# Patient Record
Sex: MALE | Race: WHITE | Employment: OTHER | URBAN - METROPOLITAN AREA
[De-identification: names, ages, dates, MRNs, and addresses within clinical notes are randomized per-mention and may not be internally consistent; named-entity substitution may affect disease eponyms.]

---

## 2021-06-15 ENCOUNTER — HOSPITAL ENCOUNTER (EMERGENCY)
Age: 51
Discharge: HOME | End: 2021-06-16
Attending: EMERGENCY MEDICINE
Payer: MEDICARE

## 2021-06-15 ENCOUNTER — APPOINTMENT (OUTPATIENT)
Dept: CT IMAGING | Age: 51
End: 2021-06-15
Payer: MEDICARE

## 2021-06-15 ENCOUNTER — APPOINTMENT (OUTPATIENT)
Dept: GENERAL RADIOLOGY | Age: 51
End: 2021-06-15
Payer: MEDICARE

## 2021-06-15 DIAGNOSIS — R07.9 CHEST PAIN, UNSPECIFIED TYPE: Primary | ICD-10-CM

## 2021-06-15 LAB
ANION GAP SERPL CALCULATED.3IONS-SCNC: 10 MMOL/L (ref 3–16)
BASOPHILS ABSOLUTE: 0.1 K/UL (ref 0–0.2)
BASOPHILS RELATIVE PERCENT: 1.1 %
BUN BLDV-MCNC: 9 MG/DL (ref 7–20)
CALCIUM SERPL-MCNC: 9 MG/DL (ref 8.3–10.6)
CHLORIDE BLD-SCNC: 102 MMOL/L (ref 99–110)
CO2: 26 MMOL/L (ref 21–32)
CREAT SERPL-MCNC: 0.9 MG/DL (ref 0.9–1.3)
EKG ATRIAL RATE: 80 BPM
EKG DIAGNOSIS: NORMAL
EKG P AXIS: 36 DEGREES
EKG P-R INTERVAL: 140 MS
EKG Q-T INTERVAL: 386 MS
EKG QRS DURATION: 74 MS
EKG QTC CALCULATION (BAZETT): 445 MS
EKG R AXIS: 0 DEGREES
EKG T AXIS: 38 DEGREES
EKG VENTRICULAR RATE: 80 BPM
EOSINOPHILS ABSOLUTE: 0.1 K/UL (ref 0–0.6)
EOSINOPHILS RELATIVE PERCENT: 1.9 %
GFR AFRICAN AMERICAN: >60
GFR NON-AFRICAN AMERICAN: >60
GLUCOSE BLD-MCNC: 104 MG/DL (ref 70–99)
HCT VFR BLD CALC: 40.8 % (ref 40.5–52.5)
HEMOGLOBIN: 14.2 G/DL (ref 13.5–17.5)
LYMPHOCYTES ABSOLUTE: 1.6 K/UL (ref 1–5.1)
LYMPHOCYTES RELATIVE PERCENT: 24.6 %
MCH RBC QN AUTO: 31.1 PG (ref 26–34)
MCHC RBC AUTO-ENTMCNC: 34.8 G/DL (ref 31–36)
MCV RBC AUTO: 89.4 FL (ref 80–100)
MONOCYTES ABSOLUTE: 0.4 K/UL (ref 0–1.3)
MONOCYTES RELATIVE PERCENT: 6.5 %
NEUTROPHILS ABSOLUTE: 4.4 K/UL (ref 1.7–7.7)
NEUTROPHILS RELATIVE PERCENT: 65.9 %
PDW BLD-RTO: 13.5 % (ref 12.4–15.4)
PLATELET # BLD: 195 K/UL (ref 135–450)
PMV BLD AUTO: 7.7 FL (ref 5–10.5)
POTASSIUM REFLEX MAGNESIUM: 3.6 MMOL/L (ref 3.5–5.1)
RBC # BLD: 4.56 M/UL (ref 4.2–5.9)
SODIUM BLD-SCNC: 138 MMOL/L (ref 136–145)
TROPONIN: <0.01 NG/ML
TROPONIN: <0.01 NG/ML
WBC # BLD: 6.6 K/UL (ref 4–11)

## 2021-06-15 PROCEDURE — 96376 TX/PRO/DX INJ SAME DRUG ADON: CPT

## 2021-06-15 PROCEDURE — 71046 X-RAY EXAM CHEST 2 VIEWS: CPT

## 2021-06-15 PROCEDURE — 6360000004 HC RX CONTRAST MEDICATION: Performed by: EMERGENCY MEDICINE

## 2021-06-15 PROCEDURE — 36415 COLL VENOUS BLD VENIPUNCTURE: CPT

## 2021-06-15 PROCEDURE — 93005 ELECTROCARDIOGRAM TRACING: CPT | Performed by: EMERGENCY MEDICINE

## 2021-06-15 PROCEDURE — 84484 ASSAY OF TROPONIN QUANT: CPT

## 2021-06-15 PROCEDURE — 85025 COMPLETE CBC W/AUTO DIFF WBC: CPT

## 2021-06-15 PROCEDURE — 96375 TX/PRO/DX INJ NEW DRUG ADDON: CPT

## 2021-06-15 PROCEDURE — 71260 CT THORAX DX C+: CPT

## 2021-06-15 PROCEDURE — 6360000002 HC RX W HCPCS: Performed by: EMERGENCY MEDICINE

## 2021-06-15 PROCEDURE — 96374 THER/PROPH/DIAG INJ IV PUSH: CPT

## 2021-06-15 PROCEDURE — 6370000000 HC RX 637 (ALT 250 FOR IP): Performed by: EMERGENCY MEDICINE

## 2021-06-15 PROCEDURE — 99285 EMERGENCY DEPT VISIT HI MDM: CPT

## 2021-06-15 PROCEDURE — 80048 BASIC METABOLIC PNL TOTAL CA: CPT

## 2021-06-15 RX ORDER — ATORVASTATIN CALCIUM 20 MG/1
20 TABLET, FILM COATED ORAL DAILY
Status: DISCONTINUED | OUTPATIENT
Start: 2021-06-16 | End: 2021-06-16 | Stop reason: HOSPADM

## 2021-06-15 RX ORDER — DULOXETIN HYDROCHLORIDE 30 MG/1
60 CAPSULE, DELAYED RELEASE ORAL DAILY
Status: DISCONTINUED | OUTPATIENT
Start: 2021-06-16 | End: 2021-06-16 | Stop reason: HOSPADM

## 2021-06-15 RX ORDER — LISINOPRIL 20 MG/1
30 TABLET ORAL DAILY
COMMUNITY
End: 2021-09-08 | Stop reason: SDUPTHER

## 2021-06-15 RX ORDER — ASPIRIN 81 MG/1
81 TABLET, CHEWABLE ORAL DAILY
Status: DISCONTINUED | OUTPATIENT
Start: 2021-06-15 | End: 2021-06-16 | Stop reason: HOSPADM

## 2021-06-15 RX ORDER — ATORVASTATIN CALCIUM 20 MG/1
20 TABLET, FILM COATED ORAL DAILY
Status: ON HOLD | COMMUNITY
End: 2021-09-01 | Stop reason: HOSPADM

## 2021-06-15 RX ORDER — ACETAMINOPHEN 325 MG/1
650 TABLET ORAL EVERY 6 HOURS PRN
Status: DISCONTINUED | OUTPATIENT
Start: 2021-06-15 | End: 2021-06-16 | Stop reason: HOSPADM

## 2021-06-15 RX ORDER — GABAPENTIN 400 MG/1
400 CAPSULE ORAL 3 TIMES DAILY
Status: DISCONTINUED | OUTPATIENT
Start: 2021-06-15 | End: 2021-06-16 | Stop reason: HOSPADM

## 2021-06-15 RX ORDER — PANTOPRAZOLE SODIUM 40 MG/1
40 TABLET, DELAYED RELEASE ORAL
Status: DISCONTINUED | OUTPATIENT
Start: 2021-06-16 | End: 2021-06-16 | Stop reason: HOSPADM

## 2021-06-15 RX ORDER — OMEPRAZOLE 20 MG/1
40 CAPSULE, DELAYED RELEASE ORAL DAILY
COMMUNITY

## 2021-06-15 RX ORDER — GABAPENTIN 400 MG/1
400 CAPSULE ORAL 3 TIMES DAILY
COMMUNITY

## 2021-06-15 RX ORDER — ONDANSETRON 2 MG/ML
4 INJECTION INTRAMUSCULAR; INTRAVENOUS EVERY 30 MIN PRN
Status: DISCONTINUED | OUTPATIENT
Start: 2021-06-15 | End: 2021-06-16 | Stop reason: HOSPADM

## 2021-06-15 RX ORDER — MORPHINE SULFATE 4 MG/ML
4 INJECTION, SOLUTION INTRAMUSCULAR; INTRAVENOUS ONCE
Status: COMPLETED | OUTPATIENT
Start: 2021-06-15 | End: 2021-06-15

## 2021-06-15 RX ORDER — ASPIRIN 81 MG/1
81 TABLET, CHEWABLE ORAL DAILY
Status: ON HOLD | COMMUNITY
End: 2022-04-06 | Stop reason: SDUPTHER

## 2021-06-15 RX ORDER — DULOXETIN HYDROCHLORIDE 60 MG/1
60 CAPSULE, DELAYED RELEASE ORAL NIGHTLY
COMMUNITY

## 2021-06-15 RX ADMIN — ASPIRIN 81 MG: 81 TABLET, CHEWABLE ORAL at 21:56

## 2021-06-15 RX ADMIN — MORPHINE SULFATE 4 MG: 4 INJECTION INTRAVENOUS at 17:54

## 2021-06-15 RX ADMIN — GABAPENTIN 400 MG: 400 CAPSULE ORAL at 21:56

## 2021-06-15 RX ADMIN — IOPAMIDOL 80 ML: 755 INJECTION, SOLUTION INTRAVENOUS at 17:13

## 2021-06-15 ASSESSMENT — ENCOUNTER SYMPTOMS
NAUSEA: 0
DIARRHEA: 0
ABDOMINAL PAIN: 0
SHORTNESS OF BREATH: 0
COUGH: 0
VOMITING: 0

## 2021-06-15 ASSESSMENT — PAIN DESCRIPTION - LOCATION: LOCATION: CHEST

## 2021-06-15 ASSESSMENT — PAIN SCALES - GENERAL
PAINLEVEL_OUTOF10: 5
PAINLEVEL_OUTOF10: 4
PAINLEVEL_OUTOF10: 5

## 2021-06-15 ASSESSMENT — HEART SCORE: ECG: 1

## 2021-06-15 NOTE — ED PROVIDER NOTES
[address] [facility]          ATTENDING PHYSICIAN NOTE       Date of evaluation: 6/15/2021    Chief Complaint     Chest Pain (324 asa/nitro given per squad)      History of Present Illness     Nia Torres is a 49 y.o. male with a history of prior stroke who presents with complaints of sudden onset left sided sharp chest pain radiating through to his left shoulder and back as well as left arm associated with no nausea or vomiting or diaphoresis. The patient has experienced similar symptoms in the past, from what he recalls in 2015 before his last angiogram he had similar symptoms. He denies any productive cough fever or chills. Review of Systems     Review of Systems   Constitutional: Negative for chills, diaphoresis and fever. Respiratory: Negative for cough and shortness of breath. Cardiovascular: Positive for chest pain. Gastrointestinal: Negative for abdominal pain, diarrhea, nausea and vomiting. All other systems reviewed and are negative. Past Medical, Surgical, Family, and Social History     He has a past medical history of Hyperlipidemia, Hypertension, and Stroke ([address] He has no past surgical history on file. His family history is not on file. He reports that he has never smoked. He has never used smokeless tobacco. He reports that he does not drink alcohol and does not use drugs. Medications     Previous Medications    ASPIRIN 81 MG CHEWABLE TABLET    Take 81 mg by mouth daily    ATORVASTATIN (LIPITOR) 20 MG TABLET    Take 20 mg by mouth daily    DULOXETINE (CYMBALTA) 60 MG EXTENDED RELEASE CAPSULE    Take 60 mg by mouth daily    GABAPENTIN (NEURONTIN) 400 MG CAPSULE    Take 400 mg by mouth 3 times daily.     LISINOPRIL (PRINIVIL;ZESTRIL) 20 MG TABLET    Take 30 mg by mouth daily    OMEPRAZOLE (PRILOSEC) 20 MG DELAYED RELEASE CAPSULE    Take 40 mg by mouth daily    VERAPAMIL (CALAN SR) 120 MG EXTENDED RELEASE TABLET    Take 120 mg by mouth nightly orders placed or performed during the hospital encounter of 06/15/21   CBC Auto Differential   Result Value Ref Range    WBC 6.6 4.0 - 11.0 K/uL    RBC 4.56 4.20 - 5.90 M/uL    Hemoglobin 14.2 13.5 - 17.5 g/dL    Hematocrit 40.8 40.5 - 52.5 %    MCV 89.4 80.0 - 100.0 fL    MCH 31.1 26.0 - 34.0 pg    MCHC 34.8 31.0 - 36.0 g/dL    RDW 13.5 12.4 - 15.4 %    Platelets 956 902 - 431 K/uL    MPV 7.7 5.0 - 10.5 fL    Neutrophils % 65.9 %    Lymphocytes % 24.6 %    Monocytes % 6.5 %    Eosinophils % 1.9 %    Basophils % 1.1 %    Neutrophils Absolute 4.4 1.7 - 7.7 K/uL    Lymphocytes Absolute 1.6 1.0 - 5.1 K/uL    Monocytes Absolute 0.4 0.0 - 1.3 K/uL    Eosinophils Absolute 0.1 0.0 - 0.6 K/uL    Basophils Absolute 0.1 0.0 - 0.2 K/uL   Basic Metabolic Panel w/ Reflex to MG   Result Value Ref Range    Sodium 138 136 - 145 mmol/L    Potassium reflex Magnesium 3.6 3.5 - 5.1 mmol/L    Chloride 102 99 - 110 mmol/L    CO2 26 21 - 32 mmol/L    Anion Gap 10 3 - 16    Glucose 104 (H) 70 - 99 mg/dL    BUN 9 7 - 20 mg/dL    CREATININE 0.9 0.9 - 1.3 mg/dL    GFR Non-African American >60 >60    GFR African American >60 >60    Calcium 9.0 8.3 - 10.6 mg/dL   Troponin   Result Value Ref Range    Troponin <0.01 <0.01 ng/mL   EKG 12 Lead   Result Value Ref Range    Ventricular Rate 80 BPM    Atrial Rate 80 BPM    P-R Interval 140 ms    QRS Duration 74 ms    Q-T Interval 386 ms    QTc Calculation (Bazett) 445 ms    P Axis 36 degrees    R Axis 0 degrees    T Axis 38 degrees    Diagnosis       EKG performed in ER and to be interpreted by ER physician. Confirmed by MD, ER ([id number]),  Tracy Mcgill ([phone]) on 6/15/2021 4:02:39 PM       RECENT VITALS:  BP: (!) 137/93,Temp: 97.8 °F (36.6 °C), Pulse: 85, Resp: 16, SpO2: 94 %       ED Course     Nursing Notes, Past Medical Hx, Past Surgical Hx, Social Hx,Allergies, and Family Hx were reviewed.     patient was given the following medications:  Orders Placed This Encounter   Medications    iopamidol (ISOVUE-370) 76 % injection 80 mL    morphine injection 4 mg       CONSULTS:  None    MEDICAL DECISIONMAKING / ASSESSMENT / Ricardo Dory is a 49 y.o. male presenting with left-sided chest pain that is sharp going through to the shoulder and into the left arm. The patient has a history of stroke with residual left-sided weakness and does have a history of hypertension and hypercholesterolemia. Given these risk factors, saw that he would need further risk stratification. Initial troponin is negative and EKG is not showing any signs of acute ischemia. CT of the chest was performed to look for aortic dissection given the location of the pain and this was unremarkable. I felt the patient would need provocative testing and he will be placed in ED observation to have a nuclear stress test in the morning. Clinical Impression     1.  Chest pain, unspecified type        Disposition     DISPOSITION         Jah Conway MD  06/15/21 0011

## 2021-06-15 NOTE — ED NOTES
Bed: A10-10  Expected date:   Expected time:   Means of arrival:   Comments:  Hold for room Darshan 106, RN  06/15/21 [place]

## 2021-06-15 NOTE — ED NOTES
[facility] Emergency Department  EDObservation Admission Note   Emergency Physicians       Time Placed in ED Observation: DISPOSITION Ed Observation 06/15/2021 05:57:35 PM    Impression and Plan      In summary, Chuck Crandall is admitted by to the [facility] for chest pain. Dr. Zunilda Guzman is the CDU admission attending. This patient has been risk-stratified based on the available history, physical exam, and related study findings. Admission toobservation status for further diagnosis/treatment/monitoring of chest pain is warranted clinically. This extendedperiod of observation is specifically required to determine the need for hospitalization. We will observe the patient for the following endpoints:    - serial troponins  - chemical nuclear stress test    When met, appropriate disposition will be arranged. Diagnostic Evaluation      Diagnostic studies and ED interventions germane to this period of clinical observation willinclude:    - EKG, chest xray, trop x2 negative       Consultant(s)      None       Patient History      Chuck Crandall is a 49 y.o. male who presented to the Emergency Department for evaluation of chest pain. The acute evaluation included EKG, chest x-ray, and 2 troponins that were negative. Also had a CT of the chest to look for aortic abnormality and none was seen under    Upon admission to the Clinical Decision Unit, Chuck Crandall had received morphine for his chest pain was chest pain-free. The pain was atypical and that it was sharp pain radiating to his left shoulder with some pain in the left arm but nonexertional.  Heart score is 3. Past Medical History  Past Medical History:   Diagnosis Date    Hyperlipidemia     Hypertension     Stroke ([address]     2012     No past surgical history on file. No family history on file.   Social History     Tobacco Use    Smoking status: Never Smoker    Smokeless tobacco: Never Used   Substance

## 2021-06-16 VITALS
OXYGEN SATURATION: 100 % | HEART RATE: 81 BPM | DIASTOLIC BLOOD PRESSURE: 100 MMHG | HEIGHT: 68 IN | TEMPERATURE: 97.9 F | RESPIRATION RATE: 15 BRPM | BODY MASS INDEX: 30.31 KG/M2 | SYSTOLIC BLOOD PRESSURE: 149 MMHG | WEIGHT: 200 LBS

## 2021-06-16 LAB
EKG ATRIAL RATE: 75 BPM
EKG DIAGNOSIS: NORMAL
EKG P AXIS: 64 DEGREES
EKG P-R INTERVAL: 152 MS
EKG Q-T INTERVAL: 394 MS
EKG QRS DURATION: 88 MS
EKG QTC CALCULATION (BAZETT): 439 MS
EKG R AXIS: 41 DEGREES
EKG T AXIS: 57 DEGREES
EKG VENTRICULAR RATE: 75 BPM
LV EF: 72 %
LVEF MODALITY: NORMAL
TROPONIN: <0.01 NG/ML

## 2021-06-16 PROCEDURE — A9502 TC99M TETROFOSMIN: HCPCS | Performed by: EMERGENCY MEDICINE

## 2021-06-16 PROCEDURE — 2580000003 HC RX 258: Performed by: EMERGENCY MEDICINE

## 2021-06-16 PROCEDURE — 3430000000 HC RX DIAGNOSTIC RADIOPHARMACEUTICAL: Performed by: EMERGENCY MEDICINE

## 2021-06-16 PROCEDURE — 78452 HT MUSCLE IMAGE SPECT MULT: CPT

## 2021-06-16 PROCEDURE — 6360000002 HC RX W HCPCS: Performed by: EMERGENCY MEDICINE

## 2021-06-16 PROCEDURE — 93017 CV STRESS TEST TRACING ONLY: CPT

## 2021-06-16 RX ORDER — SODIUM CHLORIDE 0.9 % (FLUSH) 0.9 %
10 SYRINGE (ML) INJECTION PRN
Status: DISCONTINUED | OUTPATIENT
Start: 2021-06-16 | End: 2021-06-16 | Stop reason: HOSPADM

## 2021-06-16 RX ADMIN — TETROFOSMIN 30 MILLICURIE: 1.38 INJECTION, POWDER, LYOPHILIZED, FOR SOLUTION INTRAVENOUS at 12:42

## 2021-06-16 RX ADMIN — REGADENOSON 0.4 MG: 0.08 INJECTION, SOLUTION INTRAVENOUS at 12:42

## 2021-06-16 RX ADMIN — Medication 10 ML: at 11:28

## 2021-06-16 RX ADMIN — Medication 10 ML: at 12:42

## 2021-06-16 RX ADMIN — TETROFOSMIN 10 MILLICURIE: 1.38 INJECTION, POWDER, LYOPHILIZED, FOR SOLUTION INTRAVENOUS at 11:28

## 2021-06-16 NOTE — ED PROVIDER NOTES
Nose: Nose normal.      Mouth/Throat:      Mouth: Mucous membranes are moist.   Eyes:      Extraocular Movements: Extraocular movements intact. Conjunctiva/sclera: Conjunctivae normal.      Pupils: Pupils are equal, round, and reactive to light. Cardiovascular:      Rate and Rhythm: Normal rate and regular rhythm. Pulses: Normal pulses. Pulmonary:      Effort: Pulmonary effort is normal.      Breath sounds: Normal breath sounds. Abdominal:      General: Abdomen is flat. Skin:     General: Skin is warm and dry. Capillary Refill: Capillary refill takes less than 2 seconds. Neurological:      General: No focal deficit present. Mental Status: He is alert. Mental status is at baseline.    Psychiatric:         Mood and Affect: Mood normal.         Behavior: Behavior normal.             Marcial Trejo MD  06/16/21 [id number]

## 2021-08-30 ENCOUNTER — HOSPITAL ENCOUNTER (OUTPATIENT)
Age: 51
Setting detail: OBSERVATION
Discharge: HOME | End: 2021-09-01
Attending: EMERGENCY MEDICINE | Admitting: INTERNAL MEDICINE
Payer: MEDICARE

## 2021-08-30 ENCOUNTER — APPOINTMENT (OUTPATIENT)
Dept: GENERAL RADIOLOGY | Age: 51
End: 2021-08-30
Payer: MEDICARE

## 2021-08-30 DIAGNOSIS — R07.9 CHEST PAIN, UNSPECIFIED TYPE: Primary | ICD-10-CM

## 2021-08-30 LAB
ALBUMIN SERPL-MCNC: 4.2 G/DL (ref 3.4–5)
ALP BLD-CCNC: 90 U/L (ref 40–129)
ALT SERPL-CCNC: 15 U/L (ref 10–40)
ANION GAP SERPL CALCULATED.3IONS-SCNC: 14 MMOL/L (ref 3–16)
AST SERPL-CCNC: 12 U/L (ref 15–37)
BASOPHILS ABSOLUTE: 0 K/UL (ref 0–0.2)
BASOPHILS RELATIVE PERCENT: 0.6 %
BILIRUB SERPL-MCNC: 0.4 MG/DL (ref 0–1)
BILIRUBIN DIRECT: <0.2 MG/DL (ref 0–0.3)
BILIRUBIN, INDIRECT: ABNORMAL MG/DL (ref 0–1)
BUN BLDV-MCNC: 11 MG/DL (ref 7–20)
CALCIUM SERPL-MCNC: 9.2 MG/DL (ref 8.3–10.6)
CHLORIDE BLD-SCNC: 101 MMOL/L (ref 99–110)
CO2: 22 MMOL/L (ref 21–32)
CREAT SERPL-MCNC: 1 MG/DL (ref 0.9–1.3)
EKG ATRIAL RATE: 83 BPM
EKG DIAGNOSIS: NORMAL
EKG P AXIS: 37 DEGREES
EKG P-R INTERVAL: 132 MS
EKG Q-T INTERVAL: 518 MS
EKG QRS DURATION: 80 MS
EKG QTC CALCULATION (BAZETT): 608 MS
EKG R AXIS: 4 DEGREES
EKG T AXIS: 42 DEGREES
EKG VENTRICULAR RATE: 83 BPM
EOSINOPHILS ABSOLUTE: 0.1 K/UL (ref 0–0.6)
EOSINOPHILS RELATIVE PERCENT: 1.5 %
GFR AFRICAN AMERICAN: >60
GFR NON-AFRICAN AMERICAN: >60
GLUCOSE BLD-MCNC: 133 MG/DL (ref 70–99)
HCT VFR BLD CALC: 42.1 % (ref 40.5–52.5)
HEMOGLOBIN: 14.4 G/DL (ref 13.5–17.5)
LYMPHOCYTES ABSOLUTE: 1.3 K/UL (ref 1–5.1)
LYMPHOCYTES RELATIVE PERCENT: 23.1 %
MCH RBC QN AUTO: 30.8 PG (ref 26–34)
MCHC RBC AUTO-ENTMCNC: 34.3 G/DL (ref 31–36)
MCV RBC AUTO: 89.9 FL (ref 80–100)
MONOCYTES ABSOLUTE: 0.3 K/UL (ref 0–1.3)
MONOCYTES RELATIVE PERCENT: 6 %
NEUTROPHILS ABSOLUTE: 3.9 K/UL (ref 1.7–7.7)
NEUTROPHILS RELATIVE PERCENT: 68.8 %
PDW BLD-RTO: 13.9 % (ref 12.4–15.4)
PLATELET # BLD: 176 K/UL (ref 135–450)
PMV BLD AUTO: 8.1 FL (ref 5–10.5)
POTASSIUM REFLEX MAGNESIUM: 3.6 MMOL/L (ref 3.5–5.1)
PRO-BNP: 7 PG/ML (ref 0–124)
RBC # BLD: 4.68 M/UL (ref 4.2–5.9)
SODIUM BLD-SCNC: 137 MMOL/L (ref 136–145)
TOTAL PROTEIN: 7.1 G/DL (ref 6.4–8.2)
TROPONIN: <0.01 NG/ML
TROPONIN: <0.01 NG/ML
WBC # BLD: 5.7 K/UL (ref 4–11)

## 2021-08-30 PROCEDURE — G0378 HOSPITAL OBSERVATION PER HR: HCPCS

## 2021-08-30 PROCEDURE — 2060000000 HC ICU INTERMEDIATE R&B

## 2021-08-30 PROCEDURE — 80076 HEPATIC FUNCTION PANEL: CPT

## 2021-08-30 PROCEDURE — 93005 ELECTROCARDIOGRAM TRACING: CPT | Performed by: INTERNAL MEDICINE

## 2021-08-30 PROCEDURE — 96372 THER/PROPH/DIAG INJ SC/IM: CPT

## 2021-08-30 PROCEDURE — 6360000002 HC RX W HCPCS: Performed by: INTERNAL MEDICINE

## 2021-08-30 PROCEDURE — 71046 X-RAY EXAM CHEST 2 VIEWS: CPT

## 2021-08-30 PROCEDURE — 99205 OFFICE O/P NEW HI 60 MIN: CPT | Performed by: INTERNAL MEDICINE

## 2021-08-30 PROCEDURE — 83880 ASSAY OF NATRIURETIC PEPTIDE: CPT

## 2021-08-30 PROCEDURE — 85025 COMPLETE CBC W/AUTO DIFF WBC: CPT

## 2021-08-30 PROCEDURE — 84484 ASSAY OF TROPONIN QUANT: CPT

## 2021-08-30 PROCEDURE — 2580000003 HC RX 258: Performed by: INTERNAL MEDICINE

## 2021-08-30 PROCEDURE — 36415 COLL VENOUS BLD VENIPUNCTURE: CPT

## 2021-08-30 PROCEDURE — 99284 EMERGENCY DEPT VISIT MOD MDM: CPT

## 2021-08-30 PROCEDURE — 80048 BASIC METABOLIC PNL TOTAL CA: CPT

## 2021-08-30 PROCEDURE — 6370000000 HC RX 637 (ALT 250 FOR IP): Performed by: INTERNAL MEDICINE

## 2021-08-30 RX ORDER — SODIUM CHLORIDE 0.9 % (FLUSH) 0.9 %
10 SYRINGE (ML) INJECTION PRN
Status: DISCONTINUED | OUTPATIENT
Start: 2021-08-30 | End: 2021-08-31

## 2021-08-30 RX ORDER — HYDRALAZINE HYDROCHLORIDE 20 MG/ML
10 INJECTION INTRAMUSCULAR; INTRAVENOUS EVERY 6 HOURS PRN
Status: DISCONTINUED | OUTPATIENT
Start: 2021-08-30 | End: 2021-09-01 | Stop reason: HOSPADM

## 2021-08-30 RX ORDER — SODIUM CHLORIDE 0.9 % (FLUSH) 0.9 %
10 SYRINGE (ML) INJECTION EVERY 12 HOURS SCHEDULED
Status: DISCONTINUED | OUTPATIENT
Start: 2021-08-30 | End: 2021-08-31

## 2021-08-30 RX ORDER — ONDANSETRON 2 MG/ML
4 INJECTION INTRAMUSCULAR; INTRAVENOUS EVERY 6 HOURS PRN
Status: DISCONTINUED | OUTPATIENT
Start: 2021-08-30 | End: 2021-09-01 | Stop reason: HOSPADM

## 2021-08-30 RX ORDER — ASPIRIN 81 MG/1
324 TABLET, CHEWABLE ORAL ONCE
Status: DISCONTINUED | OUTPATIENT
Start: 2021-08-30 | End: 2021-09-01 | Stop reason: HOSPADM

## 2021-08-30 RX ORDER — ACETAMINOPHEN 650 MG/1
650 SUPPOSITORY RECTAL EVERY 6 HOURS PRN
Status: DISCONTINUED | OUTPATIENT
Start: 2021-08-30 | End: 2021-09-01 | Stop reason: HOSPADM

## 2021-08-30 RX ORDER — SENNA AND DOCUSATE SODIUM 50; 8.6 MG/1; MG/1
1 TABLET, FILM COATED ORAL 2 TIMES DAILY
Status: DISCONTINUED | OUTPATIENT
Start: 2021-08-30 | End: 2021-09-01 | Stop reason: HOSPADM

## 2021-08-30 RX ORDER — ACETAMINOPHEN 325 MG/1
650 TABLET ORAL EVERY 6 HOURS PRN
Status: DISCONTINUED | OUTPATIENT
Start: 2021-08-30 | End: 2021-09-01 | Stop reason: HOSPADM

## 2021-08-30 RX ORDER — NITROGLYCERIN 0.4 MG/1
0.4 TABLET SUBLINGUAL ONCE
Status: DISCONTINUED | OUTPATIENT
Start: 2021-08-30 | End: 2021-09-01 | Stop reason: HOSPADM

## 2021-08-30 RX ORDER — FAMOTIDINE 20 MG/1
20 TABLET, FILM COATED ORAL 2 TIMES DAILY
Status: DISCONTINUED | OUTPATIENT
Start: 2021-08-30 | End: 2021-09-01 | Stop reason: HOSPADM

## 2021-08-30 RX ORDER — SODIUM CHLORIDE 9 MG/ML
25 INJECTION, SOLUTION INTRAVENOUS PRN
Status: DISCONTINUED | OUTPATIENT
Start: 2021-08-30 | End: 2021-09-01 | Stop reason: HOSPADM

## 2021-08-30 RX ORDER — ASPIRIN 81 MG/1
81 TABLET ORAL DAILY
Status: DISCONTINUED | OUTPATIENT
Start: 2021-08-31 | End: 2021-09-01 | Stop reason: HOSPADM

## 2021-08-30 RX ORDER — NITROGLYCERIN 0.4 MG/1
0.4 TABLET SUBLINGUAL EVERY 5 MIN PRN
Status: DISCONTINUED | OUTPATIENT
Start: 2021-08-30 | End: 2021-09-01 | Stop reason: HOSPADM

## 2021-08-30 RX ORDER — GABAPENTIN 100 MG/1
400 CAPSULE ORAL 3 TIMES DAILY
Status: DISCONTINUED | OUTPATIENT
Start: 2021-08-30 | End: 2021-09-01 | Stop reason: HOSPADM

## 2021-08-30 RX ORDER — ATORVASTATIN CALCIUM 40 MG/1
80 TABLET, FILM COATED ORAL NIGHTLY
Status: DISCONTINUED | OUTPATIENT
Start: 2021-08-30 | End: 2021-09-01 | Stop reason: HOSPADM

## 2021-08-30 RX ORDER — DULOXETIN HYDROCHLORIDE 30 MG/1
60 CAPSULE, DELAYED RELEASE ORAL DAILY
Status: DISCONTINUED | OUTPATIENT
Start: 2021-08-31 | End: 2021-09-01 | Stop reason: HOSPADM

## 2021-08-30 RX ORDER — ONDANSETRON 4 MG/1
4 TABLET, ORALLY DISINTEGRATING ORAL EVERY 8 HOURS PRN
Status: DISCONTINUED | OUTPATIENT
Start: 2021-08-30 | End: 2021-09-01 | Stop reason: HOSPADM

## 2021-08-30 RX ADMIN — ATORVASTATIN CALCIUM 80 MG: 40 TABLET, FILM COATED ORAL at 21:16

## 2021-08-30 RX ADMIN — GABAPENTIN 400 MG: 100 CAPSULE ORAL at 21:16

## 2021-08-30 RX ADMIN — NITROGLYCERIN 0.4 MG: 0.4 TABLET, ORALLY DISINTEGRATING SUBLINGUAL at 21:16

## 2021-08-30 RX ADMIN — ENOXAPARIN SODIUM 40 MG: 40 INJECTION SUBCUTANEOUS at 23:47

## 2021-08-30 RX ADMIN — Medication 10 ML: at 21:23

## 2021-08-30 RX ADMIN — LISINOPRIL 30 MG: 20 TABLET ORAL at 21:16

## 2021-08-30 RX ADMIN — METOPROLOL TARTRATE 25 MG: 25 TABLET, FILM COATED ORAL at 21:16

## 2021-08-30 ASSESSMENT — PAIN DESCRIPTION - PAIN TYPE
TYPE: ACUTE PAIN

## 2021-08-30 ASSESSMENT — PAIN DESCRIPTION - DESCRIPTORS
DESCRIPTORS: TIGHTNESS
DESCRIPTORS: TIGHTNESS
DESCRIPTORS: SHARP;TIGHTNESS

## 2021-08-30 ASSESSMENT — PAIN DESCRIPTION - FREQUENCY
FREQUENCY: CONTINUOUS

## 2021-08-30 ASSESSMENT — PAIN SCALES - GENERAL
PAINLEVEL_OUTOF10: 7
PAINLEVEL_OUTOF10: 1
PAINLEVEL_OUTOF10: 5

## 2021-08-30 ASSESSMENT — ENCOUNTER SYMPTOMS
COUGH: 0
CHEST TIGHTNESS: 1
VOMITING: 0
WHEEZING: 0
SHORTNESS OF BREATH: 1
NAUSEA: 0
ABDOMINAL PAIN: 0
DIARRHEA: 0

## 2021-08-30 ASSESSMENT — PAIN - FUNCTIONAL ASSESSMENT: PAIN_FUNCTIONAL_ASSESSMENT: ACTIVITIES ARE NOT PREVENTED

## 2021-08-30 ASSESSMENT — PAIN DESCRIPTION - LOCATION
LOCATION: CHEST

## 2021-08-30 ASSESSMENT — PAIN DESCRIPTION - PROGRESSION: CLINICAL_PROGRESSION: GRADUALLY IMPROVING

## 2021-08-30 ASSESSMENT — PAIN DESCRIPTION - ORIENTATION: ORIENTATION: LEFT

## 2021-08-30 ASSESSMENT — PAIN DESCRIPTION - ONSET: ONSET: UNABLE TO TELL

## 2021-08-30 ASSESSMENT — PAIN DESCRIPTION - DIRECTION: RADIATING_TOWARDS: ARM

## 2021-08-30 NOTE — PROGRESS NOTES
md paged r/t pt complaints of chest pain that is a 5/10. States that his chest feels tight and has an occasional stabbing pain. Pt was given one nitro in the er and per report pt was pain free. 1912 awaiting return call from md.  1916 no return call at this time. Report given to the oncoming nurse.

## 2021-08-30 NOTE — CONSULTS
49 y.o. here for cp. Pt has sharp pain in the mid chest, stabbing. Also has periods of tightness. He has some remaining even now though it's better. Pain started with walking earlier today. He has had episodes previously, and in fact he came to the ER in June. He had a neg stress test at the time. He has had some sob, and some yoo. No n/v/syncope. No orthopnea or PND. No LE edema. No rashes. No blood in urine or BMs. Has had a stroke but clot was \"gone\" when they looked later. Past Medical History:   Diagnosis Date    Hyperlipidemia     Hypertension     Stroke [facility])     2012     History reviewed. No pertinent surgical history. Social History     Tobacco Use    Smoking status: Never Smoker    Smokeless tobacco: Never Used   Substance Use Topics    Alcohol use: Never    Drug use: Never     Allergies   Allergen Reactions    Codeine      History reviewed. No pertinent family history. Review of Systems   General: No fevers, chills, fatigue, or night sweats. No abnormal changes in weight. HEENT: No blurry or decreased vision. No changes in hearing, nasal discharge or sore throat. Cardiovascular: See HPI. No cramping in legs or buttocks when walking. Respiratory: No cough, hemoptysis, or wheezing. No history of asthma. Gastrointestinal: No abdominal pain, hematochezia, melana, or history of GI ulcers. Genito-Urinary: No dysuria or hematuria. No urgency or polyuria. Musculoskeletal: No complaints of joint pain, joint swelling or muscular weakness/soreness. Neurological: No dizziness or headaches. No numbness/tingling, speech problems or weakness. No history of a stroke or TIA. Psychological: No anxiety or depression  Hematological and Lymphatic: No abnormal bleeding or bruising, blood clots, jaundice. Endocrine: No malaise/lethargy, palpitations, polydipsia/polyuria, temperature intolerance or unexpected weight changes. Skin: No rashes or non-healing ulcers.     PE:  Blood pressure 136/84, pulse 84, temperature 98.4 °F (36.9 °C), temperature source Oral, resp. rate 18, SpO2 96 %. General (appearance): Well devel. No distress  Eyes: Anicteric. EOMI  Neck: supple. No JVD  Ears/Nose/Mouth/Thorat: No cyanosis  CV: RRR. No m/r/g   Respiratory:  Clar B, normal respiratory effort  GI: abd s/nt/nd  Skin: Warm, dry. No rashes  Neuro/Psych: Alert and oriented x 3. Appropriate behavior  Ext:  No c/c. No pitting edema  Pulses:  2+ radial B    Lab Results   Component Value Date    WBC 5.7 08/30/2021    HGB 14.4 08/30/2021    HCT 42.1 08/30/2021    MCV 89.9 08/30/2021     08/30/2021     Lab Results   Component Value Date     08/30/2021    K 3.6 08/30/2021     08/30/2021    CO2 22 08/30/2021    BUN 11 08/30/2021    CREATININE 1.0 08/30/2021    GLUCOSE 133 (H) 08/30/2021    CALCIUM 9.2 08/30/2021    LABGLOM >60 08/30/2021    GFRAA >60 08/30/2021     No results found for: INR, PROTIME    Lab Results   Component Value Date    TROPONINI <0.01 08/30/2021     No results found for: ALT, AST, GGT, ALKPHOS, BILITOT    No results found for: CHOL  No results found for: TRIG  No results found for: HDL  No results found for: LDLCHOLESTEROL, LDLCALC  No results found for: LABVLDL, VLDL  No results found for: CHOLHDLRATIO    ECG: NSR with inferior Q waves    6/2021 Nuc Stress: No ischemia. EF normal    6/2021 PE CT: No PE. A/P:  49 y.o. admitted with chest pain. Issues:  -Chest pain/Unstable angina  -H/O Stroke  -HTN  -Hyperlipidemia    Recs:  -ASA  -Atorvastatin 80 mg daily (not 20)  -Would start Metoprolol 25 bid and hold verapamil  -Lisinopril  -Hydralazine 10 IV q hour prn for sbp over 160.  -R/B/A to cath discussed. No need to repeat stress since he had one recently. I discussed CTA. Pt opted for cath, and given his RFs of HTN, HL, previous stroke and his contd symptoms, I think this is appropriate.   -Serial Tn; start heparin gtt if Tn elevated    NPO p MN ex meds; cath tomorrow.

## 2021-08-30 NOTE — PROGRESS NOTES
Report received from the er. [phone] er nurse brought pt by bed and placed into room 9737. Pt assisted to the bathroom x1 with cane. Pt voided and assisted to bed and placed into gown. md was notified about c/o chest tightness and occasional stabbing pain. Meal ordered for pt. Pt oriented to room.

## 2021-08-30 NOTE — ED PROVIDER NOTES
[address] [facility] ENCOUNTER          PHYSICIAN ASSISTANT NOTE       Date of evaluation: 8/30/2021    Chief Complaint     Chest Pain      History of Present Illness     Basilio Dominguez is a 49 y.o. male with history of hypertension, hyperlipidemia, stroke who is presenting to the emergency department today with chief complaint of chest pain. Patient reports that he went to the grocery store and felt completely normal.  Upon arriving home while carrying groceries into the house, he began developing chest tightness in the left chest as well as sharp stabbing pain. The pain began radiating down the left arm and he felt diaphoretic as well. Felt mildly short of breath as well. Patient called EMS for evaluation. Similar symptoms occurred approximately 2 months ago. Was seen in this emergency department had a normal stress test at that time. Review of Systems     Review of Systems   Constitutional: Negative for chills, diaphoresis, fatigue and fever. Respiratory: Positive for chest tightness and shortness of breath. Negative for cough and wheezing. Cardiovascular: Positive for chest pain. Negative for palpitations and leg swelling. Gastrointestinal: Negative for abdominal pain, diarrhea, nausea and vomiting. Musculoskeletal: Negative. Skin: Negative for rash and wound. Neurological: Negative for dizziness, weakness, light-headedness and headaches. Past Medical, Surgical, Family, and Social History     He has a past medical history of Hyperlipidemia, Hypertension, and Stroke ([address] He has no past surgical history on file. His family history is not on file. He reports that he has never smoked. He has never used smokeless tobacco. He reports that he does not drink alcohol and does not use drugs.     Medications     Previous Medications    ASPIRIN 81 MG CHEWABLE TABLET    Take 81 mg by mouth daily    ATORVASTATIN (LIPITOR) 20 MG TABLET    Take 20 mg by mouth daily DULOXETINE (CYMBALTA) 60 MG EXTENDED RELEASE CAPSULE    Take 60 mg by mouth daily    GABAPENTIN (NEURONTIN) 400 MG CAPSULE    Take 400 mg by mouth 3 times daily. LISINOPRIL (PRINIVIL;ZESTRIL) 20 MG TABLET    Take 30 mg by mouth daily    OMEPRAZOLE (PRILOSEC) 20 MG DELAYED RELEASE CAPSULE    Take 40 mg by mouth daily    VERAPAMIL (CALAN SR) 120 MG EXTENDED RELEASE TABLET    Take 120 mg by mouth nightly       Allergies     He is allergic to codeine. Physical Exam     INITIAL VITALS: BP: 136/84, Temp: 98.4 °F (36.9 °C), Pulse: 84, Resp: 18, SpO2: 96 %  Physical Exam  Vitals and nursing note reviewed. Constitutional:       General: He is not in acute distress. Appearance: He is well-developed. He is not diaphoretic. HENT:      Head: Normocephalic and atraumatic. Eyes:      Conjunctiva/sclera: Conjunctivae normal.      Pupils: Pupils are equal, round, and reactive to light. Cardiovascular:      Rate and Rhythm: Normal rate and regular rhythm. Heart sounds: Normal heart sounds. No murmur heard. No friction rub. Pulmonary:      Effort: Pulmonary effort is normal. No respiratory distress. Breath sounds: Normal breath sounds. No wheezing or rales. Abdominal:      Palpations: Abdomen is soft. Tenderness: There is no abdominal tenderness. There is no guarding. Musculoskeletal:         General: No swelling. Normal range of motion. Cervical back: Normal range of motion. Right lower leg: No edema. Left lower leg: No edema. Skin:     General: Skin is warm and dry. Neurological:      Mental Status: He is alert and oriented to person, place, and time. Psychiatric:         Behavior: Behavior normal.         Thought Content:  Thought content normal.         Judgment: Judgment normal.         Diagnostic Results     EKG   Interpreted in conjunction with emergency department physician Saleem Parekh MD  Rhythm: normal sinus   Rate: 83  Axis: normal  : none  Conduction: SL tablet 0.4 mg       CONSULTS:  IP CONSULT TO CARDIOLOGY  IP CONSULT TO HOSPITALIST    MEDICAL DECISION MAKING / ASSESSMENT / Mikel Jiménez is a 49 y.o. male presenting with chest pain during exertion. Patient arrived with chest pain after exertion walking groceries into his home. On arrival, hemodynamically stable. Complaining of pain to the left anterior chest radiating into left arm, mildly diaphoretic. EKG was obtained which is reassuring without acute evidence of ischemia. Significantly flattened T waves throughout, consistent with prior. Examination, cardiac sounds unremarkable, no concerning lung sounds. Abdomen is benign. No peripheral edema or unilateral leg swelling present. IV access established basic labs are initiated. CBC and renal panel are reassuring. BNP of 7, initial troponin is negative. Repeat is pending. Due to patient's concerning story, cardiology was consulted. Based on the story and repeat presentation with concerning symptoms, plan will be to take patient to cardiology Cath Lab for further diagnostic evaluation. Patient will be admitted to hospitalist for further care. This patient was also evaluated by the attending physician. All care plans were discussed and agreed upon. Clinical Impression     1. Chest pain, unspecified type        Disposition     PATIENT REFERRED TO:  No follow-up provider specified.     DISCHARGE MEDICATIONS:  New Prescriptions    No medications on file       DISPOSITION Decision To Admit 08/30/2021 04:33:07 PM        Brandon Buck  08/30/21 [id number]

## 2021-08-30 NOTE — H&P
Hospital Medicine History & Physical      PCP: JULIA Shah CNP    Date of Admission: 8/30/2021    Date of Service: 8/30/2021    Pt seen/examined on 8/30/2021    Admitted to Inpatient with expected LOS greater than two midnights due to medical therapy. Chief Complaint:     Chief Complaint   Patient presents with    Chest Pain       History Of Present Illness:      49 y.o. male who presented to [facility]. with chest pain that began while carrying in groceries this afternoon, lasted for about 20 minutes and resolved with rest. The sx were associated with diaphoresis. He has a h/o HTN, HLD, CVA. He had a negative stress test 2 months ago. Past Medical History:      Reviewed and non-contributory except as noted below      Diagnosis Date    Hyperlipidemia     Hypertension     Stroke ([facility] [address]     2012       Past Surgical History:      Reviewed and non-contributory except as noted below  History reviewed. No pertinent surgical history. Medications Prior to Admission:      Reviewed and non-contributory except as noted below  Prior to Admission medications    Medication Sig Start Date End Date Taking? Authorizing Provider   atorvastatin (LIPITOR) 20 MG tablet Take 20 mg by mouth daily    Historical Provider, MD   DULoxetine (CYMBALTA) 60 MG extended release capsule Take 60 mg by mouth daily    Historical Provider, MD   gabapentin (NEURONTIN) 400 MG capsule Take 400 mg by mouth 3 times daily.     Historical Provider, MD   lisinopril (PRINIVIL;ZESTRIL) 20 MG tablet Take 30 mg by mouth daily    Historical Provider, MD   omeprazole (PRILOSEC) 20 MG delayed release capsule Take 40 mg by mouth daily    Historical Provider, MD   verapamil (CALAN SR) 120 MG extended release tablet Take 120 mg by mouth nightly    Historical Provider, MD   aspirin 81 MG chewable tablet Take 81 mg by mouth daily    Historical Provider, MD       Allergies:     Reviewed and non-contributory except as noted below Paco    Social History:      Reviewed and non-contributory except as noted below    TOBACCO:   reports that he has never smoked. He has never used smokeless tobacco.  ETOH:   reports no history of alcohol use. Family History:      Reviewed and non-contributory except as noted below    History reviewed. No pertinent family history. REVIEW OF SYSTEMS:   Pertinent positives and negatives as noted in the HPI. All other systems reviewed and negative. PHYSICAL EXAM PERFORMED:    /84   Pulse 84   Temp 98.4 °F (36.9 °C) (Oral)   Resp 18   SpO2 96%     General appearance:  No acute distress, appears stated age  Eyes: Pupils equal, round, reactive to light, conjunctiva/corneas clear  Ears/Nose/Mouth/Throat: No external lesions or scars, hearing intact to voice  Neck: Trachea midline, no masses noted, no thyromegaly  Respiratory:  Non-labored breathing, clear to auscultation bilaterally  Cardiovascular: Regular rate and rhythm, no murmurs, gallops, or rubs  Abdomen: soft, non-tender, non-distended  Musculoskeletal: Warm, well perfused, no cyanosis or edema  Skin: normal color, no wounds noted  Psychiatric: A&Ox4, good insight and judgment    Labs:     Recent Labs     08/30/21  1528   WBC 5.7   HGB 14.4   HCT 42.1        Recent Labs     08/30/21  1528      K 3.6      CO2 22   BUN 11   CREATININE 1.0   CALCIUM 9.2     No results for input(s): AST, ALT, BILIDIR, BILITOT, ALKPHOS in the last 72 hours. No results for input(s): INR in the last 72 hours. Recent Labs     08/30/21  1528   TROPONINI <0.01       Urinalysis:    No results found for: Elnita Danker, BACTERIA, RBCUA, BLOODU, SPECGRAV, GLUCOSEU    Radiology:     XR CHEST (2 VW)   Final Result   1. No acute disease.           ASSESSMENT:    Active Hospital Problems    Diagnosis Date Noted    Chest pain [R07.9] 08/30/2021       PLAN:    # chest pain  -concern for ACS  -tele  -asa, statin  -trend troponin, if elevated will start heparin gtt  -cardiology consulted  -metoprolol 25 BID, hold verapamil  -lisinopril  -hydralazine PRN  -NPO at midnight  -likely for cath in AM  -Cr WNL, troponin ordered, chest XR personally reviewed and negative    # HTN  # HLD  # h/o CVA  -management as above    DVT Prophylaxis: Lovenox  Diet: No diet orders on file  Code Status: Prior    PT/OT Eval Status: Deferred    Dispo: Reyes Dow pending clinical improvement    Jah Marlow MD    Thank you JULIA Landeros - YONY for the opportunity to be involved in this patient's care. If you have any questions or concerns please feel free to contact me at [phone].

## 2021-08-31 ENCOUNTER — APPOINTMENT (OUTPATIENT)
Dept: CARDIAC CATH/INVASIVE PROCEDURES | Age: 51
End: 2021-08-31
Payer: MEDICARE

## 2021-08-31 LAB
ANION GAP SERPL CALCULATED.3IONS-SCNC: 11 MMOL/L (ref 3–16)
BUN BLDV-MCNC: 15 MG/DL (ref 7–20)
CALCIUM SERPL-MCNC: 9 MG/DL (ref 8.3–10.6)
CHLORIDE BLD-SCNC: 104 MMOL/L (ref 99–110)
CHOLESTEROL, TOTAL: 150 MG/DL (ref 0–199)
CO2: 25 MMOL/L (ref 21–32)
CREAT SERPL-MCNC: 1.2 MG/DL (ref 0.9–1.3)
GFR AFRICAN AMERICAN: >60
GFR NON-AFRICAN AMERICAN: >60
GLUCOSE BLD-MCNC: 106 MG/DL (ref 70–99)
HCT VFR BLD CALC: 41.8 % (ref 40.5–52.5)
HDLC SERPL-MCNC: 37 MG/DL (ref 40–60)
HEMOGLOBIN: 14.4 G/DL (ref 13.5–17.5)
LDL CHOLESTEROL CALCULATED: 83 MG/DL
LEFT VENTRICULAR EJECTION FRACTION MODE: NORMAL
LV EF: 60 %
MAGNESIUM: 2.1 MG/DL (ref 1.8–2.4)
MCH RBC QN AUTO: 31 PG (ref 26–34)
MCHC RBC AUTO-ENTMCNC: 34.5 G/DL (ref 31–36)
MCV RBC AUTO: 89.9 FL (ref 80–100)
PDW BLD-RTO: 13.8 % (ref 12.4–15.4)
PLATELET # BLD: 172 K/UL (ref 135–450)
PMV BLD AUTO: 8.1 FL (ref 5–10.5)
POTASSIUM REFLEX MAGNESIUM: 4.3 MMOL/L (ref 3.5–5.1)
RBC # BLD: 4.64 M/UL (ref 4.2–5.9)
SODIUM BLD-SCNC: 140 MMOL/L (ref 136–145)
TRIGL SERPL-MCNC: 148 MG/DL (ref 0–150)
TROPONIN: <0.01 NG/ML
TSH REFLEX: 1.02 UIU/ML (ref 0.27–4.2)
VLDLC SERPL CALC-MCNC: 30 MG/DL
WBC # BLD: 6.5 K/UL (ref 4–11)

## 2021-08-31 PROCEDURE — 84484 ASSAY OF TROPONIN QUANT: CPT

## 2021-08-31 PROCEDURE — 97530 THERAPEUTIC ACTIVITIES: CPT

## 2021-08-31 PROCEDURE — 99153 MOD SED SAME PHYS/QHP EA: CPT | Performed by: RADIOLOGY

## 2021-08-31 PROCEDURE — 2580000003 HC RX 258: Performed by: INTERNAL MEDICINE

## 2021-08-31 PROCEDURE — G0378 HOSPITAL OBSERVATION PER HR: HCPCS

## 2021-08-31 PROCEDURE — 6370000000 HC RX 637 (ALT 250 FOR IP): Performed by: INTERNAL MEDICINE

## 2021-08-31 PROCEDURE — 2709999900 HC NON-CHARGEABLE SUPPLY

## 2021-08-31 PROCEDURE — 84443 ASSAY THYROID STIM HORMONE: CPT

## 2021-08-31 PROCEDURE — 97116 GAIT TRAINING THERAPY: CPT

## 2021-08-31 PROCEDURE — 83735 ASSAY OF MAGNESIUM: CPT

## 2021-08-31 PROCEDURE — 80061 LIPID PANEL: CPT

## 2021-08-31 PROCEDURE — 36415 COLL VENOUS BLD VENIPUNCTURE: CPT

## 2021-08-31 PROCEDURE — 93458 L HRT ARTERY/VENTRICLE ANGIO: CPT | Performed by: RADIOLOGY

## 2021-08-31 PROCEDURE — C1894 INTRO/SHEATH, NON-LASER: HCPCS

## 2021-08-31 PROCEDURE — 99152 MOD SED SAME PHYS/QHP 5/>YRS: CPT | Performed by: RADIOLOGY

## 2021-08-31 PROCEDURE — 97165 OT EVAL LOW COMPLEX 30 MIN: CPT

## 2021-08-31 PROCEDURE — 99233 SBSQ HOSP IP/OBS HIGH 50: CPT | Performed by: NURSE PRACTITIONER

## 2021-08-31 PROCEDURE — 97161 PT EVAL LOW COMPLEX 20 MIN: CPT

## 2021-08-31 PROCEDURE — 97535 SELF CARE MNGMENT TRAINING: CPT

## 2021-08-31 PROCEDURE — 85027 COMPLETE CBC AUTOMATED: CPT

## 2021-08-31 PROCEDURE — 6360000004 HC RX CONTRAST MEDICATION: Performed by: INTERNAL MEDICINE

## 2021-08-31 PROCEDURE — 2500000003 HC RX 250 WO HCPCS

## 2021-08-31 PROCEDURE — C1769 GUIDE WIRE: HCPCS

## 2021-08-31 PROCEDURE — 94760 N-INVAS EAR/PLS OXIMETRY 1: CPT

## 2021-08-31 PROCEDURE — 80048 BASIC METABOLIC PNL TOTAL CA: CPT

## 2021-08-31 PROCEDURE — 6360000002 HC RX W HCPCS

## 2021-08-31 RX ORDER — ACETAMINOPHEN 325 MG/1
650 TABLET ORAL EVERY 4 HOURS PRN
Status: DISCONTINUED | OUTPATIENT
Start: 2021-08-31 | End: 2021-09-01 | Stop reason: HOSPADM

## 2021-08-31 RX ORDER — SODIUM CHLORIDE 9 MG/ML
INJECTION, SOLUTION INTRAVENOUS CONTINUOUS
Status: CANCELLED | OUTPATIENT
Start: 2021-08-31

## 2021-08-31 RX ORDER — SODIUM CHLORIDE 0.9 % (FLUSH) 0.9 %
5-40 SYRINGE (ML) INJECTION EVERY 12 HOURS SCHEDULED
Status: CANCELLED | OUTPATIENT
Start: 2021-08-31

## 2021-08-31 RX ORDER — SODIUM CHLORIDE 9 MG/ML
INJECTION, SOLUTION INTRAVENOUS CONTINUOUS
Status: ACTIVE | OUTPATIENT
Start: 2021-08-31 | End: 2021-08-31

## 2021-08-31 RX ADMIN — METOPROLOL TARTRATE 25 MG: 25 TABLET, FILM COATED ORAL at 09:53

## 2021-08-31 RX ADMIN — Medication 10 ML: at 09:53

## 2021-08-31 RX ADMIN — IOHEXOL 120 ML: 350 INJECTION, SOLUTION INTRAVENOUS at 13:27

## 2021-08-31 RX ADMIN — DULOXETINE HYDROCHLORIDE 60 MG: 30 CAPSULE, DELAYED RELEASE ORAL at 20:40

## 2021-08-31 RX ADMIN — SODIUM CHLORIDE: 9 INJECTION, SOLUTION INTRAVENOUS at 15:41

## 2021-08-31 RX ADMIN — ATORVASTATIN CALCIUM 80 MG: 40 TABLET, FILM COATED ORAL at 20:40

## 2021-08-31 RX ADMIN — LISINOPRIL 30 MG: 20 TABLET ORAL at 09:52

## 2021-08-31 RX ADMIN — ASPIRIN 81 MG: 81 TABLET, COATED ORAL at 09:53

## 2021-08-31 RX ADMIN — FAMOTIDINE 20 MG: 20 TABLET, FILM COATED ORAL at 09:53

## 2021-08-31 RX ADMIN — GABAPENTIN 400 MG: 100 CAPSULE ORAL at 15:37

## 2021-08-31 RX ADMIN — METOPROLOL TARTRATE 25 MG: 25 TABLET, FILM COATED ORAL at 20:40

## 2021-08-31 RX ADMIN — FAMOTIDINE 20 MG: 20 TABLET, FILM COATED ORAL at 20:40

## 2021-08-31 RX ADMIN — GABAPENTIN 400 MG: 100 CAPSULE ORAL at 09:53

## 2021-08-31 RX ADMIN — GABAPENTIN 400 MG: 100 CAPSULE ORAL at 20:39

## 2021-08-31 ASSESSMENT — PAIN DESCRIPTION - PAIN TYPE: TYPE: ACUTE PAIN

## 2021-08-31 ASSESSMENT — PAIN SCALES - GENERAL
PAINLEVEL_OUTOF10: 0
PAINLEVEL_OUTOF10: 1

## 2021-08-31 ASSESSMENT — PAIN DESCRIPTION - DESCRIPTORS: DESCRIPTORS: TIGHTNESS

## 2021-08-31 ASSESSMENT — PAIN DESCRIPTION - DIRECTION: RADIATING_TOWARDS: ARM

## 2021-08-31 ASSESSMENT — PAIN - FUNCTIONAL ASSESSMENT: PAIN_FUNCTIONAL_ASSESSMENT: ACTIVITIES ARE NOT PREVENTED

## 2021-08-31 ASSESSMENT — PAIN DESCRIPTION - ORIENTATION: ORIENTATION: LEFT

## 2021-08-31 ASSESSMENT — PAIN DESCRIPTION - PROGRESSION
CLINICAL_PROGRESSION: GRADUALLY IMPROVING
CLINICAL_PROGRESSION: NOT CHANGED

## 2021-08-31 ASSESSMENT — PAIN DESCRIPTION - LOCATION: LOCATION: CHEST

## 2021-08-31 ASSESSMENT — PAIN DESCRIPTION - FREQUENCY: FREQUENCY: CONTINUOUS

## 2021-08-31 ASSESSMENT — PAIN DESCRIPTION - ONSET: ONSET: ON-GOING

## 2021-08-31 NOTE — PROGRESS NOTES
was Chest pain, unspecified type. has a past medical history of Hyperlipidemia, Hypertension, and Stroke ([address] has no past surgical history on file. Restrictions  Position Activity Restriction  Other position/activity restrictions: up as tolerated    Subjective   General  Chart Reviewed: Yes  Patient assessed for rehabilitation services?: Yes  Additional Pertinent Hx: 49 y.o. male who presented to [facility] with chest pain that began while carrying in groceries this afternoon, lasted for about 20 minutes and resolved with rest. The sx were associated with diaphoresis. He has a h/o HTN, HLD, CVA.   Referring Practitioner: Noe Odonnell MD  Diagnosis: chest pain  Subjective  Subjective: Pt in bed upon arrival, reporting residual pain in chest 1/10, agreeable to session  Patient Currently in Pain: Denies  Pain Assessment  Pain Assessment: 0-10  Pain Level: 0  Vital Signs  Temp: 97.8 °F (36.6 °C)  Temp Source: Oral  Pulse: 77  Heart Rate Source: Monitor  Resp: 16  BP: (!) 134/90  BP Location: Left lower arm  MAP (mmHg): 101  Patient Position: Semi fowlers  Level of Consciousness: Alert (0)  MEWS Score: 1  Patient Currently in Pain: Denies  Oxygen Therapy  SpO2: 93 %  Pulse Oximeter Device Mode: Intermittent  Pulse Oximeter Device Location: Finger  O2 Device: None (Room air)  Social/Functional History  Social/Functional History  Lives With: Spouse  Type of Home: House  Home Layout: Two level, Able to Live on Main level with bedroom/bathroom (stating hardly ever goes upstairs)  Home Access: Stairs to enter with rails  Entrance Stairs - Number of Steps: 4  Entrance Stairs - Rails: Right  Bathroom Shower/Tub: Walk-in shower  Bathroom Toilet: Handicap height (sink/vanity to push up)  Bathroom Equipment: Shower chair  Bathroom Accessibility: Walker accessible  Home Equipment: Cane, Rolling walker  ADL Assistance: Independent  Homemaking Assistance: Independent (does cleaning and laundry, \"I take my time doing it, sometimes i have to sit down and rest\")  Ambulation Assistance: Independent (cane)  Transfer Assistance: Independent  Active : Yes  Occupation: On [address] [place] [address]: doing stuff around the house  Additional Comments: stroke in 2015 with residual deficits L sided, reporting no falls in past 6 months       Objective   Vision: Impaired  Vision Exceptions: Wears glasses at all times  Hearing: Within functional limits    Orientation  Overall Orientation Status: Within Normal Limits     Balance  Sitting Balance: Independent  Standing Balance: Supervision  Standing Balance  Time: ~10-15 min  Activity: mobility in room, hallway, stance at sink, amb 100' cane  Functional Mobility  Functional - Mobility Device: Cane  Activity: To/from bathroom; Other  Assist Level: Supervision  Functional Mobility Comments: slow but steady with cane, no LOB  Toilet Transfers  Toilet - Technique: Ambulating  Equipment Used: Standard toilet  Toilet Transfer: Supervision  ADL  Grooming: Supervision (stance at sink, oral care, wash face/hands)  LE Dressing: Supervision (james/doff socks)  Toileting: Supervision        Bed mobility  Supine to Sit: Modified independent  Scooting: Independent  Transfers  Sit to stand: Supervision;Modified independent  Stand to sit: Modified independent;Supervision     Cognition  Overall Cognitive Status: WNL        Sensation  Overall Sensation Status: Impaired (N/T in L LE from knee down)        LUE AROM (degrees)  LUE AROM : WFL  Left Hand AROM (degrees)  Left Hand AROM: WFL  RUE AROM (degrees)  RUE AROM : WFL  Right Hand AROM (degrees)  Right Hand AROM: WFL  LUE Strength  L Hand General: 4/5  LUE Strength Comment: prev stroke 2015  RUE Strength  Gross RUE Strength: WFL  R Hand General: 5/5                     AM-PAC Score        AM-PAC Inpatient Daily Activity Raw Score: 22 (08/31/21 1413)  AM-PAC Inpatient ADL T-Scale Score : 47.1 (08/31/21 1413)  ADL Inpatient CMS 0-100% Score: 25.8 (08/31/21 1413)  ADL Inpatient CMS G-Code Modifier : CJ (08/31/21 1413)      Therapy Time   Individual Concurrent Group Co-treatment   Time In 0910         Time Out 0949         Minutes 39           Timed Code Treatment Minutes:  23 Minutes    Total Treatment Minutes:  [address], MOT, OTR/L

## 2021-08-31 NOTE — PROGRESS NOTES
[address]   Cardiology  Note   Dr Joyce Boo MD, Tristonlakshmi Uribe FNP APRN CVNP  Date: 8/31/2021  Admit Date: 8/30/2021     Pt of Dr. Thong Lamar   CC: cp /exertional   PMH: HTN, HLD, CVA. He had a negative stress test 2 months ago. Interval Hx /  Subjective: Today, he is resting in bed   Diastolic b/p elevated  /  in NSR   continues with mild chest tightness   No major events overnight. Cont with asa, statin, metoprolol  Plan C this afternoon      Patient seen and examined. Clinical notes reviewed.  Telemetry reviewed / testing reviewed  30 minutes   Pertinent labs, diagnostic, device, and imaging results reviewed as a part of this visit  EKG TTE stress test: any LHC/RHC reviewed     Past Medical History:  Past Medical History:   Diagnosis Date    Hyperlipidemia     Hypertension     [address]roke [address] [address]     2012          Scheduled Meds:   nitroGLYCERIN  0.4 mg Sublingual Once    metoprolol tartrate  25 mg Oral BID    aspirin  81 mg Oral Daily    aspirin  324 mg Oral Once    atorvastatin  80 mg Oral Nightly    DULoxetine  60 mg Oral Daily    gabapentin  400 mg Oral TID    lisinopril  30 mg Oral Daily    sodium chloride flush  10 mL IntraVENous 2 times per day    enoxaparin  40 mg SubCUTAneous Daily    sennosides-docusate sodium  1 tablet Oral BID    famotidine  20 mg Oral BID     Continuous Infusions:   sodium chloride       Vitals:    08/31/21 0819   BP: (!) 127/103   Pulse:    Resp:    Temp:    SpO2:       In: 360 [P.O.:360]  Out: -    Wt Readings from Last 3 Encounters:   08/31/21 198 lb 3.2 oz (89.9 kg)   06/15/21 200 lb (90.7 kg)       Intake/Output Summary (Last 24 hours) at 8/31/2021 1051  Last data filed at 8/31/2021 0900  Gross per 24 hour   Intake 360 ml   Output --   Net 360 ml       Telemetry: Personally Reviewed    · Constitutional: Cooperative and in no apparent distress, and appears well nourished  · Skin: Warm and pink; no pallor, cyanosis, clubbing, or bruising   · HEENT: Symmetric and normocephalic. PERRL, EOM intact. Conjunctiva pink with clear sclera. Mucus membranes moist.   · Cardiovascular: Regular rate and rhythm. S1/S2 present without murmurs, no rubs or gallops. Peripheral pulses 2+, capillary refill < 3 seconds. No elevation of JVP. No peripheral edema  · Respiratory: Respirations symmetric and unlabored. Lungs clear to auscultation bilaterally, no wheezing, crackles, or rhonchi  · Gastrointestinal: Abdomen soft and round. Bowel sounds normoactive without tenderness or masses. · Musculoskeletal: Bilateral upper and lower extremity strength 5/5 with full ROM  · Neurologic/Psych: Awake and orientated to person, place and time. Calm affect, appropriate mood        Problem List:   Patient Active Problem List    Diagnosis Date Noted    Chest pain 08/30/2021        Assessment     cp /exertional   negative stress test 2 months ago. HTN  diastolic elevated     HLD  On statin    Hx CVA    Plan  continues with mild chest tightness   Cont with asa, statin, metoprolol  Do magnesium  lipids Vit D TSH level   Plan C this afternoon  / sCr 1.2  / CBC wnl     Thank you for allowing to us to participate in the care of Ela Johnston. Steve Payne APRN-CNP-CVNP    [facility]     Patient seen and evaluated and he is case is discussed. And performing angiography at this time for the patient. He understands the procedure he understands the risk.   Amadou Jimenez MD, [facility]

## 2021-08-31 NOTE — PROGRESS NOTES
afternoon, lasted for about 20 minutes and resolved with rest. The sx were associated with diaphoresis. He has a h/o HTN, HLD, CVA. He had a negative stress test 2 months ago.   Response To Previous Treatment: Not applicable  Family / Caregiver Present: No  Diagnosis: chest pain  General Comment  Comments: Pt supinein bed upon approachand agreable to PT  Subjective  Subjective: Pt reports 1/10 pain at rest which increased to 3/10 pain with ambulation and returns to 1/10 pain afterward          Orientation  Orientation  Overall Orientation Status: Within Normal Limits  Social/Functional History  Social/Functional History  Lives With: Spouse  Type of Home: House  Home Layout: Two level, Able to Live on Main level with bedroom/bathroom (stating hardly ever goes upstairs)  Home Access: Stairs to enter with rails  Entrance Stairs - Number of Steps: 4  Entrance Stairs - Rails: Right  Bathroom Shower/Tub: Walk-in shower  Bathroom Toilet: Handicap height (sink/vanity to push up)  Bathroom Equipment: Shower chair  Bathroom Accessibility: Walker accessible  Home Equipment: Cane, Rolling walker  ADL Assistance: [facility]: Independent (does cleaning and laundry, \"I take my time doing it, sometimes i have to sit down and rest\")  Ambulation Assistance: Independent (cane)  Transfer Assistance: Independent  Active : Yes  Occupation: On disability  Leisure & Hobbies: doing stuff around the house  Additional Comments: stroke in 2015 with residual deficits L sided, reporting no falls in past 6 months  Cognition        Objective          AROM RLE (degrees)  RLE AROM: WFL  RLE General AROM: observed during functional mobility throughout session  AROM LLE (degrees)  LLE AROM : WFL  LLE General AROM: observed with functional mobility throughout session  Strength RLE  Strength RLE: WNL  Comment: Pt 4+/5 o higher during MMTs  Strength LLE  Strength LLE: WNL  Comment: pt 3+/5 or higher globally during MMTs with decreased strength L LE compared to R LE however pt reports this is his baseline     Sensation  Overall Sensation Status: Impaired (N/T in L LE from knee down)  Bed mobility  Supine to Sit: Modified independent (HOB slightly elevated)  Scooting: Independent (seated at EOB)  Transfers  Sit to Stand: Supervision  Stand to sit: Supervision  Comment: sit<>stand from EOB, commode with cane  Ambulation  Ambulation?: Yes  More Ambulation?: No  Ambulation 1  Surface: level tile  Device: Single point cane  Assistance: Supervision  Quality of Gait: decreased stance time L LE  Gait Deviations: Slow Shirin  Distance: 100' x 2  Comments: pt limited by SOB/fatigue, pt experiences minor increase in chest pain (1/10>>3/10) with ambulation that returns to 1/10 with rest; SpO2 and HR stable upon completion of ambulation     Balance  Sitting - Static: Good  Sitting - Dynamic: Good  Standing - Static: Good;-  Standing - Dynamic: Good;-  Comments: independent for seated balance EOB including donning/doffing socks and during MMTs, supervision for all standing balance including ~5-7 minutes standing at sink with completing ADLs with UL/ no UE support and ambulation with cane.         Plan   Plan  Times per week: dcd  Safety Devices  Type of devices: Gait belt, Call light within reach, Chair alarm in place, Left in chair, Nurse notified    G-Code       OutComes Score                                                  AM-PAC Score  AM-PAC Inpatient Mobility Raw Score : 20 (08/31/21 1142)  AM-PAC Inpatient T-Scale Score : 47.67 (08/31/21 1142)  Mobility Inpatient CMS 0-100% Score: 35.83 (08/31/21 1142)  Mobility Inpatient CMS G-Code Modifier : Marvel Best (08/31/21 1142)          Goals  Patient Goals   Patient goals : dcd       Therapy Time   Individual Concurrent Group Co-treatment   Time In 0910         Time Out 0948         Minutes 38         Timed Code Treatment Minutes: [address] [facility], [address] [place] [address]

## 2021-08-31 NOTE — PROGRESS NOTES
Shift Assessment from this RN noted deficits related to prior Stroke. Left sided facial droop, smile unequal, numbness in mandible. Left arm/hand weakness, decreased sensation, very minimal hand , able to lift without resitance. Left leg weakness, decreased sensation, very minimal plantar and dorsiflexion, able to lift without resistance. Per the patient, this assessment is accurate and is his baseline.

## 2021-08-31 NOTE — PROGRESS NOTES
Pharmacy just recently approved Lovenox. Perfect Serve sent to covering MD as follows:     8/30/21 11:44PM  RN: Petrona Vuong RM: [id number] Patient was admitted today for chest pain, was given 4- 81mg ASA by EMS. Has Lovenox ordered, order was just recently verified by pharmacy. Should have cardiac cath in the AM... would you like me to still give or hold it? 8/30/21 11:45 PM   MD Efren Blizzard: Ok to give the Lovenox      Medication given per [facility]

## 2021-08-31 NOTE — PROGRESS NOTES
Pt scheduled for a cardiac cath today. Received a call from the department he will be scheduled for 12:30. Clarified which medications he is ok to receive today - ok to take aspirin and other AM meds, AM dose of lovenox to be held. Lovenox hold noted on emar and AM medications administered as noted.

## 2021-08-31 NOTE — PROCEDURES
[facility]                                                LEFT HEART CATH    Maeve Renteria   49 y.o., male  1970 8/31/2021    Procedure performed by Dr. Fallon Royal MD, [facility] [place]  Surgical assistants :none    Procedure  Selective Coronary Angiography  Cardiac Catheterization for Coronary Anatomy  Left Heart Catheterization  Left Ventriculogram  Radial artery access assisted by ultrasound  Arterial Access Right Radial Artery after a negative Zac test  TR Band    Indication:Cardiac cath to rule out ischemic CAD, Possible angioplasty or positive stress test  Unspecified Angina  Anesthesia: Moderate sedation with Versed and Fentanyl IV  Anesthesia Start time 0326  Anesthesia end time 1328  Estimated blood loss :minimal  Abnormal Stress Test    CPT code 84399  CPT code 35686  CPT code 68377  CPT code 80140 at 2 units      Procedure Description:  After written informed consent was obtained, the patient was   brought to the cardiac catheterization suite, where patient was prepped and   draped in the usual sterile fashion. Local anesthesia was achieved in the   right wrist with 2% lidocaine. A 5-Slovenian hemostasis sheath was placed into   the radial artery. The pre cocktail of heparin, verapamil and nitroglycerin was injected into the sheath    The JR4 catheter was introduced  to engage the right coronary   artery. Radiographic  images were obtained. The catheter was removed and exchanged over an exchange length 0.35 soft guide wire. .         A 5-Slovenian JL 3.5 catheter was introduced and was too short. We then used a JL4 which is also too short. We then used an AL 1 diagnostic; used to engage the left main   coronary artery.  Radiographic  images were obtained. The catheter was pulled back . The JL 3.5 catheter was then positioned in the left ventricle. Left ventriculogram was performed by hand-injection. After these images were obtained. , the   catheter was flushed, pulled back across the aortic valve revealing no gradient. The catheter was removed. The hemostasis sheath was removed and hemostasis was achieved using a TR Band     There were no complications. Patient tolerated the procedure well. The   patient was transferred to the holding area in stable condition. Contrast consumed 120 cc  Flouro time 8.4 minutes    Results:  Left ventricular pressure 12 mmHg  Aortic pressure 140 mmHg    Coronary anatomy:   The left main coronary artery is normal.     Left anterior descending artery has mid vessel atherosclerotic plaque but no significant stenosis. Probably 40%. Circumflex artery is normal.    The right coronary artery is a dominant vessel and has proximal and mid vessel atherosclerotic plaquing without significant stenoses. Left ventriculogram shows ejection fraction of 60 %. Normal wall motion. Impression:  Minor coronary artery plaque as noted above. No significant stenoses. Nothing that needed intervention. False positive stress test     Complications: none      Plan:  Primary prevention  Medical management\  Optimize LDL at 60 or less. The current Lipitor is 80 mg since admission but was 20 mg preadmission. The current most recent LDL is unknown through our system. Blood pressure management and control. Aspirin daily. Given his history of previous CVA it is probably worthwhile to add Plavix to the aspirin.       This note was likely completed using voice recognition technology and may contain unintended errors  Lamonte Asif MD ., [facility] [place]    Cc; JULIA Gomez - CNP

## 2021-08-31 NOTE — ANESTHESIA PRE-OP
Brief Pre-Op Note/Sedation Assessment      Basilio Dominguez  1970  [id number]      [id number]  1:30 PM    Planned Procedure: Cardiac Catheterization Procedure    Post Procedure Plan: Return to same level of care    Consent: I have discussed with the patient and/or the patient representative the indication, alternatives, and the possible risks and/or complications of the planned procedure and the anesthesia methods. The patient and/or patient representative appear to understand and agree to proceed. Chief Complaint: Chest Pain/Pressure  Anginal Equivalent      Indications for Cath Procedure: Worsening Angina and Stable Known CAD  Anginal Classification within 2 weeks:  CCS I - Angina only during strenuous or prolonged physical activity  NYHA Heart Failure Class within 2 weeks: No symptoms  Is Cath Lab Visit Valve-related?: No  Surgical Risk: Low  Functional Type: >= 4 METS without symptoms    Anti- Anginal Meds within 2 weeks:   Yes: Beta Blockers    Stress or Imaging Studies Performed (within 6 months):  Stress Test with SPECT Result: Indeterminate Risk/Extent of Ischemia:  Low      Vital Signs:  BP (!) 120/92   Pulse 74   Temp 98.1 °F (36.7 °C) (Oral)   Resp 14   Ht 5' 8\" (1.727 m)   Wt 198 lb 3.2 oz (89.9 kg)   SpO2 95%   BMI 30.14 kg/m²     Allergies: Allergies   Allergen Reactions    Codeine        Past Medical History:  Past Medical History:   Diagnosis Date    Hyperlipidemia     Hypertension     Stroke [facility])     2012         Surgical History:  History reviewed. No pertinent surgical history.       Medications:  Current Facility-Administered Medications   Medication Dose Route Frequency Provider Last Rate Last Admin    nitroGLYCERIN (NITROSTAT) SL tablet 0.4 mg  0.4 mg SubLINGual Once Levi Thompson MD        metoprolol tartrate (LOPRESSOR) tablet 25 mg  25 mg Oral BID Carlotta Vasquez MD   25 mg at 08/31/21 0953    aspirin EC tablet 81 mg  81 mg Oral Daily Carlotta Vasquez MD   81 mg at 08/31/21 1173    aspirin chewable tablet 324 mg  324 mg Oral Once August Frausto MD        atorvastatin (LIPITOR) tablet 80 mg  80 mg Oral Nightly August Frausto MD   80 mg at 08/30/21 2116    hydrALAZINE (APRESOLINE) injection 10 mg  10 mg IntraVENous Q6H PRN August Frausto MD        DULoxetine (CYMBALTA) extended release capsule 60 mg  60 mg Oral Daily August Frausto MD        gabapentin (NEURONTIN) capsule 400 mg  400 mg Oral TID August Frausto MD   400 mg at 08/31/21 0953    lisinopril (PRINIVIL;ZESTRIL) tablet 30 mg  30 mg Oral Daily August Frausto MD   30 mg at 08/31/21 0952    0.9 % sodium chloride infusion  25 mL IntraVENous PRN August Frausto MD        enoxaparin (LOVENOX) injection 40 mg  40 mg SubCUTAneous Daily August Frausto MD   40 mg at 08/30/21 2347    ondansetron (ZOFRAN-ODT) disintegrating tablet 4 mg  4 mg Oral Q8H PRN August Frausto MD        Or    ondansetron [facility]) injection 4 mg  4 mg IntraVENous Q6H PRN August Frausto MD        sennosides-docusate sodium (SENOKOT-S) 8.6-50 MG tablet 1 tablet  1 tablet Oral BID August Frausto MD        magnesium hydroxide (MILK OF MAGNESIA) 400 MG/5ML suspension 30 mL  30 mL Oral Daily PRN August Frausto MD        famotidine (PEPCID) tablet 20 mg  20 mg Oral BID August Frausto MD   20 mg at 08/31/21 6644    acetaminophen (TYLENOL) tablet 650 mg  650 mg Oral Q6H PRN August Frausto MD        Or   [facility] acetaminophen (TYLENOL) suppository 650 mg  650 mg Rectal Q6H PRN August Frausto MD        nitroGLYCERIN (NITROSTAT) SL tablet 0.4 mg  0.4 mg SubLINGual Q5 Min PRN Joseph Rinaldi MD   0.4 mg at 08/30/21 2116           Pre-Sedation:    Pre-Sedation Documentation and Exam:  I have personally completed a history, physical exam & review of systems for this patient (see notes).     Prior History of Anesthesia Complications:   none    Modified Mallampati:  I (soft palate, uvula, fauces, tonsillar pillars visible)    ASA Classification:  Class 1 - A normal

## 2021-08-31 NOTE — PROGRESS NOTES
Hospitalist Progress Note      PCP: Megan Sykes, APRN - CNP    Date of Admission: 2021    Chief Complaint on Admission: exertional chest pain    Pt Seen/Examined and Chart Reviewed. Admitting dx unstable angina    SUBJECTIVE/OBJECTIVE:   Patient reports ongoing chest tightness, not as bad as yesterday, down to 1. Some dyspnea with it. No N/V/D    Awaiting cardiac cath this morning. Allergies  Codeine    Medications      Scheduled Meds:   nitroGLYCERIN  0.4 mg Sublingual Once    metoprolol tartrate  25 mg Oral BID    aspirin  81 mg Oral Daily    aspirin  324 mg Oral Once    atorvastatin  80 mg Oral Nightly    DULoxetine  60 mg Oral Daily    gabapentin  400 mg Oral TID    lisinopril  30 mg Oral Daily    sodium chloride flush  10 mL IntraVENous 2 times per day    enoxaparin  40 mg SubCUTAneous Daily    sennosides-docusate sodium  1 tablet Oral BID    famotidine  20 mg Oral BID       Infusions:   sodium chloride         PRN Meds:  hydrALAZINE, sodium chloride flush, sodium chloride, ondansetron **OR** ondansetron, magnesium hydroxide, acetaminophen **OR** acetaminophen, nitroGLYCERIN    Vitals    TEMPERATURE:  Current - Temp: 98.2 °F (36.8 °C); Max - Temp  Av.2 °F (36.8 °C)  Min: 97.8 °F (36.6 °C)  Max: 98.4 °F (36.9 °C)  RESPIRATIONS RANGE: Resp  Av.3  Min: 10  Max: 24  PULSE RANGE: Pulse  Av.7  Min: 68  Max: 89  BLOOD PRESSURE RANGE:  Systolic (83BBI), XTN:955 , Min:111 , IZJ:988   ; Diastolic (77CUT), GV, Min:75, Max:108    PULSE OXIMETRY RANGE: SpO2  Av.2 %  Min: 90 %  Max: 99 %  24HR INTAKE/OUTPUT:      Intake/Output Summary (Last 24 hours) at 2021 0920  Last data filed at 2021 0900  Gross per 24 hour   Intake 360 ml   Output --   Net 360 ml       Exam:      General appearance: No apparent distress, appears stated age and cooperative. Lungs: Clear to ascultation, bilaterally without Rales/Wheezes/Rhonchi with good respiratory effort.   Heart: Regular rate and rhythm with Normal S1/S2 without  murmurs, rubs or gallops, point of maximum impulse non-displaced  Abdomen: Soft, non-tender or non-distended without rigidity or guarding and positive bowel sounds all four quadrants. Extremities: No clubbing, cyanosis, or edema bilaterally. Skin: Skin color, texture, turgor normal.    Neurologic: Alert and oriented X 3,   grossly non-focal.  Mental status: Alert, oriented, thought content appropriate. Data    Recent Labs     08/30/21  1528 08/31/21  0431   WBC 5.7 6.5   HGB 14.4 14.4   HCT 42.1 41.8    172      Recent Labs     08/30/21  1528 08/31/21  0431    140   K 3.6 4.3    104   CO2 22 25   BUN 11 15   CREATININE 1.0 1.2     Recent Labs     08/30/21  1655   AST 12*   ALT 15   BILIDIR <0.2   BILITOT 0.4   ALKPHOS 90     No results for input(s): INR in the last 72 hours.   Recent Labs     08/30/21  1655 08/30/21  2325 08/31/21  0431   TROPONINI <0.01 <0.01 <0.01       Consults:     IP CONSULT TO CARDIOLOGY  IP CONSULT TO HOSPITALIST  IP CONSULT TO CASE MANAGEMENT    Active Hospital Problems    Diagnosis Date Noted    Chest pain [R07.9] 08/30/2021         ASSESSMENT AND PLAN      Chest pain concerning for unstable angina:  Continues to have mild tightness  trops negative  Going for cardiac cath this am  Cont with asa, statin, metoprolol    HTN:  BP coming down with metoprolol and lisinopril    HLD:  High dose statin    H/o CVA      DVT Prophylaxis: lovenox  Diet: Diet NPO Exceptions are: Sips of Water with Meds  Code Status: Full Code    PT/OT Eval Status:at baseline    Dispo - Farooq Weathers MD

## 2021-08-31 NOTE — PLAN OF CARE
Problem: Pain:  Goal: Pain level will decrease  Description: Pain level will decrease  8/31/2021 0913 by Bernardo Mojica RN  Outcome: Ongoing  Note: Pain scale reviewed - pt is able to demonstrate proper use / understanding of the 0-10 pain scale. Pt states he continues to have chest pain rated about a 1 out of 10 and states it is improved since he first came in. Pt decline any needs at this time. Pt instructed to call RN with any needs or if pain increases to manage appropriately. Pt verbalized understanding. Problem: Safety:  Goal: Free from accidental physical injury  Description: Free from accidental physical injury  Outcome: Ongoing   Standard safety precautions in place. Pt instructed to call before getting up. Pt agreeable and calls out appropriately. Call light in reach. Will continue to monitor. Problem: Daily Care:  Goal: Daily care needs are met  Description: Daily care needs are met. Outcome: Ongoing  Morning / shift assessment completed. Pt declines any needs at this time. Pt instructed to call RN with any needs / questions. Pt verbalized understanding. Problem: Skin Integrity:  Goal: Skin integrity will stabilize  Description: Skin integrity will stabilize  Outcome: Ongoing   Assessment completed as documented. No new skin issues noted. Pt able to turn self / independently. Problem: Discharge Planning:  Goal: Patients continuum of care needs are met  Description: Patients continuum of care needs are met  Outcome: Ongoing   Plan of care reviewed with the patient. Received call, pt's cardiac cath is planned for 12:30 today. Pt updated on plan and agreeable. Problem: Falls - Risk of:  Goal: Will remain free from falls  Description: Will remain free from falls  8/31/2021 0913 by Bernardo Mojica RN  Outcome: Ongoing  8/31/2021 0441 by Brionna Tolentino RN  Outcome: Ongoing  Note: Pt remains free of falls.  Safety precautions in place -Bed in lowest position, wheels locked, side rails up 2/4. Call light within reach; pt uses call light appropriately.    Problem: Infection:  Goal: Will remain free from infection  Description: Will remain free from infection  Outcome: Completed

## 2021-08-31 NOTE — PROGRESS NOTES
1343 - Pt returned to room. Report received from [facility]. Vitals stable. Denies pain and states the tightness he had prior is gone. Lois Glez as bedside and adjusting pressure / cuff per protocol.

## 2021-08-31 NOTE — PLAN OF CARE
Problem: Falls - Risk of:  Goal: Will remain free from falls  Description: Will remain free from falls  Outcome: Ongoing  Note: Pt remains free of falls; up ad valdo with steady gait. Patient's BP was orthostatic negative this shift. Bed in lowest position, wheels locked, side rails up 2/4. Possessions and call light within reach; pt uses call light appropriately. Problem: Pain:  Goal: Pain level will decrease  Description: Pain level will decrease  Outcome: Ongoing  Note: Pt able to appropriately rate pain on scale of 0-10. C/o pain in left chest , PRN meds given per [facility] with moderate relief per pt. Educated on pain control measures, verbalized understanding.

## 2021-09-01 VITALS
HEART RATE: 88 BPM | BODY MASS INDEX: 30.04 KG/M2 | HEIGHT: 68 IN | TEMPERATURE: 97.9 F | WEIGHT: 198.2 LBS | SYSTOLIC BLOOD PRESSURE: 123 MMHG | RESPIRATION RATE: 16 BRPM | OXYGEN SATURATION: 96 % | DIASTOLIC BLOOD PRESSURE: 92 MMHG

## 2021-09-01 PROBLEM — I25.118 CORONARY ARTERY DISEASE OF NATIVE ARTERY WITH STABLE ANGINA PECTORIS (HCC): Status: ACTIVE | Noted: 2021-09-01

## 2021-09-01 LAB
ANION GAP SERPL CALCULATED.3IONS-SCNC: 13 MMOL/L (ref 3–16)
BUN BLDV-MCNC: 13 MG/DL (ref 7–20)
CALCIUM SERPL-MCNC: 8.8 MG/DL (ref 8.3–10.6)
CHLORIDE BLD-SCNC: 102 MMOL/L (ref 99–110)
CO2: 25 MMOL/L (ref 21–32)
CREAT SERPL-MCNC: 0.9 MG/DL (ref 0.9–1.3)
GFR AFRICAN AMERICAN: >60
GFR NON-AFRICAN AMERICAN: >60
GLUCOSE BLD-MCNC: 102 MG/DL (ref 70–99)
HCT VFR BLD CALC: 39.2 % (ref 40.5–52.5)
HEMOGLOBIN: 13.7 G/DL (ref 13.5–17.5)
MCH RBC QN AUTO: 31 PG (ref 26–34)
MCHC RBC AUTO-ENTMCNC: 34.8 G/DL (ref 31–36)
MCV RBC AUTO: 89.1 FL (ref 80–100)
PDW BLD-RTO: 13.6 % (ref 12.4–15.4)
PLATELET # BLD: 167 K/UL (ref 135–450)
PMV BLD AUTO: 8.3 FL (ref 5–10.5)
POTASSIUM REFLEX MAGNESIUM: 4.3 MMOL/L (ref 3.5–5.1)
RBC # BLD: 4.4 M/UL (ref 4.2–5.9)
SODIUM BLD-SCNC: 140 MMOL/L (ref 136–145)
TROPONIN: <0.01 NG/ML
VITAMIN D 25-HYDROXY: 41.6 NG/ML
WBC # BLD: 6.6 K/UL (ref 4–11)

## 2021-09-01 PROCEDURE — 82306 VITAMIN D 25 HYDROXY: CPT

## 2021-09-01 PROCEDURE — G0378 HOSPITAL OBSERVATION PER HR: HCPCS

## 2021-09-01 PROCEDURE — 99215 OFFICE O/P EST HI 40 MIN: CPT | Performed by: INTERNAL MEDICINE

## 2021-09-01 PROCEDURE — 84484 ASSAY OF TROPONIN QUANT: CPT

## 2021-09-01 PROCEDURE — 6370000000 HC RX 637 (ALT 250 FOR IP): Performed by: INTERNAL MEDICINE

## 2021-09-01 PROCEDURE — 80048 BASIC METABOLIC PNL TOTAL CA: CPT

## 2021-09-01 PROCEDURE — 85027 COMPLETE CBC AUTOMATED: CPT

## 2021-09-01 RX ORDER — NITROGLYCERIN 0.4 MG/1
TABLET SUBLINGUAL
Qty: 25 TABLET | Refills: 3 | Status: SHIPPED | OUTPATIENT
Start: 2021-09-01 | End: 2021-12-03 | Stop reason: SDUPTHER

## 2021-09-01 RX ORDER — METOPROLOL SUCCINATE 50 MG/1
50 TABLET, EXTENDED RELEASE ORAL DAILY
Qty: 30 TABLET | Refills: 3 | Status: SHIPPED | OUTPATIENT
Start: 2021-09-01 | End: 2021-11-03 | Stop reason: SDUPTHER

## 2021-09-01 RX ORDER — METOPROLOL SUCCINATE 50 MG/1
50 TABLET, EXTENDED RELEASE ORAL DAILY
Status: DISCONTINUED | OUTPATIENT
Start: 2021-09-01 | End: 2021-09-01 | Stop reason: HOSPADM

## 2021-09-01 RX ORDER — ATORVASTATIN CALCIUM 80 MG/1
80 TABLET, FILM COATED ORAL NIGHTLY
Qty: 30 TABLET | Refills: 3 | Status: SHIPPED | OUTPATIENT
Start: 2021-09-01 | End: 2021-11-03 | Stop reason: SDUPTHER

## 2021-09-01 RX ADMIN — FAMOTIDINE 20 MG: 20 TABLET, FILM COATED ORAL at 08:40

## 2021-09-01 RX ADMIN — LISINOPRIL 30 MG: 20 TABLET ORAL at 08:40

## 2021-09-01 RX ADMIN — GABAPENTIN 400 MG: 100 CAPSULE ORAL at 08:45

## 2021-09-01 RX ADMIN — METOPROLOL SUCCINATE 50 MG: 50 TABLET, EXTENDED RELEASE ORAL at 08:44

## 2021-09-01 RX ADMIN — ASPIRIN 81 MG: 81 TABLET, COATED ORAL at 08:45

## 2021-09-01 ASSESSMENT — PAIN SCALES - GENERAL
PAINLEVEL_OUTOF10: 0
PAINLEVEL_OUTOF10: 0

## 2021-09-01 NOTE — PROGRESS NOTES
S: No angina. No sob. Feels much better. O:  Blood pressure 122/81, pulse 77, temperature 97.9 °F (36.6 °C), temperature source Oral, resp. rate 16, height 5' 8\" (1.727 m), weight 198 lb 3.2 oz (89.9 kg), SpO2 94 %. General (appearance): Well devel. No distress  Eyes: Anicteric. EOMI. Neck: Supple. No JVD  Ears/Nose/Mouth/Thorat: No cyanosis  CV: RRR   Respiratory:  Clear, normal respiratory effort  GI: abd s/nt/nd  Skin: Warm, dry. No rashes  Neuro/Psych: Alert and oriented x 3. Appropriate behavior  Ext:  No c/c. No edema  Pulses:  2+ R radial    CBC: Recent Labs     08/30/21  1528 08/31/21  0431 09/01/21  0424   WBC 5.7 6.5 6.6   HGB 14.4 14.4 13.7   HCT 42.1 41.8 39.2*   MCV 89.9 89.9 89.1    172 167     BMP:   Recent Labs     08/30/21  1528 08/31/21  0431 09/01/21  0424    140 140   K 3.6 4.3 4.3    104 102   CO2 22 25 25   BUN 11 15 13   CREATININE 1.0 1.2 0.9     Mag:   Lab Results   Component Value Date    MG 2.10 08/31/2021     LIVER PROFILE:   Recent Labs     08/30/21  1655   AST 12*   ALT 15   BILIDIR <0.2   BILITOT 0.4   ALKPHOS 90     PT/INR: No results for input(s): PROTIME, INR in the last 72 hours. APTT: No results for input(s): APTT in the last 72 hours. BNP:  No results for input(s): BNP in the last 72 hours. CARDIAC ENZYMES:   Recent Labs     08/31/21  1528 08/31/21  2142 09/01/21  0424   TROPONINI <0.01 <0.01 <0.01     8/2021 LHC:  LMCA normal  LAD: 40% stenosis  LCx: Normal  RCA: no sig stenosis    ECG: NSR with inferior Q waves     6/2021 Nuc Stress: No ischemia.  EF normal     6/2021 PE CT: No PE.       Current Facility-Administered Medications:     acetaminophen (TYLENOL) tablet 650 mg, 650 mg, Oral, Q4H PRN, Brooks Shannon MD    nitroGLYCERIN (NITROSTAT) SL tablet 0.4 mg, 0.4 mg, SubLINGual, Once, Michael Armstrong MD    metoprolol tartrate (LOPRESSOR) tablet 25 mg, 25 mg, Oral, BID, Torey Hdez MD, 25 mg at 08/31/21 2040    aspirin EC tablet 81 mg, 81 mg,

## 2021-09-01 NOTE — PLAN OF CARE
Problem: Pain:  Goal: Pain level will decrease  Description: Pain level will decrease  Note: Pt had no complaints of pain this shift. Problem: Safety:  Goal: Free from accidental physical injury  Description: Free from accidental physical injury  Note: Pt calling out appropriately to ambulate in room. Utilizes cane to help with left sided weakness. Problem: Falls - Risk of:  Goal: Will remain free from falls  Description: Will remain free from falls  Note: Pt is a High fall risk r/t left sided weakness and use of ambulatory aid. See Ragena Papa Fall Score and ABCDS Injury Risk assessments. Explained fall risk precautions to pt and family and rationale behind their use to keep the patient safe. Pt bed is in low position, side rails up, call light and belongings are in reach. Fall wristband applied and present on pts wrist.  Bed alarm on. Pt encouraged to call for assistance. Will continue with hourly rounds for PO intake, pain needs, toileting and repositioning as needed.

## 2021-09-01 NOTE — CARE COORDINATION
Case Management Assessment            Discharge Note                    Date / Time of Note: 9/1/2021 9:21 AM                  Discharge Note Completed by: EVERARDO Grimm    Patient Name: Niko Kyle   YOB: 1970  Diagnosis: Chest pain [R07.9]  Chest pain, unspecified type [R07.9]   Date / Time: 8/30/2021  2:38 PM    [facility] No    Hospitalization in the last 30 days: No    Advance Directives:  Code Status: Full Code  [place] DNR form completed and on chart: No    Financial:  Payor: Kelly Rogers / Plan: Porsche Catherine / Product Type: *No Product type* /      Pharmacy:    [facility] [address] [place] [address]  Phone: [phone] Fax: [phone]      Assistance purchasing medications?: Potential Assistance Purchasing Medications: No  Assistance provided by Case Management: None at this time    Does patient want to participate in local refill/ meds to beds program?: No    Meds To Beds General Rules:  1. Can ONLY be done Monday- Friday between 8:30am-5pm  2. Prescription(s) must be in pharmacy by 3pm to be filled same day  3. Copy of patient's insurance/ prescription drug card and patient face sheet must be sent along with the prescription(s)  4. Cost of Rx cannot be added to hospital bill. If financial assistance is needed, please contact unit  or ;  or  CANNOT provide pharmacy voucher for patients co-pays  5.  Patients can then  the prescription on their way out of the hospital at discharge, or pharmacy can deliver to the bedside if staff is available. (payment due at time of pick-up or delivery - cash, check, or card accepted)     Able to afford home medications/ co-pay costs: Yes    ADLS:  Current PT AM-PAC Score: 20 /24  Current OT AM-PAC Score: 22 /24      DISCHARGE Disposition: Home- No Services Needed    LOC at discharge: Not Applicable  LESLI Completed: No    Notification completed in HENS/PAS?:  Not Applicable    IMM Completed:   No    Transportation:  Transportation PLAN for discharge: family   Mode of Transport: Private Car  Reason for medical transport: Not Applicable  Name of [address]: Not Applicable  Time of Transport: afternoon    Transport form completed: No    Home Care:  [address] ordered at discharge: No  [address]: Not Applicable  Orders faxed: No    Durable Medical Equipment:  DME Provider: none  Equipment obtained during hospitalization: none    Home Oxygen and Respiratory Equipment:  Oxygen needed at discharge?: No  [address]: Not Applicable  Portable tank available for discharge?: No    Dialysis:  Dialysis patient: No    Dialysis Center:  Not Applicable    Hospice Services:  Location: Not Applicable  Agency: Not Applicable    Consents signed: No    Referrals made at [facility] for outpatient continued care:  Not Applicable    Additional CM Notes:  Patient is from home with his wife. He is independent with a cane at baseline. He reports that he has a shower chair and a walker at home as well. He will discharge home with no needs today. The Plan for Transition of Care is related to the following treatment goals of Chest pain [R07.9]  Chest pain, unspecified type [R07.9]    The Patient and/or patient representative Lydia Hodges and his family were provided with a choice of provider and agrees with the discharge plan Yes    Freedom of choice list was provided with basic dialogue that supports the patient's individualized plan of care/goals and shares the quality data associated with the providers.  Yes    Care Transitions patient: No    Paris Barcenas  Case Management Department  Ph: [phone]  Fax: [phone]

## 2021-09-01 NOTE — DISCHARGE SUMMARY
Hospital Medicine Discharge Summary      Patient ID: Lizeth Carter      Patient's PCP: JULIA Olivarez CNP    Admit Date: 8/30/2021     Discharge Date: 9/1/2021      Admitting Physician: Hardik Boogie MD    Discharge Physician: Zahira Galindo MD     Discharge Diagnoses: Active Hospital Problems    Diagnosis Date Noted    Coronary artery disease of native artery with stable angina pectoris [facility]) [I25.118] 09/01/2021    Chest pain [R07.9] 08/30/2021         The patient was seen and examined on day of discharge and this discharge summary is in conjunction with any daily progress note from day of discharge. Hospital Course:     Patient is a 81-year-old male with medical history of hypertension and strokes in the past, who presented to the hospital for evaluation of exertional chest pain. Patient was evaluated for similar chest pain in June 2021, had negative stress test and chest CTA. Cardiology was consulted on this admission. Troponin has been negative serially. Patient underwent cardiac catheterization on 8/31/2021. Left anterior descending artery had 40% stenosis without need for intervention with stent. Cardiology adjusted medications-increased dose of statin and change verapamil to metoprolol. Patient had resolution of his chest symptoms. Discharged in stable condition with close cardiology follow-up. Consults:     IP CONSULT TO CARDIOLOGY  IP CONSULT TO HOSPITALIST  IP CONSULT TO CASE MANAGEMENT    Disposition:  Home     Discharged Condition: Stable    Code Status: Prior    Activity: activity as tolerated    Diet: cardiac diet    Follow Up: Primary Care Physician in one week    Exam:     General appearance: No apparent distress, appears stated age and cooperative. Lungs: Clear to ascultation, bilaterally without Rales/Wheezes/Rhonchi with good respiratory effort.   Heart: Regular rate and rhythm with Normal S1/S2 without  murmurs, rubs or gallops, point of maximum impulse non-displaced  Abdomen: Soft, non-tender or non-distended without rigidity or guarding and positive bowel sounds all four quadrants. Extremities: No clubbing, cyanosis, or edema bilaterally. Skin: Skin color, texture, turgor normal.    Neurologic: Alert and oriented X 3,   grossly non-focal.  Mental status: Alert, oriented, thought content appropriate      Labs: For convenience and continuity at follow-up the following most recent labs are provided:    CBC:   Lab Results   Component Value Date    WBC 6.6 09/01/2021    HGB 13.7 09/01/2021    HCT 39.2 09/01/2021     09/01/2021       RENAL:   Lab Results   Component Value Date     09/01/2021    K 4.3 09/01/2021     09/01/2021    CO2 25 09/01/2021    BUN 13 09/01/2021    CREATININE 0.9 09/01/2021           Discharge Medications:   Discharge Medication List as of 9/1/2021  9:44 AM           Details   nitroGLYCERIN (NITROSTAT) 0.4 MG SL tablet up to max of 3 total doses. If no relief after 1 dose, call 911., Disp-25 tablet, R-3Print      metoprolol succinate (TOPROL XL) 50 MG extended release tablet Take 1 tablet by mouth daily, Disp-30 tablet, R-3Print              Details   atorvastatin (LIPITOR) 80 MG tablet Take 1 tablet by mouth nightly, Disp-30 tablet, R-3Print              Details   DULoxetine (CYMBALTA) 60 MG extended release capsule Take 60 mg by mouth dailyHistorical Med      gabapentin (NEURONTIN) 400 MG capsule Take 400 mg by mouth 3 times daily. Historical Med      lisinopril (PRINIVIL;ZESTRIL) 20 MG tablet Take 30 mg by mouth dailyHistorical Med      omeprazole (PRILOSEC) 20 MG delayed release capsule Take 40 mg by mouth dailyHistorical Med      aspirin 81 MG chewable tablet Take 81 mg by mouth dailyHistorical Med                Time Spent on discharge is more than 30 minutes in the examination, evaluation, counseling and review of medications and discharge plan.       Signed:  Gaudencio Krishnan MD   9/1/2021      Thank you Franco Kim APRN - CNP for the opportunity to be involved in this patient's care. If you have any questions or concerns please feel free to contact me at [phone].

## 2021-09-01 NOTE — PLAN OF CARE
Problem: Pain:  Goal: Pain level will decrease  Description: Pain level will decrease  9/1/2021 0140 by Sury Hope RN  Note: Pt had no complaints of pain this shift. Problem: Safety:  Goal: Free from accidental physical injury  Description: Free from accidental physical injury  9/1/2021 0140 by Sury Hope RN  Note: Pt calling out appropriately to ambulate in room. Utilizes cane to help with left sided weakness. Jerome Fall Score and ABCDS Injury Risk assessments. Explained fall risk precautions to pt and family and rationale behind their use to keep the patient safe. Pt bed is in low position, side rails up, call light and belongings are in reach. Fall wristband applied and present on pts wrist.  Bed alarm on. Pt encouraged to call for assistance. Will continue with hourly rounds for PO intake, pain needs, toileting and repositioning as needed. Problem: Falls - Risk of:  Goal: Will remain free from falls  Description: Will remain free from falls  9/1/2021 0140 by Sury Hope RN  Note: Pt is a High fall risk r/t left sided weakness and use of ambulatory aid. See  For patient safety, visual surveillance in place to more closely monitor the patients safety and condition.

## 2021-09-08 ENCOUNTER — OFFICE VISIT (OUTPATIENT)
Dept: CARDIOLOGY CLINIC | Age: 51
End: 2021-09-08
Payer: MEDICARE

## 2021-09-08 VITALS
HEART RATE: 82 BPM | BODY MASS INDEX: 31.49 KG/M2 | WEIGHT: 207.8 LBS | HEIGHT: 68 IN | DIASTOLIC BLOOD PRESSURE: 90 MMHG | OXYGEN SATURATION: 96 % | SYSTOLIC BLOOD PRESSURE: 130 MMHG

## 2021-09-08 DIAGNOSIS — I25.118 CORONARY ARTERY DISEASE OF NATIVE ARTERY OF NATIVE HEART WITH STABLE ANGINA PECTORIS (HCC): Primary | ICD-10-CM

## 2021-09-08 DIAGNOSIS — E78.2 MIXED HYPERLIPIDEMIA: ICD-10-CM

## 2021-09-08 DIAGNOSIS — I10 ESSENTIAL HYPERTENSION: ICD-10-CM

## 2021-09-08 PROCEDURE — 99214 OFFICE O/P EST MOD 30 MIN: CPT | Performed by: NURSE PRACTITIONER

## 2021-09-08 PROCEDURE — G8417 CALC BMI ABV UP PARAM F/U: HCPCS | Performed by: NURSE PRACTITIONER

## 2021-09-08 PROCEDURE — G8427 DOCREV CUR MEDS BY ELIG CLIN: HCPCS | Performed by: NURSE PRACTITIONER

## 2021-09-08 PROCEDURE — 1036F TOBACCO NON-USER: CPT | Performed by: NURSE PRACTITIONER

## 2021-09-08 PROCEDURE — 3017F COLORECTAL CA SCREEN DOC REV: CPT | Performed by: NURSE PRACTITIONER

## 2021-09-08 RX ORDER — LISINOPRIL 40 MG/1
40 TABLET ORAL DAILY
Qty: 90 TABLET | Refills: 3 | Status: SHIPPED | OUTPATIENT
Start: 2021-09-08 | End: 2021-11-23 | Stop reason: SDUPTHER

## 2021-09-08 NOTE — PROGRESS NOTES
Wt 207 lb 12.8 oz (94.3 kg)   SpO2 96%   BMI 31.60 kg/m²    General (appearance):  No acute distress  Eyes: anicteric   Neck: soft, No JVD  Ears/Nose/Mouth/Thorat: No cyanosis  CV: RRR   Respiratory:  Clear, normal effort  GI: soft, non-tender, non-distended  Skin: Warm, dry. No rashes  Neuro/Psych: Alert and oriented x 3. Appropriate behavior  Ext:  No c/c. no edema  Pulses:  2+ right radial. Right wrist soft, no hematoma. Good cap refill. Weight  Wt Readings from Last 3 Encounters:   21 207 lb 12.8 oz (94.3 kg)   21 198 lb 3.2 oz (89.9 kg)   06/15/21 200 lb (90.7 kg)          CBC:   Lab Results   Component Value Date    WBC 6.6 2021    HGB 13.7 2021    HCT 39.2 (L) 2021    MCV 89.1 2021     2021     BMP:  Lab Results   Component Value Date    CREATININE 0.9 2021    BUN 13 2021     2021    K 4.3 2021     2021    CO2 25 2021     Mag:   Lab Results   Component Value Date    MG 2.10 2021     LIVER PROFILE:   Lab Results   Component Value Date    ALT 15 2021    AST 12 (L) 2021    ALKPHOS 90 2021    BILITOT 0.4 2021     PT/INR: No results found for: INR, PROTIME  Pro-BNP   Lab Results   Component Value Date    PROBNP 7 2021     LIPIDS:  No components found for: CHLPL  Lab Results   Component Value Date    TRIG 148 2021     Lab Results   Component Value Date    HDL 37 (L) 2021     Lab Results   Component Value Date    LDLCALC 83 2021     Lab Results   Component Value Date    LABVLDL 30 2021     TSH:No results found for: TSH, G4VAWGM, C9WKMBS, THYROIDAB    IMAGIN2021 Coronary angiogram  Left ventricular pressure 12 mmHg  Aortic pressure 140 mmHg  Coronary anatomy:   The left main coronary artery is normal.   Left anterior descending artery has mid vessel atherosclerotic plaque but no significant stenosis. Probably 40%.   Circumflex artery is normal.  The right coronary artery is a dominant vessel and has proximal and mid vessel atherosclerotic plaquing without significant stenoses. Left ventriculogram shows ejection fraction of 60 %. Normal wall motion. 8/30/2021 ECG: Sinus, with inferior Q waves    6/16/2021 Nuc stress:     Venu Hernandez is normal isotope uptake at stress and rest. There is no evidence of    myocardial ischemia or scar.    Normal LV size and systolic function.    Left ventricular ejection fraction of 72 %.    There are no regional wall motion abnormalities.    Overall findings represent a low risk scan.       6/2021 CTA PE  No evidence of pulmonary embolism to the segmental level. Medications:   Current Outpatient Medications   Medication Sig Dispense Refill    nitroGLYCERIN (NITROSTAT) 0.4 MG SL tablet up to max of 3 total doses. If no relief after 1 dose, call 911. 25 tablet 3    atorvastatin (LIPITOR) 80 MG tablet Take 1 tablet by mouth nightly 30 tablet 3    metoprolol succinate (TOPROL XL) 50 MG extended release tablet Take 1 tablet by mouth daily 30 tablet 3    DULoxetine (CYMBALTA) 60 MG extended release capsule Take 60 mg by mouth daily      gabapentin (NEURONTIN) 400 MG capsule Take 400 mg by mouth 3 times daily.  lisinopril (PRINIVIL;ZESTRIL) 20 MG tablet Take 30 mg by mouth daily      omeprazole (PRILOSEC) 20 MG delayed release capsule Take 40 mg by mouth daily      aspirin 81 MG chewable tablet Take 81 mg by mouth daily       No current facility-administered medications for this visit. Assessment:  1. Coronary artery disease of native artery of native heart with stable angina pectoris ([address]    2. Essential hypertension    3. Mixed hyperlipidemia          Plan:  CAD; acute issue   No obstructive CAD on [facility]   Lipitor. Check Lipids/LFT in 6-8 weeks   Lisinopril increased to 40 mg po daily   BMP in 1 week   Cont Toprol   Cont asa  HTN; uncontrolled.     /90   Increase lisinopril to 40 mg po daily   BMP in 1 week    HLD; uncontrolled.     Lipitor increased to 80 mg on discharge   Lipid/LFT in 6-8 weeks    Follow up with me in 1 month and Dr Claudio Flores in 3 months       Reviewed most recent: CBC, BMP, LFT, Lipids, Mag,BNP  Reviewed most recent: ECG, Nuc stress test,  CTA, LHC

## 2021-10-15 ENCOUNTER — CLINICAL DOCUMENTATION (OUTPATIENT)
Dept: OTHER | Age: 51
End: 2021-10-15

## 2021-10-19 ENCOUNTER — OFFICE VISIT (OUTPATIENT)
Dept: CARDIOLOGY CLINIC | Age: 51
End: 2021-10-19
Payer: MEDICARE

## 2021-10-19 VITALS
BODY MASS INDEX: 31.67 KG/M2 | HEIGHT: 68 IN | WEIGHT: 209 LBS | HEART RATE: 95 BPM | DIASTOLIC BLOOD PRESSURE: 96 MMHG | SYSTOLIC BLOOD PRESSURE: 140 MMHG | OXYGEN SATURATION: 98 %

## 2021-10-19 DIAGNOSIS — I10 ESSENTIAL HYPERTENSION: Primary | ICD-10-CM

## 2021-10-19 DIAGNOSIS — I25.118 CORONARY ARTERY DISEASE OF NATIVE ARTERY OF NATIVE HEART WITH STABLE ANGINA PECTORIS (HCC): ICD-10-CM

## 2021-10-19 DIAGNOSIS — E78.2 MIXED HYPERLIPIDEMIA: ICD-10-CM

## 2021-10-19 PROCEDURE — 3017F COLORECTAL CA SCREEN DOC REV: CPT | Performed by: NURSE PRACTITIONER

## 2021-10-19 PROCEDURE — G8417 CALC BMI ABV UP PARAM F/U: HCPCS | Performed by: NURSE PRACTITIONER

## 2021-10-19 PROCEDURE — G8484 FLU IMMUNIZE NO ADMIN: HCPCS | Performed by: NURSE PRACTITIONER

## 2021-10-19 PROCEDURE — 1036F TOBACCO NON-USER: CPT | Performed by: NURSE PRACTITIONER

## 2021-10-19 PROCEDURE — G8427 DOCREV CUR MEDS BY ELIG CLIN: HCPCS | Performed by: NURSE PRACTITIONER

## 2021-10-19 PROCEDURE — 99214 OFFICE O/P EST MOD 30 MIN: CPT | Performed by: NURSE PRACTITIONER

## 2021-10-19 RX ORDER — AMLODIPINE BESYLATE 5 MG/1
5 TABLET ORAL DAILY
Qty: 90 TABLET | Refills: 3 | Status: SHIPPED | OUTPATIENT
Start: 2021-10-19 | End: 2021-11-22

## 2021-10-19 NOTE — PROGRESS NOTES
hospital follow up     HPI:  49 y.o. patient of Dr Mcnamara Fernie here for non-obstructive CAD,  HTN, HLD and hx CVA. Last visit lisinopril and lipitor increased. BP remains elevated 140/90's. He states, \"I have minimal chest discomfort which is much better than the chest pain prior to hospitalization\". He tires quickly. No SOB, LH/dizziness, palpitations or syncope. No LE edema, orthopnea or PND. No fever, chills, n/v/d or GI/ bleeding. Past Medical History:   Diagnosis Date    Hyperlipidemia     Hypertension     Stroke ([facility] [address]     2012     No past surgical history on file. Family History   Problem Relation Age of Onset    Heart Attack Mother     Cancer Father      Social History     Tobacco Use    Smoking status: Never Smoker    Smokeless tobacco: Never Used   Substance Use Topics    Alcohol use: Never    Drug use: Never     Allergies:Codeine    Review of Systems  General: No changes in weight,  or night sweats. HEENT: No blurry or decreased vision. No changes in hearing, nasal discharge or sore throat. Cardiovascular:  See HPI. Respiratory: No cough, hemoptysis, or wheezing. Gastrointestinal:  No abdominal pain, hematochezia, melana, constipation, diarrhea, or history of GI ulcers. Genito-Urinary: No dysuria or hematuria. No urgency or polyuria. Musculoskeletal:  No complaints of joint pain, joint swelling or muscular weakness/soreness. Neurological:  No dizziness, headaches, numbness/tingling, speech problems or weakness. Psychological:  No anxiety or depression. Hematological and Lymphatic: No abnormal bleeding or bruising, blood clots, jaundice or swollen lymph nodes. Endocrine:   No malaise/lethargy, palpitations, polydipsia/polyuria, temperature intolerance or unexpected weight changes  Skin:  No rashes or non-healing ulcers.     Physical Exam:  BP (!) 140/96   Pulse 95   Ht 5' 8\" (1.727 m)   Wt 209 lb (94.8 kg)   SpO2 98%   BMI 31.78 kg/m²    General (appearance):  No acute distress  Eyes: anicteric   Neck: soft, No JVD  Ears/Nose/Mouth/Thorat: No cyanosis  CV: RRR   Respiratory:  Clear, normal effort  GI: soft, non-tender, non-distended  Skin: Warm, dry. No rashes  Neuro/Psych: Alert and oriented x 3. Appropriate behavior  Ext:  No c/c. no edema  Pulses:  2+carotid     Weight  Wt Readings from Last 3 Encounters:   10/19/21 209 lb (94.8 kg)   21 207 lb 12.8 oz (94.3 kg)   21 198 lb 3.2 oz (89.9 kg)          CBC:   Lab Results   Component Value Date    WBC 6.6 2021    HGB 13.7 2021    HCT 39.2 (L) 2021    MCV 89.1 2021     2021     BMP:  Lab Results   Component Value Date    CREATININE 0.9 09/15/2021    BUN 10 09/15/2021     09/15/2021    K 4.1 09/15/2021     09/15/2021    CO2 24 09/15/2021     Mag:   Lab Results   Component Value Date    MG 2.10 2021     LIVER PROFILE:   Lab Results   Component Value Date    ALT 15 2021    AST 12 (L) 2021    ALKPHOS 90 2021    BILITOT 0.4 2021     PT/INR: No results found for: INR, PROTIME  Pro-BNP   Lab Results   Component Value Date    PROBNP 7 2021     LIPIDS:  No components found for: CHLPL  Lab Results   Component Value Date    TRIG 148 2021     Lab Results   Component Value Date    HDL 37 (L) 2021     Lab Results   Component Value Date    LDLCALC 83 2021     Lab Results   Component Value Date    LABVLDL 30 2021     TSH:No results found for: TSH, T0TFCBW, P7YCIMO, THYROIDAB    IMAGIN2021 Coronary angiogram  Left ventricular pressure 12 mmHg  Aortic pressure 140 mmHg  Coronary anatomy:   The left main coronary artery is normal.   Left anterior descending artery has mid vessel atherosclerotic plaque but no significant stenosis. Probably 40%. Circumflex artery is normal.  The right coronary artery is a dominant vessel and has proximal and mid vessel atherosclerotic plaquing without significant stenoses.   Left ventriculogram shows ejection fraction of 60 %. Normal wall motion. 8/30/2021 ECG: Sinus, with inferior Q waves    6/16/2021 Nuc stress:     Mariely Ward is normal isotope uptake at stress and rest. There is no evidence of    myocardial ischemia or scar.    Normal LV size and systolic function.    Left ventricular ejection fraction of 72 %.    There are no regional wall motion abnormalities.    Overall findings represent a low risk scan.       6/2021 CTA PE  No evidence of pulmonary embolism to the segmental level. Medications:   Current Outpatient Medications   Medication Sig Dispense Refill    Cholecalciferol 50 MCG (2000 UT) CAPS Vitamin D3 50 mcg (2,000 unit) capsule   Take 2 capsules every day by oral route.  Cyanocobalamin (VITAMIN B 12) 100 MCG LOZG Take by mouth      lisinopril (PRINIVIL;ZESTRIL) 40 MG tablet Take 1 tablet by mouth daily 90 tablet 3    nitroGLYCERIN (NITROSTAT) 0.4 MG SL tablet up to max of 3 total doses. If no relief after 1 dose, call 911. 25 tablet 3    atorvastatin (LIPITOR) 80 MG tablet Take 1 tablet by mouth nightly 30 tablet 3    metoprolol succinate (TOPROL XL) 50 MG extended release tablet Take 1 tablet by mouth daily 30 tablet 3    DULoxetine (CYMBALTA) 60 MG extended release capsule Take 60 mg by mouth daily      gabapentin (NEURONTIN) 400 MG capsule Take 400 mg by mouth 3 times daily.  omeprazole (PRILOSEC) 20 MG delayed release capsule Take 40 mg by mouth daily      aspirin 81 MG chewable tablet Take 81 mg by mouth daily       No current facility-administered medications for this visit. Assessment:  1. Essential hypertension    2. Coronary artery disease of native artery of native heart with stable angina pectoris ([facility] [address]    3.  Mixed hyperlipidemia            Plan:  CAD; stable   No obstructive CAD on [facility]   Lipitor 80 mg   Lisinopril 40 mg po daily   Toprol 50 mg (try taking at night d/t fatigue)   Cont asa   IF continues to have chest discomfort consider Imdur   HTN; uncontrolled. /90's   Start amlodipine 5 mg po daily    Lisinopril, Toprol     HLD;stable   Lipitor 80 mg    Follow up with Dr Reyes Ortega in December.  Monitor BP at home and come back in sooner if BP > 135/90      Reviewed most recent: CBC, BMP, LFT, Lipids, Mag,BNP  Reviewed most recent: ECG, Nuc stress test,  CTA, LHC

## 2021-10-27 ENCOUNTER — TELEPHONE (OUTPATIENT)
Dept: CARDIOLOGY CLINIC | Age: 51
End: 2021-10-27

## 2021-10-27 NOTE — TELEPHONE ENCOUNTER
Kristi Quinonez said have pt double his Norvasc to 10mg QD and monitor his bp for a week. Pt will call back in a week to let us know how his bp is doing.

## 2021-10-27 NOTE — TELEPHONE ENCOUNTER
Patient states he seen Richmond Sung on 10.19 and patient was put on Amlodipine and patient state his b.p has been 145/165 or 101/115 bottom number has not been under 100. please call [phone]

## 2021-10-27 NOTE — TELEPHONE ENCOUNTER
These blood pressures are not possible. Diastolic number can't be higher than systolic. Recommend pt get a new machine and/or come in for BP check  and bring the monitor.

## 2021-11-03 RX ORDER — METOPROLOL SUCCINATE 50 MG/1
50 TABLET, EXTENDED RELEASE ORAL DAILY
Qty: 90 TABLET | Refills: 3 | Status: SHIPPED | OUTPATIENT
Start: 2021-11-03 | End: 2021-12-08

## 2021-11-03 RX ORDER — ATORVASTATIN CALCIUM 80 MG/1
80 TABLET, FILM COATED ORAL NIGHTLY
Qty: 90 TABLET | Refills: 3 | Status: SHIPPED | OUTPATIENT
Start: 2021-11-03

## 2021-11-03 NOTE — TELEPHONE ENCOUNTER
Patient needs refills:     metoprolol succinate (TOPROL XL) 50 MG extended release tablet  Take 1 tablet by mouth daily, Disp-30 tablet, R-3  Print Last Dose: Not Recorded      atorvastatin (LIPITOR) 80 MG tablet  Take 1 tablet by mouth nightly, Disp-30 tablet, R-3  Print Last Dose: Not Recorded  Refills:3 ordered [place] [address], [place] [phone] Ras De Oliveira [phone]    Last visit: 10/19/21  Next visit: 12/8/21  Last labs: 9/15/21  Last filled: 9/1/21

## 2021-11-15 ENCOUNTER — CLINICAL DOCUMENTATION (OUTPATIENT)
Dept: OTHER | Age: 51
End: 2021-11-15

## 2021-11-22 NOTE — TELEPHONE ENCOUNTER
amLODIPine (NORVASC) 5 MG tablet  Take 1 tablet by mouth daily, Disp-90 tablet, R-3  Normal Last Dose: Not Recorded  Refills:3 ordered [facility] [address] [phone] - F [phone]    lisinopril (PRINIVIL;ZESTRIL) 40 MG tablet  Take 1 tablet by mouth daily, Disp-90 tablet, R-3  Pharmacy Mail [address]               Patient states cece wants to up the dosage of amlodipine to 10 mg. Patient is asking to have Amlodipine  refilled at 10mg.   Please call patient to clarify #  [phone]    Please see phone Encounter on 10-27-21

## 2021-11-23 RX ORDER — AMLODIPINE BESYLATE 5 MG/1
10 TABLET ORAL DAILY
Qty: 90 TABLET | Refills: 3 | Status: SHIPPED | OUTPATIENT
Start: 2021-11-23

## 2021-11-23 RX ORDER — LISINOPRIL 40 MG/1
40 TABLET ORAL DAILY
Qty: 90 TABLET | Refills: 3 | Status: SHIPPED | OUTPATIENT
Start: 2021-11-23 | End: 2021-11-23 | Stop reason: SDUPTHER

## 2021-11-23 RX ORDER — AMLODIPINE BESYLATE 5 MG/1
10 TABLET ORAL DAILY
Qty: 90 TABLET | Refills: 3 | Status: SHIPPED | OUTPATIENT
Start: 2021-11-23 | End: 2021-11-23 | Stop reason: SDUPTHER

## 2021-11-23 RX ORDER — LISINOPRIL 40 MG/1
40 TABLET ORAL DAILY
Qty: 90 TABLET | Refills: 3 | Status: SHIPPED | OUTPATIENT
Start: 2021-11-23

## 2021-12-02 ENCOUNTER — TELEPHONE (OUTPATIENT)
Dept: CARDIOLOGY CLINIC | Age: 51
End: 2021-12-02

## 2021-12-02 NOTE — TELEPHONE ENCOUNTER
Requested Prescriptions     Pending Prescriptions Disp Refills    nitroGLYCERIN (NITROSTAT) 0.4 MG SL tablet 25 tablet 3     Sig: up to max of 3 total doses. If no relief after 1 dose, call 911.           Number: 25    Refills: 3    Last Office Visit: 10/19/2021     Next Office Visit: 12/8/2021

## 2021-12-03 RX ORDER — NITROGLYCERIN 0.4 MG/1
TABLET SUBLINGUAL
Qty: 25 TABLET | Refills: 3 | Status: SHIPPED | OUTPATIENT
Start: 2021-12-03

## 2021-12-08 ENCOUNTER — OFFICE VISIT (OUTPATIENT)
Dept: CARDIOLOGY CLINIC | Age: 51
End: 2021-12-08
Payer: MEDICARE

## 2021-12-08 VITALS
HEIGHT: 68 IN | DIASTOLIC BLOOD PRESSURE: 86 MMHG | HEART RATE: 93 BPM | WEIGHT: 216.4 LBS | BODY MASS INDEX: 32.8 KG/M2 | SYSTOLIC BLOOD PRESSURE: 136 MMHG | OXYGEN SATURATION: 98 %

## 2021-12-08 DIAGNOSIS — I25.118 CORONARY ARTERY DISEASE OF NATIVE ARTERY OF NATIVE HEART WITH STABLE ANGINA PECTORIS (HCC): Primary | ICD-10-CM

## 2021-12-08 DIAGNOSIS — E78.2 MIXED HYPERLIPIDEMIA: ICD-10-CM

## 2021-12-08 DIAGNOSIS — I10 ESSENTIAL HYPERTENSION: ICD-10-CM

## 2021-12-08 PROCEDURE — G8427 DOCREV CUR MEDS BY ELIG CLIN: HCPCS | Performed by: INTERNAL MEDICINE

## 2021-12-08 PROCEDURE — 1036F TOBACCO NON-USER: CPT | Performed by: INTERNAL MEDICINE

## 2021-12-08 PROCEDURE — 3017F COLORECTAL CA SCREEN DOC REV: CPT | Performed by: INTERNAL MEDICINE

## 2021-12-08 PROCEDURE — G8484 FLU IMMUNIZE NO ADMIN: HCPCS | Performed by: INTERNAL MEDICINE

## 2021-12-08 PROCEDURE — G8417 CALC BMI ABV UP PARAM F/U: HCPCS | Performed by: INTERNAL MEDICINE

## 2021-12-08 PROCEDURE — 99214 OFFICE O/P EST MOD 30 MIN: CPT | Performed by: INTERNAL MEDICINE

## 2021-12-08 RX ORDER — METOPROLOL SUCCINATE 100 MG/1
100 TABLET, EXTENDED RELEASE ORAL DAILY
Qty: 90 TABLET | Refills: 3 | Status: SHIPPED | OUTPATIENT
Start: 2021-12-08

## 2021-12-08 NOTE — PROGRESS NOTES
49 y.o. here for f/u for HTN, h/o stroke, cad. No cp. No sob. No n/v/lh/dizziness. NO syncope. No LE edema. No orthopnea or PND. Past Medical History:   Diagnosis Date    Hyperlipidemia     Hypertension     Stroke ([facility] [address]     2012     No past surgical history on file. Social History     Tobacco Use    Smoking status: Never Smoker    Smokeless tobacco: Never Used   Substance Use Topics    Alcohol use: Never    Drug use: Never     Allergies   Allergen Reactions    Codeine      Family History   Problem Relation Age of Onset    Heart Attack Mother     Cancer Father      Review of Systems   General: No fevers, chills, fatigue, or night sweats. No abnormal changes in weight. HEENT: No blurry or decreased vision. No changes in hearing, nasal discharge or sore throat. Cardiovascular: See HPI. No cramping in legs or buttocks when walking. Respiratory: No cough, hemoptysis, or wheezing. No history of asthma. Gastrointestinal: No abdominal pain, hematochezia, melana, or history of GI ulcers. Genito-Urinary: No dysuria or hematuria. No urgency or polyuria. Musculoskeletal: No complaints of joint pain, joint swelling or muscular weakness/soreness. Neurological: No dizziness or headaches. No numbness/tingling, speech problems or weakness. No history of a stroke or TIA. Psychological: No anxiety or depression  Hematological and Lymphatic: No abnormal bleeding or bruising, blood clots, jaundice. Endocrine: No malaise/lethargy, palpitations, polydipsia/polyuria, temperature intolerance or unexpected weight changes. Skin: No rashes or non-healing ulcers. PE:  Blood pressure 136/86, pulse 93, height 5' 8\" (1.727 m), weight 216 lb 6.4 oz (98.2 kg), SpO2 98 %. General (appearance): Well devel. No distress  Eyes: Anicteric. EOMI. Neck: Supple. No JVD  Ears/Nose/Mouth/Thorat: No cyanosis  CV: RRR. NO m/r/g  Respiratory:  Clear, normal respiratory effort  GI: abd s/nt/nd  Skin: Warm, dry.  No rashmary  Neuro/Psych: Alert and oriented x 3. Appropriate behavior  Ext:  No c/c. No edema  Pulses:  2+ R radial    Lab Results   Component Value Date    WBC 6.6 09/01/2021    HGB 13.7 09/01/2021    HCT 39.2 (L) 09/01/2021    MCV 89.1 09/01/2021     09/01/2021     Lab Results   Component Value Date     09/15/2021    K 4.1 09/15/2021     09/15/2021    CO2 24 09/15/2021    BUN 10 09/15/2021    CREATININE 0.9 09/15/2021    GLUCOSE 134 (H) 09/15/2021    CALCIUM 9.2 09/15/2021    PROT 7.1 08/30/2021    LABALBU 4.2 08/30/2021    BILITOT 0.4 08/30/2021    ALKPHOS 90 08/30/2021    AST 12 (L) 08/30/2021    ALT 15 08/30/2021    LABGLOM >60 09/15/2021    GFRAA >60 09/15/2021     No results found for: INR, PROTIME    Lab Results   Component Value Date    CHOL 150 08/31/2021     Lab Results   Component Value Date    TRIG 148 08/31/2021     Lab Results   Component Value Date    HDL 37 (L) 08/31/2021     Lab Results   Component Value Date    LDLCALC 83 08/31/2021     Lab Results   Component Value Date    LABVLDL 30 08/31/2021     No results found for: CHOLHDLRATIO    8/2021 LHC (Toni Combsing):  LMCA normal  LAD: 40% stenosis  LCx: Normal  RCA: no sig stenosis    ECG: NSR with inferior Q waves     6/2021 Nuc Stress: No ischemia. EF normal     6/2021 PE CT: No PE.       Current Outpatient Medications:     nitroGLYCERIN (NITROSTAT) 0.4 MG SL tablet, up to max of 3 total doses.  If no relief after 1 dose, call 911., Disp: 25 tablet, Rfl: 3    lisinopril (PRINIVIL;ZESTRIL) 40 MG tablet, Take 1 tablet by mouth daily, Disp: 90 tablet, Rfl: 3    amLODIPine (NORVASC) 5 MG tablet, Take 2 tablets by mouth daily, Disp: 90 tablet, Rfl: 3    metoprolol succinate (TOPROL XL) 50 MG extended release tablet, Take 1 tablet by mouth daily, Disp: 90 tablet, Rfl: 3    atorvastatin (LIPITOR) 80 MG tablet, Take 1 tablet by mouth nightly, Disp: 90 tablet, Rfl: 3    Cholecalciferol 50 MCG (2000 UT) CAPS, Vitamin D3 50 mcg (2,000 unit) capsule  Take 2 capsules every day by oral route., Disp: , Rfl:     Cyanocobalamin (VITAMIN B 12) 100 MCG LOZG, Take by mouth, Disp: , Rfl:     DULoxetine (CYMBALTA) 60 MG extended release capsule, Take 60 mg by mouth daily, Disp: , Rfl:     gabapentin (NEURONTIN) 400 MG capsule, Take 400 mg by mouth 3 times daily. , Disp: , Rfl:     omeprazole (PRILOSEC) 20 MG delayed release capsule, Take 40 mg by mouth daily, Disp: , Rfl:     aspirin 81 MG chewable tablet, Take 81 mg by mouth daily, Disp: , Rfl:     A/P:  49 y.o. here for f/u on non-obstruc cad and htn. Issues:  1. Coronary artery disease of native artery of native heart with stable angina pectoris ([facility] [address]    2. Mixed hyperlipidemia    3. Essential hypertension      Recs:  -ASA 81 mg daily  -Lisinopril  -Toprol to 100 mg daily  -Atorvastatin   -Amlodipine  -F/U 6-8 mo    Porfirio Witt MD, [facility] [place], [place]

## 2021-12-29 ENCOUNTER — APPOINTMENT (OUTPATIENT)
Dept: CT IMAGING | Age: 51
End: 2021-12-29
Payer: MEDICARE

## 2021-12-29 ENCOUNTER — HOSPITAL ENCOUNTER (EMERGENCY)
Age: 51
Discharge: HOME | End: 2021-12-29
Attending: EMERGENCY MEDICINE
Payer: MEDICARE

## 2021-12-29 ENCOUNTER — APPOINTMENT (OUTPATIENT)
Dept: MRI IMAGING | Age: 51
End: 2021-12-29
Payer: MEDICARE

## 2021-12-29 VITALS
BODY MASS INDEX: 32.34 KG/M2 | RESPIRATION RATE: 16 BRPM | HEIGHT: 68 IN | OXYGEN SATURATION: 95 % | HEART RATE: 96 BPM | WEIGHT: 213.41 LBS | SYSTOLIC BLOOD PRESSURE: 145 MMHG | TEMPERATURE: 98 F | DIASTOLIC BLOOD PRESSURE: 93 MMHG

## 2021-12-29 DIAGNOSIS — R53.1 LEFT-SIDED WEAKNESS: Primary | ICD-10-CM

## 2021-12-29 PROBLEM — G43.409 HEMIPLEGIC MIGRAINE WITHOUT STATUS MIGRAINOSUS, NOT INTRACTABLE: Status: ACTIVE | Noted: 2021-12-29

## 2021-12-29 LAB
A/G RATIO: 1.7 (ref 1.1–2.2)
ALBUMIN SERPL-MCNC: 4.5 G/DL (ref 3.4–5)
ALP BLD-CCNC: 95 U/L (ref 40–129)
ALT SERPL-CCNC: 22 U/L (ref 10–40)
ANION GAP SERPL CALCULATED.3IONS-SCNC: 12 MMOL/L (ref 3–16)
AST SERPL-CCNC: 10 U/L (ref 15–37)
BASOPHILS ABSOLUTE: 0 K/UL (ref 0–0.2)
BASOPHILS RELATIVE PERCENT: 0.2 %
BILIRUB SERPL-MCNC: 0.4 MG/DL (ref 0–1)
BUN BLDV-MCNC: 19 MG/DL (ref 7–20)
CALCIUM SERPL-MCNC: 8.9 MG/DL (ref 8.3–10.6)
CHLORIDE BLD-SCNC: 102 MMOL/L (ref 99–110)
CO2: 25 MMOL/L (ref 21–32)
CREAT SERPL-MCNC: 1 MG/DL (ref 0.9–1.3)
EKG ATRIAL RATE: 100 BPM
EKG DIAGNOSIS: NORMAL
EKG P AXIS: 35 DEGREES
EKG P-R INTERVAL: 126 MS
EKG Q-T INTERVAL: 314 MS
EKG QRS DURATION: 74 MS
EKG QTC CALCULATION (BAZETT): 405 MS
EKG R AXIS: 2 DEGREES
EKG T AXIS: 18 DEGREES
EKG VENTRICULAR RATE: 100 BPM
EOSINOPHILS ABSOLUTE: 0.1 K/UL (ref 0–0.6)
EOSINOPHILS RELATIVE PERCENT: 1.5 %
GFR AFRICAN AMERICAN: >60
GFR NON-AFRICAN AMERICAN: >60
GLUCOSE BLD-MCNC: 121 MG/DL (ref 70–99)
GLUCOSE BLD-MCNC: 130 MG/DL (ref 70–99)
HCT VFR BLD CALC: 45.9 % (ref 40.5–52.5)
HEMOGLOBIN: 15.6 G/DL (ref 13.5–17.5)
LYMPHOCYTES ABSOLUTE: 1.5 K/UL (ref 1–5.1)
LYMPHOCYTES RELATIVE PERCENT: 15.4 %
MCH RBC QN AUTO: 30.7 PG (ref 26–34)
MCHC RBC AUTO-ENTMCNC: 34 G/DL (ref 31–36)
MCV RBC AUTO: 90.1 FL (ref 80–100)
MONOCYTES ABSOLUTE: 0.7 K/UL (ref 0–1.3)
MONOCYTES RELATIVE PERCENT: 7.2 %
NEUTROPHILS ABSOLUTE: 7.3 K/UL (ref 1.7–7.7)
NEUTROPHILS RELATIVE PERCENT: 75.7 %
PDW BLD-RTO: 14.1 % (ref 12.4–15.4)
PERFORMED ON: ABNORMAL
PLATELET # BLD: 244 K/UL (ref 135–450)
PMV BLD AUTO: 7.6 FL (ref 5–10.5)
POTASSIUM REFLEX MAGNESIUM: 4.2 MMOL/L (ref 3.5–5.1)
RBC # BLD: 5.1 M/UL (ref 4.2–5.9)
SODIUM BLD-SCNC: 139 MMOL/L (ref 136–145)
TOTAL PROTEIN: 7.1 G/DL (ref 6.4–8.2)
TROPONIN: <0.01 NG/ML
WBC # BLD: 9.6 K/UL (ref 4–11)

## 2021-12-29 PROCEDURE — 70450 CT HEAD/BRAIN W/O DYE: CPT

## 2021-12-29 PROCEDURE — 70498 CT ANGIOGRAPHY NECK: CPT

## 2021-12-29 PROCEDURE — 6360000002 HC RX W HCPCS: Performed by: EMERGENCY MEDICINE

## 2021-12-29 PROCEDURE — 96374 THER/PROPH/DIAG INJ IV PUSH: CPT

## 2021-12-29 PROCEDURE — 80053 COMPREHEN METABOLIC PANEL: CPT

## 2021-12-29 PROCEDURE — 2580000003 HC RX 258: Performed by: EMERGENCY MEDICINE

## 2021-12-29 PROCEDURE — 93005 ELECTROCARDIOGRAM TRACING: CPT | Performed by: EMERGENCY MEDICINE

## 2021-12-29 PROCEDURE — 99285 EMERGENCY DEPT VISIT HI MDM: CPT | Performed by: PSYCHIATRY & NEUROLOGY

## 2021-12-29 PROCEDURE — 84484 ASSAY OF TROPONIN QUANT: CPT

## 2021-12-29 PROCEDURE — 96361 HYDRATE IV INFUSION ADD-ON: CPT

## 2021-12-29 PROCEDURE — 36415 COLL VENOUS BLD VENIPUNCTURE: CPT

## 2021-12-29 PROCEDURE — 85025 COMPLETE CBC W/AUTO DIFF WBC: CPT

## 2021-12-29 PROCEDURE — 6360000004 HC RX CONTRAST MEDICATION: Performed by: EMERGENCY MEDICINE

## 2021-12-29 PROCEDURE — 70551 MRI BRAIN STEM W/O DYE: CPT

## 2021-12-29 PROCEDURE — 96375 TX/PRO/DX INJ NEW DRUG ADDON: CPT

## 2021-12-29 PROCEDURE — 99285 EMERGENCY DEPT VISIT HI MDM: CPT

## 2021-12-29 RX ORDER — 0.9 % SODIUM CHLORIDE 0.9 %
1000 INTRAVENOUS SOLUTION INTRAVENOUS ONCE
Status: COMPLETED | OUTPATIENT
Start: 2021-12-29 | End: 2021-12-29

## 2021-12-29 RX ORDER — PROCHLORPERAZINE EDISYLATE 5 MG/ML
10 INJECTION INTRAMUSCULAR; INTRAVENOUS ONCE
Status: COMPLETED | OUTPATIENT
Start: 2021-12-29 | End: 2021-12-29

## 2021-12-29 RX ORDER — KETOROLAC TROMETHAMINE 30 MG/ML
15 INJECTION, SOLUTION INTRAMUSCULAR; INTRAVENOUS ONCE
Status: COMPLETED | OUTPATIENT
Start: 2021-12-29 | End: 2021-12-29

## 2021-12-29 RX ADMIN — IOPAMIDOL 80 ML: 755 INJECTION, SOLUTION INTRAVENOUS at 13:42

## 2021-12-29 RX ADMIN — SODIUM CHLORIDE 1000 ML: 9 INJECTION, SOLUTION INTRAVENOUS at 16:20

## 2021-12-29 RX ADMIN — PROCHLORPERAZINE EDISYLATE 10 MG: 5 INJECTION INTRAMUSCULAR; INTRAVENOUS at 16:20

## 2021-12-29 RX ADMIN — KETOROLAC TROMETHAMINE 15 MG: 30 INJECTION, SOLUTION INTRAMUSCULAR; INTRAVENOUS at 16:20

## 2021-12-29 ASSESSMENT — PAIN DESCRIPTION - FREQUENCY: FREQUENCY: CONTINUOUS

## 2021-12-29 ASSESSMENT — ENCOUNTER SYMPTOMS
RESPIRATORY NEGATIVE: 1
VOMITING: 0
COUGH: 0
RHINORRHEA: 0
ABDOMINAL PAIN: 0
EYES NEGATIVE: 1
NAUSEA: 0
GASTROINTESTINAL NEGATIVE: 1
SHORTNESS OF BREATH: 0
SORE THROAT: 0

## 2021-12-29 ASSESSMENT — PAIN SCALES - GENERAL
PAINLEVEL_OUTOF10: 7
PAINLEVEL_OUTOF10: 6

## 2021-12-29 ASSESSMENT — PAIN DESCRIPTION - DESCRIPTORS: DESCRIPTORS: ACHING

## 2021-12-29 ASSESSMENT — PAIN DESCRIPTION - ORIENTATION: ORIENTATION: RIGHT

## 2021-12-29 ASSESSMENT — PAIN SCALES - WONG BAKER
WONGBAKER_NUMERICALRESPONSE: 4
WONGBAKER_NUMERICALRESPONSE: 8

## 2021-12-29 ASSESSMENT — PAIN DESCRIPTION - PAIN TYPE: TYPE: ACUTE PAIN

## 2021-12-29 ASSESSMENT — PAIN DESCRIPTION - LOCATION: LOCATION: HEAD

## 2021-12-29 NOTE — ED NOTES
Bed:  2[facility]  Expected date:   Expected time:   Means of arrival:   Comments:  Code stroke     Betsy Scott RN  12/29/21 3785

## 2021-12-29 NOTE — CONSULTS
Telemedicine Consult Note   Stroke Team                Patient Name: Cassi Bojorquez (14 y.o. male)  MRN: [id number]  : 1970  Admission Date: 2021   Current Date: 21    STROKE TIMELINE  Last Known to be Well (Stroke Diagnosis)  Date Last Known Well: 21  Time Last Known Well: 4778  ED arrival time: ED Arrival 1333    51M who presented with headache, facial droop and left sided weakness. Upon chart review, ED provider discovered that patient has had several presentations similar to this, sometimes even received tpa, and all resulted in negative imaging including negative MRIs. No tpa 2/2 low NIHSS. No thrombectomy 2/2 no LVO. Additional Recommendations:  - Close follow up with PCP to support patient's complaints       For questions, call the  Stroke Team at [phone].     Dr. Alisha Worley Stroke Team        Telemedicine Time: 14 minutes

## 2021-12-29 NOTE — CONSULTS
Neurology / Neurocritical Care Consult Note    Reason for Consult: left sided weakness  Admission Chief Complaint: left sided weakness and sensory change      HPI:     Mr. Jody Sam is a 49year old man with HLD, HTN, reported stroke who presented with left sided weakness and numbness. He's had episodes several times over the past few years and has received tPA multiple times. He occasionally has headaches with these episodes. He carries a diagnosis of conversion disorder for these repeated admissions for left sided weakness and sensory changes. PAST MEDICAL HISTORY:  Past Medical History:   Diagnosis Date    Hyperlipidemia     Hypertension     Stroke ([facility] [address]     2012       ALLERGIES:  Allergies   Allergen Reactions    Codeine        SURGICAL HISTORY:  History reviewed. No pertinent surgical history. FAMILY HISTORY:  Family history non-contributory  Family History   Problem Relation Age of Onset    Heart Attack Mother     Cancer Father        SOCIAL HISTORY:  Social History     Tobacco History     Smoking Status  Never Smoker    Smokeless Tobacco Use  Never Used          Alcohol History     Alcohol Use Status  Never          Drug Use     Drug Use Status  Never          Sexual Activity     Sexually Active  Not Asked                HOME MEDICATIONS:  No current facility-administered medications on file prior to encounter. Current Outpatient Medications on File Prior to Encounter   Medication Sig Dispense Refill    metoprolol succinate (TOPROL XL) 100 MG extended release tablet Take 1 tablet by mouth daily 90 tablet 3    nitroGLYCERIN (NITROSTAT) 0.4 MG SL tablet up to max of 3 total doses.  If no relief after 1 dose, call 911. 25 tablet 3    lisinopril (PRINIVIL;ZESTRIL) 40 MG tablet Take 1 tablet by mouth daily 90 tablet 3    amLODIPine (NORVASC) 5 MG tablet Take 2 tablets by mouth daily 90 tablet 3    atorvastatin (LIPITOR) 80 MG tablet Take 1 tablet by mouth nightly 90 tablet 3    Cholecalciferol 50 MCG (2000 UT) CAPS Vitamin D3 50 mcg (2,000 unit) capsule   Take 2 capsules every day by oral route.  Cyanocobalamin (VITAMIN B 12) 100 MCG LOZG Take by mouth      DULoxetine (CYMBALTA) 60 MG extended release capsule Take 60 mg by mouth daily      gabapentin (NEURONTIN) 400 MG capsule Take 400 mg by mouth 3 times daily.  omeprazole (PRILOSEC) 20 MG delayed release capsule Take 40 mg by mouth daily      aspirin 81 MG chewable tablet Take 81 mg by mouth daily           ROS:   Constitutional- No weight loss or fevers   Eyes- No diplopia. No photophobia. Ears/nose/throat- No dysphagia. No Dysarthria   Cardiovascular- No palpitations. No chest pain   Respiratory- No dyspnea. No Cough   Gastrointestinal- No Abdominal pain. No Vomiting. Genitourinary- No incontinence. No urinary retention   Musculoskeletal- No myalgia. No arthralgia   Skin- No rash. No easy bruising. Psychiatric- No depression. No anxiety   Endocrine- No diabetes. No thyroid issues. Hematologic- No bleeding difficulty. No fatigue   Neurologic- + weakness. No Headache. +numbness, +tingling      PHYSICAL EXAM:  BP (!) 146/102   Pulse 100   Temp 98 °F (36.7 °C) (Oral)   Resp 16   Ht 5' 8\" (1.727 m)   Wt 213 lb 6.5 oz (96.8 kg)   SpO2 97%   BMI 32.45 kg/m²     General Alert, no distress, well-nourished  Cardiovascular: Warm, appears well perfused   Respiratory: Easy, non-labored respiratory pattern   Abdominal: Abdomen is without distention   Extremities: Upper and lower extremities are atraumatic in appearance without deformity. No swelling or erythema.      Neurologic  Mental status:   orientation to person, place, time, situation   Attention intact as able to attend well to the exam     Language fluent in conversation   Comprehension intact; follows simple commands    Cranial nerves:   CN2: Visual fields full w/o extinction on confrontational testing   CN 3,4,6: Pupils equal and reactive to light, extraocular muscles intact  CN5: decreased sensation on left to light touch  CN7: Face symmetric bilaterally   CN8: Hearing symmetric to spoken voice  CN9: Palate elevated symmetrically  CN11: Traps full strength on shoulder shrug  CN12: Tongue midline with protrusion    Motor Exam:  Full strength on left with encouragement. Uses left arm more until being observed. R  L    Deltoid 5 5   Biceps 5 5   Triceps 5 5   Wrist extension  5 5   Interossei 5 5      R  L    Hip flexion  5 5   Knee flexion  5 5   Knee extension  5 5   Ankle dorsiflexion  5 5   Ankle plantar flexion  5 5       Sensory: decreased sensation in all modalities in left arm and leg  Cerebellar/coordination: finger nose finger normal without ataxia  Tone: normal in all 4 extremities      IMAGES:  Images personally reviewed and agree w/ radiology interpretation. Head CT w/o Contrast:  CT HEAD WO CONTRAST 12/29/2021    Narrative  EXAM: CT HEAD WO CONTRAST    INDICATION: L weakness    TECHNIQUE: Axial thin section CT images of the head were obtained without contrast. Sagittal and coronal 2-D multiplanar reconstructions were performed at the scanner. Up-to-date CT equipment and radiation dose reduction techniques were employed. COMPARISON: None available. FINDINGS:    The diagnostic quality of the examination is adequate. Brain parenchyma: Normal brain attenuation. Ventricles and extraaxial spaces: Normal ventricular system. No extra-axial fluid collection. Orbits, paranasal sinuses, mastoids: No acute orbital abnormality. Clear paranasal sinuses. Clear mastoid air cells. Extracranial soft tissues: Normal.    Calvarium and skull base: No acute calvarial abnormality. Other: No other abnormalities. Impression  1. No evidence of acute intracranial hemorrhage or mass effect.        CTA of Head / Neck w/ Contrast:  CTA HEAD NECK W CONTRAST 12/29/2021    Narrative  CT ANGIOGRAPHY HEAD AND NECK  Intracranial and extracranial vasculature    INDICATIONS: left sided weakness    Comparison with computed tomography, 12-    Multiplanar imaging image postprocessing. Volume rendering, Volume rendering, 3-D rendering image postprocessing at an independent workstation, 3D-lab  The  exam was analyzed utilizing the viz. AI algorithm    Contrast: Isovue 370 80 mL  CT radiation dose optimization techniques (automated exposure control, use of a iterative reconstruction techniques, or adjustment of the mA or KV according to the patients size) were used to limit patient radiation dose. * Nascet criteria: % stenosis = 1-(normal lumen - minimal residual lumen divided by normal lumen) x 100 %    *Stenotic measurements of the arteries were made using Nascet criteria    FINDINGS:  NECK: Normal.  Type I aortic arch. Right innominate artery is normal.  Right common carotid artery is normal.  Common carotid bifurcation is normal.  Right internal carotid is normal. .  Right external carotid artery is patent. Left common carotid artery is normal.  Left common carotid bifurcation is normal..  Left internal carotid artery posterior proximal internal carotid bulb calcific plaque with less than 10% diameter stenosis. Cervical segment of the internal carotid artery is normal.  Left external carotid artery is normal.    Vertebral arteries are patent bilaterally. Origin left and right vertebral arteries are patent. Cervical segments are patent and symmetric. Subclavian arteries are normal    INTRACRANIAL:  Vertebrobasilar junction is normal.  Basal artery is normal.  Left and right posterior cerebral arteries are normal.  .  Supraclinoid internal carotid arteries are normal.  M1, M2, A1 and A2 branches are normal    THoracic Inlet:  Normal  Lung apices:  No soft tissue masses in the neck. Cervical spine:    Skull base: Opacification the right mastoid air cells, chronic mastoiditis. Impression  1. Normal right internal carotid artery. .  2. Normal left internal carotid artery  3. Normal vertebral arteries. 4. Normal intracranial circulation. 5. No intracranial large vessel occlusion, abnormal intracranial enhancement or early draining veins  6. Incidentally, mucosal opacification right mastoid process, chronic mastoiditis            LABS:  All results below personally reviewed. Pertinent positives & negatives are addressed in Impression & Recommendations below. Recent Labs     12/29/21  1354      K 4.2      CO2 25   BUN 19   CREATININE 1.0   GLUCOSE 121*   CALCIUM 8.9   LABALBU 4.5   ALKPHOS 95   ALT 22   AST 10*     Recent Labs     12/29/21  1354   WBC 9.6   RBC 5.10   HGB 15.6   HCT 45.9        Lab Results   Component Value Date    LDLCALC 83 08/31/2021    TRIG 148 08/31/2021     No results found for: PHENYTOIN, KEPPRA, LACOSA, LAMO, VALPROATE, LACTSEPSIS, LACTA        CURRENT SCHEDULED MEDICATIONS:   ketorolac  15 mg IntraVENous Once    prochlorperazine  10 mg IntraVENous Once    sodium chloride  1,000 mL IntraVENous Once              IMPRESSION & RECOMMENDATIONS     IMPRESSION:  His multiple clinical presentations with tPA and negative MRI combined with his clinical exam with giveway weakness, I suspect these are non-neurologic. It's possible they are on the migraine spectrum but I favor conversion.     RECOMMENDATIONS:  -MRI brain wo contrast   -if normal (suspect it will be), ok to discharge  -Would benefit from cognitive behavioral therapy  -Can follow up with [address] [place] [address] [address] Neurology as an outpatient    [address] [place] [address]   Neurology & Neurocritical Care   Neurology Line: [phone]  [employer]: [facility] NPs  12/29/2021 3:45 PM

## 2021-12-29 NOTE — ED PROVIDER NOTES
[address] [facility]          ATTENDING PHYSICIAN NOTE       Date of evaluation: 12/29/2021    Chief Complaint     Extremity Weakness (Pt reports left arm weakness, left facial droop, reported slurred speech. Hx of CVA in 2015. Symptom onset 13:10) and Aphasia      History of Present Illness     Ruthann Wong is a 49 y.o. male who presents by EMS as a code stroke, with reports of sudden onset right-sided headache, left-sided weakness and difficulty speaking. Patient describes that this occurred about a half an hour prior to arrival, while he was seated on the couch. He felt normal when awakening this morning. He denies any recent illnesses, trauma, or other unusual symptoms. Patient states that he had a stroke in 2015 for which she received TPA, and has some residual left-sided weakness which waxes and wanes since that time, although currently he feels like his left-sided weakness is much worse than usual, and he noted that he was having difficulty speaking at the time of symptom onset, although he states that that has now improved. He describes the pain that he states is 8 out of 10 in intensity, on the right frontal aspect of his head, a severe, throbbing sensation, which she has experienced with prior episodes of left-sided weakness in the past.  Patient denies any chest pain or shortness of breath, URI symptoms, cough, abdominal pain, nausea or vomiting, changes in bowel habits. He denies any fevers or chills. On review of records, it is noted that the patient has a history of recurrent episodes of left-sided weakness, and has had numerous ED visits and hospitalizations for similar symptoms. By report from a hospitalization at [facility] in 2019, at that time the patient had received TPA 3 times for similar symptoms, with multiple negative MRIs. He has been diagnosed by neurology in the [facility] system with conversion disorder in 2019.   He has not seen a neurologist in the [place] area since he has moved here. Review of Systems     Review of Systems   Constitutional: Negative. Negative for activity change, appetite change, chills, fatigue and fever. HENT: Negative. Negative for congestion, rhinorrhea and sore throat. Eyes: Negative. Negative for visual disturbance. Respiratory: Negative. Negative for cough and shortness of breath. Cardiovascular: Negative. Negative for chest pain. Gastrointestinal: Negative. Negative for abdominal pain, nausea and vomiting. Genitourinary: Negative. Negative for difficulty urinating and dysuria. Musculoskeletal: Negative. Skin: Negative. Neurological: Positive for facial asymmetry, speech difficulty, weakness and headaches. Negative for syncope. Psychiatric/Behavioral: Negative. Past Medical, Surgical, Family, and Social History     He has a past medical history of Hemiplegic migraine without status migrainosus, not intractable, Hyperlipidemia, Hypertension, and Stroke ([facility] [address] He has no past surgical history on file. His family history includes Cancer in his father; Heart Attack in his mother. He reports that he has never smoked. He has never used smokeless tobacco. He reports that he does not drink alcohol and does not use drugs. Medications     Previous Medications    AMLODIPINE (NORVASC) 5 MG TABLET    Take 2 tablets by mouth daily    ASPIRIN 81 MG CHEWABLE TABLET    Take 81 mg by mouth daily    ATORVASTATIN (LIPITOR) 80 MG TABLET    Take 1 tablet by mouth nightly    CHOLECALCIFEROL 50 MCG (2000 UT) CAPS    Vitamin D3 50 mcg (2,000 unit) capsule   Take 2 capsules every day by oral route. CYANOCOBALAMIN (VITAMIN B 12) 100 MCG LOZG    Take by mouth    DULOXETINE (CYMBALTA) 60 MG EXTENDED RELEASE CAPSULE    Take 60 mg by mouth daily    GABAPENTIN (NEURONTIN) 400 MG CAPSULE    Take 400 mg by mouth 3 times daily.     LISINOPRIL (PRINIVIL;ZESTRIL) 40 MG TABLET    Take 1 tablet by mouth daily    METOPROLOL SUCCINATE (TOPROL XL) 100 MG EXTENDED RELEASE TABLET    Take 1 tablet by mouth daily    NITROGLYCERIN (NITROSTAT) 0.4 MG SL TABLET    up to max of 3 total doses. If no relief after 1 dose, call 911. OMEPRAZOLE (PRILOSEC) 20 MG DELAYED RELEASE CAPSULE    Take 40 mg by mouth daily       Allergies     He is allergic to codeine. Physical Exam     INITIAL VITALS: BP: (!) 143/126, Temp: 98 °F (36.7 °C), Pulse: 99, Resp: 20, SpO2: 100 %     General: Well appearing. Pleasantly conversational, and in NAD. HEENT: Pupils are equal, round, and reactive to light. Extraocular muscles are intact. Conjunctivae are clear and moist. No redness or drainage from the eyes. No drainage from the nose. The oropharynx appeared to be normal.    Neck: Supple, with full range of motion. Cardiovascular: Normal S1-S2 without murmur rub or gallop. 2+ radial pulses bilaterally. Respiratory: Unlabored breathing with equal chest rise and fall. Lungs are clear to auscultation bilaterally. No adventitious lung sounds heard. Abdomen: Soft and nontender, without guarding or rebound tenderness. No masses or hepatosplenomegaly. Skin: Warm and dry, without rashes or ecchymoses, lacerations or abrasions. Neuro: Alert and oriented x3. Patient has asymmetry of his smile on the left, and some mild drift in the left upper and left lower extremity. No aphasia or dysarthria. No visual field deficit. NIHSS:  LOC - 0, 0, 0  Gaze - 0  Vision - 0  Face - 1  Arms - R 0, L 1  Legs - R 0, L 1  Sensory - 0  Ataxia - 0  Aphasia - 0  Dysarthria - 0  Extinction/Neglect - 0  Total = 3    Extremities: Warm and well-perfused, without clubbing, cyanosis, or edema. The patient moves all extremities equally. Psych: The patient's mood and affect are generally within normal limits for their presentation. Diagnostic Results     EKG   Normal sinus rhythm with a ventricular rate of 100 bpm.  Normal axis.   Normal intervals, OH interval 126 ms, QRS duration 74 ms,  ms. There is some generalized T wave flattening throughout. No ST segment changes are noted. This is entirely unchanged from a prior EKG dated August 30, 2021. RADIOLOGY:  MRI BRAIN WO CONTRAST   Final Result      1. Normal MRI of the brain with no evidence of acute ischemia, intracranial hemorrhage, or mass lesion. CTA HEAD NECK W CONTRAST   Final Result   1. Normal right internal carotid artery. .   2. Normal left internal carotid artery   3. Normal vertebral arteries. 4. Normal intracranial circulation. 5. No intracranial large vessel occlusion, abnormal intracranial enhancement or early draining veins   6. Incidentally, mucosal opacification right mastoid process, chronic mastoiditis      CT HEAD WO CONTRAST   Final Result      1. No evidence of acute intracranial hemorrhage or mass effect.              LABS:   Results for orders placed or performed during the hospital encounter of 12/29/21   CBC auto differential   Result Value Ref Range    WBC 9.6 4.0 - 11.0 K/uL    RBC 5.10 4.20 - 5.90 M/uL    Hemoglobin 15.6 13.5 - 17.5 g/dL    Hematocrit 45.9 40.5 - 52.5 %    MCV 90.1 80.0 - 100.0 fL    MCH 30.7 26.0 - 34.0 pg    MCHC 34.0 31.0 - 36.0 g/dL    RDW 14.1 12.4 - 15.4 %    Platelets 329 716 - 503 K/uL    MPV 7.6 5.0 - 10.5 fL    Neutrophils % 75.7 %    Lymphocytes % 15.4 %    Monocytes % 7.2 %    Eosinophils % 1.5 %    Basophils % 0.2 %    Neutrophils Absolute 7.3 1.7 - 7.7 K/uL    Lymphocytes Absolute 1.5 1.0 - 5.1 K/uL    Monocytes Absolute 0.7 0.0 - 1.3 K/uL    Eosinophils Absolute 0.1 0.0 - 0.6 K/uL    Basophils Absolute 0.0 0.0 - 0.2 K/uL   Comprehensive Metabolic Panel w/ Reflex to MG   Result Value Ref Range    Sodium 139 136 - 145 mmol/L    Potassium reflex Magnesium 4.2 3.5 - 5.1 mmol/L    Chloride 102 99 - 110 mmol/L    CO2 25 21 - 32 mmol/L    Anion Gap 12 3 - 16    Glucose 121 (H) 70 - 99 mg/dL    BUN 19 7 - 20 mg/dL    CREATININE 1.0 0.9 - 1.3 mg/dL GFR Non-African American >60 >60    GFR African American >60 >60    Calcium 8.9 8.3 - 10.6 mg/dL    Total Protein 7.1 6.4 - 8.2 g/dL    Albumin 4.5 3.4 - 5.0 g/dL    Albumin/Globulin Ratio 1.7 1.1 - 2.2    Total Bilirubin 0.4 0.0 - 1.0 mg/dL    Alkaline Phosphatase 95 40 - 129 U/L    ALT 22 10 - 40 U/L    AST 10 (L) 15 - 37 U/L   Troponin (lab)   Result Value Ref Range    Troponin <0.01 <0.01 ng/mL   POCT Glucose   Result Value Ref Range    POC Glucose 130 (H) 70 - 99 mg/dl    Performed on ACCU-CHEK    EKG 12 Lead   Result Value Ref Range    Ventricular Rate 100 BPM    Atrial Rate 100 BPM    P-R Interval 126 ms    QRS Duration 74 ms    Q-T Interval 314 ms    QTc Calculation (Bazett) 405 ms    P Axis 35 degrees    R Axis 2 degrees    T Axis 18 degrees    Diagnosis       EKG performed in ER and to be interpreted by ER physician. Confirmed by MD, ER ([id number]),  Bryn Sears ([phone]) on 12/29/2021 4:28:51 PM       RECENT VITALS:  BP: (!) 145/93, Temp: 98 °F (36.7 °C), Pulse: 96, Resp: 16, SpO2: 95 %     Procedures       ED Course     Nursing Notes, Past Medical Hx, Past Surgical Hx, Social Hx, Allergies, and Family Hx were reviewed. The patient was given the following medications:  Orders Placed This Encounter   Medications    iopamidol (ISOVUE-370) 76 % injection 80 mL    ketorolac (TORADOL) injection 15 mg    prochlorperazine (COMPAZINE) injection 10 mg    0.9 % sodium chloride bolus       CONSULTS:  IP CONSULT TO HOSPITALIST  IP CONSULT TO NEUROLOGY  Stroke Team    MEDICAL DECISION MAKING / ASSESSMENT / Nancy Korina is a 49 y.o. male with a self-reported history of prior stroke status post TPA, with some residual or recurrent episodes of left-sided weakness since that time, who presents with sudden onset of a severe right-sided headache with associated worsening of left-sided weakness.   He describes that the initial presentation was also associated with difficulty speaking, which has since Impression     1. Left-sided weakness        Disposition     PATIENT REFERRED TO:  Kelsie Bermudez  Neurology  [place] [phone]  Schedule an appointment as soon as possible for a visit         DISCHARGE MEDICATIONS:  New Prescriptions    No medications on file       DISPOSITION Discharged    (Please note that portions of this note were completed with voice recognition software.   Efforts were made to edit the dictations but occasionally words are mis-transcribed.)     Georgette Obrien MD  12/29/21 [id number]

## 2022-03-24 NOTE — PROGRESS NOTES
Place patient label inside box (if no patient label, complete below)  Name:  :  MR#:   Kamila Medico / PROCEDURE  1. I (we), Manoj Magansara (Patient Name) authorize Jenn Wiggins MD (Provider / [facility]) and/or such assistants as may be selected by him/her, to perform the following operation/procedure(s): LEFT KNEE DIAGNOSTIC ARTHROSCOPY, POSSIBLE MEDIAL MENISCUS REPAIR VERSUS PARTIAL MENISCECTOMY        Note: If unable to obtain consent prior to an emergent procedure, document the emergent reason in the medical record. This procedure has been explained to my (our) satisfaction and included in the explanation was:  A) The intended benefit, nature, and extent of the procedure to be performed;  B) The significant risks involved and the probability of success;  C) Alternative procedures and methods of treatment;  D) The dangers and probable consequences of such alternatives (including no procedure or treatment); E) The expected consequences of the procedure on my future health;  F) Whether other qualified individuals would be performing important surgical tasks and/or whether  would be present to advise or support the procedure. I (we) understand that there are other risks of infection and other serious complications in the pre-operative/procedural and postoperative/procedural stages of my (our) care. I (we) have asked all of the questions which I (we) thought were important in deciding whether or not to undergo treatment or diagnosis. These questions have been answered to my (our) satisfaction. I (we) understand that no assurance can be given that the procedure will be a success, and no guarantee or warranty of success has been given to me (us).     2. It has been explained to me (us) that during the course of the operation/procedure, unforeseen conditions may be revealed that necessitate extension of the original procedure(s) or different procedure(s) than those set forth in Paragraph 1. I (we) authorize and request that the above-named physician, his/her assistants or his/her designees, perform procedures as necessary and desirable if deemed to be in my (our) best interest.     Revised 8/2/2021                                                                          Page 1 of 2                   3. I acknowledge that health care personnel may be observing this procedure for the purpose of medical education or other specified purposes as may be necessary as requested and/or approved by my (our) physician. 4. I (we) consent to the disposal by the hospital Pathologist of the removed tissue, parts or organs in accordance with hospital policy. 5. I do ____ do not ____ consent to the use of a local infiltration pain blocking agent that will be used by my provider/surgical provider to help alleviate pain during my procedure. 6. I do ____ do not ____ consent to an emergent blood transfusion in the case of a life-threatening situation that requires blood components to be administered. This consent is valid for 24 hours from the beginning of the procedure. 7. This patient does ____ or does not ____ currently have a DNR status/order. If DNR order is in place, obtain Addendum to the Surgical Consent for ALL Patients with a DNR Order to address destin-operative status for limited intervention or DNR suspension.      8. I have read and fully understand the above Consent for Operation/Procedure and that all blanks were completed before I signed the consent.   _____________________________       _____________________      ____/____am/pm  Signature of Patient or legal representative      Printed Name / Relationship            Date / Time   ____________________________       _____________________      ____/____am/pm  Witness to Signature                                    Printed Name                    Date / Time     If patient is unable to sign or is a minor, complete the following)  Patient is a minor, ____ years of age, or unable to sign because:   ______________________________________________________________________________________________    [facility] If a phone consent is obtained, consent will be documented by using two health care professionals, each affirming that the consenting party has no questions and gives consent for the procedure discussed with the physician/provider.   _____________________          ____________________       _____/_____am/pm   2nd witness to phone consent        Printed name           Date / Time    Informed Consent:  I have provided the explanation described above in section 1 to the patient and/or legal representative.  I have provided the patient and/or legal representative with an opportunity to ask any questions about the proposed operation/procedure.   ___________________________          ____________________         ____/____am/pm  Provider / Proceduralist                            Printed name            Date / Time  Revised 8/2/2021                                                                      Page 2 of 2

## 2022-03-24 NOTE — PROGRESS NOTES
Place patient label inside box (if no patient label, complete below)  Name:  :  MR#:   Claudia Garduno / PROCEDURE  1. I (we), Estephania Kolb (Patient Name) authorize Su Bernstein MD (Provider / [facility]) and/or such assistants as may be selected by him/her, to perform the following operation/procedure(s): LEFT KNEE DIAGNOSTIC ARTHROSCOPY, POSSIBLE MEDIAL MENISCUS REPAIR VERSUS PARTIAL MENISCECTOMY        Note: If unable to obtain consent prior to an emergent procedure, document the emergent reason in the medical record. This procedure has been explained to my (our) satisfaction and included in the explanation was:  A) The intended benefit, nature, and extent of the procedure to be performed;  B) The significant risks involved and the probability of success;  C) Alternative procedures and methods of treatment;  D) The dangers and probable consequences of such alternatives (including no procedure or treatment); E) The expected consequences of the procedure on my future health;  F) Whether other qualified individuals would be performing important surgical tasks and/or whether  would be present to advise or support the procedure. I (we) understand that there are other risks of infection and other serious complications in the pre-operative/procedural and postoperative/procedural stages of my (our) care. I (we) have asked all of the questions which I (we) thought were important in deciding whether or not to undergo treatment or diagnosis. These questions have been answered to my (our) satisfaction. I (we) understand that no assurance can be given that the procedure will be a success, and no guarantee or warranty of success has been given to me (us).     2. It has been explained to me (us) that during the course of the operation/procedure, unforeseen conditions may be revealed that necessitate extension of the original procedure(s) or different procedure(s) than those set forth in Paragraph 1. I (we) authorize and request that the above-named physician, his/her assistants or his/her designees, perform procedures as necessary and desirable if deemed to be in my (our) best interest.     Revised 8/2/2021                                                                          Page 1 of 2                   3. I acknowledge that health care personnel may be observing this procedure for the purpose of medical education or other specified purposes as may be necessary as requested and/or approved by my (our) physician. 4. I (we) consent to the disposal by the hospital Pathologist of the removed tissue, parts or organs in accordance with hospital policy. 5. I do ____ do not ____ consent to the use of a local infiltration pain blocking agent that will be used by my provider/surgical provider to help alleviate pain during my procedure. 6. I do ____ do not ____ consent to an emergent blood transfusion in the case of a life-threatening situation that requires blood components to be administered. This consent is valid for 24 hours from the beginning of the procedure. 7. This patient does ____ or does not ____ currently have a DNR status/order. If DNR order is in place, obtain Addendum to the Surgical Consent for ALL Patients with a DNR Order to address destin-operative status for limited intervention or DNR suspension.      8. I have read and fully understand the above Consent for Operation/Procedure and that all blanks were completed before I signed the consent.   _____________________________       _____________________      ____/____am/pm  Signature of Patient or legal representative      Printed Name / Relationship            Date / Time   ____________________________       _____________________      ____/____am/pm  Witness to Signature                                    Printed Name                    Date / Time     If patient is unable to sign or is a minor, complete the following)  Patient is a minor, ____ years of age, or unable to sign because:   ______________________________________________________________________________________________    [facility] If a phone consent is obtained, consent will be documented by using two health care professionals, each affirming that the consenting party has no questions and gives consent for the procedure discussed with the physician/provider.   _____________________          ____________________       _____/_____am/pm   2nd witness to phone consent        Printed name           Date / Time    Informed Consent:  I have provided the explanation described above in section 1 to the patient and/or legal representative.  I have provided the patient and/or legal representative with an opportunity to ask any questions about the proposed operation/procedure.   ___________________________          ____________________         ____/____am/pm  Provider / Proceduralist                            Printed name            Date / Time  Revised 8/2/2021                                                                      Page 2 of 2

## 2022-03-24 NOTE — PROGRESS NOTES
[facility] PRE-SURGICAL TESTING INSTRUCTIONS                                  PRIOR TO PROCEDURE DATE:        1. PLEASE FOLLOW ANY  GUIDELINES/ INSTRUCTIONS PRIOR TO YOUR PROCEDURE AS ADVISED BY YOUR SURGEON. 2. Arrange for someone to drive you home and be with you for the first 24 hours after discharge for your safety after your procedure for which you received sedation. Ensure it is someone we can share information with regarding your discharge. 3. You must contact your surgeon for instructions IF:   You are taking any blood thinners, aspirin, anti-inflammatory or vitamin E.   There is a change in your physical condition such as a cold, fever, rash, cuts, sores or any other infection, especially near your surgical site. 4. Do not drink alcohol the day before or day of your procedure. 5. A Pre-op History and Physical for surgery MUST be completed by your Physician or Urgent Care within 30 days of your procedure date. Please bring a copy with you on the day of your procedure and along with any other testing performed. THE DAY OF YOUR PROCEDURE:  1. Follow instructions for ARRIVAL TIME as DIRECTED BY YOUR SURGEON. 2. Enter the MAIN entrance from [employer] and follow the signs to the free [email or web address] or [employer] parking (offered free of charge 6am-5pm). 3. Enter the Main Entrance of the hospital (do not enter from the lower level of the parking garage). Upon entrance, check in with the  at the main desk on your left. If no one is available at the desk, proceed into the [facility] Waiting Room and go through the door directly into the [facility]. There is a Check-in desk ACROSS from Room 5 (marked with a sign hanging from the ceiling). The phone number for the surgery center is [phone]. 4. Please call [phone] option #2 option #2 if you have not been preregistered yet.   On the day of your procedure bring your insurance card and photo ID. You will be registered at your bedside once brought back to your room. 5. DO NOT EAT ANYTHING eight hours prior to your arrival for surgery. May have 8 ounces of water 4 hours prior to your arrival for surgery. NOTE: ALL Gastric, Bariatric and Bowel surgery patients MUST follow their surgeon's instructions. 6. MEDICATIONS    Take the following medications with a SMALL sip of water:  Omeprazole, norvasc.  Bariatric patient's call surgeon if on diabetic medications as some need to be stopped 1 week preop   Use your usual dose of inhalers the morning of surgery. BRING your rescue inhaler with you to hospital.    Anesthesia does NOT want you to take insulin the morning of surgery. They will control your blood sugar while you are at the hospital. Please contact your ordering physician for instructions regarding your insulin the night before your procedure. If you have an insulin pump, please keep it set on basal rate. 7. Do not swallow water when brushing teeth. No gum, candy, mints or ice chips. Refrain from smoking or at least decrease the amount. 8. Dress in loose, comfortable clothing appropriate for redressing after your procedure. Do not wear jewelry (including body piercings), make-up (especially NO eye make-up), fingernail polish (NO toenail polish if foot/leg surgery), lotion, powders or metal hairclips. 9. Dentures, glasses, or contacts will need to be removed before your procedure. Bring cases for your glasses, contacts, dentures, or hearing aids to protect them while you are in surgery. 10. If you use a CPAP, please bring it with you on the day of your procedure. 11. We recommend that valuable personal  belongings such as cash, cell phones, e-tablets or jewelry, be left at home during your stay. The hospital will not be responsible for valuables that are not secured in the hospital safe.  However, if your insurance requires a co-pay, you may want to bring a method of payment, i.e. Check or credit card, if you wish to pay your co-pay the day of surgery. 12. If you are to stay overnight, you may bring a bag with personal items. Please have any large items you may need brought in by your family after your arrival to your hospital room. 15. If you have a Living Will or Durable Power of , please bring a copy on the day of your procedure. 15. With your permission, one family member may accompany you while you are being prepared for surgery. Once you are ready, additional family members may join you. HOW WE KEEP YOU SAFE and WORK TO PREVENT SURGICAL SITE INFECTIONS:  1. Health care workers should always check your ID bracelet to verify your name and birth date. You will be asked many times to state your name, date of birth, and allergies. 2. Health care workers should always clean their hands with soap or alcohol gel before providing care to you. It is okay to ask anyone if they cleaned their hands before they touch you. 3. You will be actively involved in verifying the type of procedure you are having and ensuring the correct surgical site. This will be confirmed multiple times prior to your procedure. Do NOT naeem your surgery site UNLESS instructed to by your surgeon. 4. Do not shave or wax for 72 hours prior to procedure near your operative site. Shaving with a razor can irritate your skin and make it easier to develop an infection. On the day of your procedure, any hair that needs to be removed near the surgical site will be clipped by a healthcare worker using a special clippers designed to avoid skin irritation. 5. When you are in the operating room, your surgical site will be cleansed with a special soap, and in most cases, you will be given an antibiotic before the surgery begins. What to expect AFTER YOUR PROCEDURE:  1. Immediately following your procedure, your will be taken to the PACU for the first phase of your recovery.   Your nurse will help you recover from any potential side effects of anesthesia, such as extreme drowsiness, changes in your vital signs or breathing patterns. Nausea, headache, muscle aches, or sore throat may also occur after anesthesia. Your nurse will help you manage these potential side effects. 2. For comfort and safety, arrange to have someone at home with you for the first 24 hours after discharge. 3. You and your family will be given written instructions about your diet, activity, dressing care, medications, and return visits. 4. Once at home, should issues with nausea, pain, or bleeding occur, or should you notice any signs of infection, you should call your surgeon. 5. Always clean your hands before and after caring for your wound. Do not let your family touch your surgery site without cleaning their hands. 6. Narcotic pain medications can cause significant constipation. You may want to add a stool softener to your postoperative medication schedule or speak to your surgeon on how best to manage this SIDE EFFECT. SPECIAL INSTRUCTIONS     Thank you for allowing us to care for you. We strive to exceed your expectations in the delivery of care and service provided to you and your family. If you need to contact the [place] [address] staff for any reason, please call us at [phone]    Instructions reviewed with patient during preadmission testing phone interview.   Lana Fierro RN.3/24/2022 .3:03 PM      ADDITIONAL EDUCATIONAL INFORMATION REVIEWED PER PHONE WITH YOU AND/OR YOUR FAMILY:  No Hibiclens® Bathing Instructions   Yes Antibacterial Soap

## 2022-04-05 ENCOUNTER — ANESTHESIA EVENT (OUTPATIENT)
Dept: OPERATING ROOM | Age: 52
End: 2022-04-05
Payer: MEDICARE

## 2022-04-06 ENCOUNTER — ANESTHESIA (OUTPATIENT)
Dept: OPERATING ROOM | Age: 52
End: 2022-04-06
Payer: MEDICARE

## 2022-04-06 ENCOUNTER — HOSPITAL ENCOUNTER (OUTPATIENT)
Age: 52
Setting detail: OUTPATIENT SURGERY
Discharge: HOME | End: 2022-04-06
Attending: ORTHOPAEDIC SURGERY | Admitting: ORTHOPAEDIC SURGERY
Payer: MEDICARE

## 2022-04-06 VITALS
HEIGHT: 68 IN | RESPIRATION RATE: 16 BRPM | WEIGHT: 209.6 LBS | SYSTOLIC BLOOD PRESSURE: 125 MMHG | BODY MASS INDEX: 31.77 KG/M2 | HEART RATE: 81 BPM | OXYGEN SATURATION: 95 % | TEMPERATURE: 97.9 F | DIASTOLIC BLOOD PRESSURE: 85 MMHG

## 2022-04-06 VITALS — TEMPERATURE: 95.9 F | SYSTOLIC BLOOD PRESSURE: 99 MMHG | OXYGEN SATURATION: 94 % | DIASTOLIC BLOOD PRESSURE: 59 MMHG

## 2022-04-06 DIAGNOSIS — Z98.890 S/P LEFT KNEE ARTHROSCOPY: Primary | ICD-10-CM

## 2022-04-06 PROCEDURE — 6360000002 HC RX W HCPCS: Performed by: NURSE ANESTHETIST, CERTIFIED REGISTERED

## 2022-04-06 PROCEDURE — 6360000002 HC RX W HCPCS: Performed by: ANESTHESIOLOGY

## 2022-04-06 PROCEDURE — 3600000004 HC SURGERY LEVEL 4 BASE: Performed by: ORTHOPAEDIC SURGERY

## 2022-04-06 PROCEDURE — 64447 NJX AA&/STRD FEMORAL NRV IMG: CPT | Performed by: ANESTHESIOLOGY

## 2022-04-06 PROCEDURE — 6370000000 HC RX 637 (ALT 250 FOR IP): Performed by: ORTHOPAEDIC SURGERY

## 2022-04-06 PROCEDURE — 2709999900 HC NON-CHARGEABLE SUPPLY: Performed by: ORTHOPAEDIC SURGERY

## 2022-04-06 PROCEDURE — 2580000003 HC RX 258: Performed by: FAMILY MEDICINE

## 2022-04-06 PROCEDURE — 3700000000 HC ANESTHESIA ATTENDED CARE: Performed by: ORTHOPAEDIC SURGERY

## 2022-04-06 PROCEDURE — 6360000002 HC RX W HCPCS: Performed by: ORTHOPAEDIC SURGERY

## 2022-04-06 PROCEDURE — 7100000010 HC PHASE II RECOVERY - FIRST 15 MIN: Performed by: ORTHOPAEDIC SURGERY

## 2022-04-06 PROCEDURE — 3600000014 HC SURGERY LEVEL 4 ADDTL 15MIN: Performed by: ORTHOPAEDIC SURGERY

## 2022-04-06 PROCEDURE — 7100000011 HC PHASE II RECOVERY - ADDTL 15 MIN: Performed by: ORTHOPAEDIC SURGERY

## 2022-04-06 PROCEDURE — 3700000001 HC ADD 15 MINUTES (ANESTHESIA): Performed by: ORTHOPAEDIC SURGERY

## 2022-04-06 PROCEDURE — 7100000000 HC PACU RECOVERY - FIRST 15 MIN: Performed by: ORTHOPAEDIC SURGERY

## 2022-04-06 PROCEDURE — 2720000010 HC SURG SUPPLY STERILE: Performed by: ORTHOPAEDIC SURGERY

## 2022-04-06 PROCEDURE — 7100000001 HC PACU RECOVERY - ADDTL 15 MIN: Performed by: ORTHOPAEDIC SURGERY

## 2022-04-06 PROCEDURE — 2580000003 HC RX 258: Performed by: ORTHOPAEDIC SURGERY

## 2022-04-06 RX ORDER — LABETALOL HYDROCHLORIDE 5 MG/ML
10 INJECTION, SOLUTION INTRAVENOUS
Status: DISCONTINUED | OUTPATIENT
Start: 2022-04-06 | End: 2022-04-06 | Stop reason: HOSPADM

## 2022-04-06 RX ORDER — SODIUM CHLORIDE 9 MG/ML
25 INJECTION, SOLUTION INTRAVENOUS PRN
Status: DISCONTINUED | OUTPATIENT
Start: 2022-04-06 | End: 2022-04-06 | Stop reason: HOSPADM

## 2022-04-06 RX ORDER — PROPOFOL 10 MG/ML
INJECTION, EMULSION INTRAVENOUS PRN
Status: DISCONTINUED | OUTPATIENT
Start: 2022-04-06 | End: 2022-04-06 | Stop reason: SDUPTHER

## 2022-04-06 RX ORDER — LIDOCAINE HYDROCHLORIDE 20 MG/ML
INJECTION, SOLUTION INTRAVENOUS PRN
Status: DISCONTINUED | OUTPATIENT
Start: 2022-04-06 | End: 2022-04-06 | Stop reason: SDUPTHER

## 2022-04-06 RX ORDER — ROPIVACAINE HYDROCHLORIDE 5 MG/ML
INJECTION, SOLUTION EPIDURAL; INFILTRATION; PERINEURAL
Status: COMPLETED | OUTPATIENT
Start: 2022-04-06 | End: 2022-04-06

## 2022-04-06 RX ORDER — OXYCODONE HYDROCHLORIDE 5 MG/1
5 TABLET ORAL EVERY 6 HOURS PRN
Qty: 20 TABLET | Refills: 0 | Status: SHIPPED | OUTPATIENT
Start: 2022-04-06 | End: 2022-04-13

## 2022-04-06 RX ORDER — ROPIVACAINE HYDROCHLORIDE 5 MG/ML
30 INJECTION, SOLUTION EPIDURAL; INFILTRATION; PERINEURAL ONCE
Status: DISCONTINUED | OUTPATIENT
Start: 2022-04-06 | End: 2022-04-06 | Stop reason: HOSPADM

## 2022-04-06 RX ORDER — ONDANSETRON 4 MG/1
4 TABLET, ORALLY DISINTEGRATING ORAL EVERY 8 HOURS PRN
Qty: 21 TABLET | Refills: 0 | Status: SHIPPED | OUTPATIENT
Start: 2022-04-06 | End: 2022-10-10

## 2022-04-06 RX ORDER — ACETAMINOPHEN 500 MG
1000 TABLET ORAL ONCE
Status: COMPLETED | OUTPATIENT
Start: 2022-04-06 | End: 2022-04-06

## 2022-04-06 RX ORDER — ONDANSETRON 2 MG/ML
4 INJECTION INTRAMUSCULAR; INTRAVENOUS
Status: DISCONTINUED | OUTPATIENT
Start: 2022-04-06 | End: 2022-04-06 | Stop reason: HOSPADM

## 2022-04-06 RX ORDER — SODIUM CHLORIDE 0.9 % (FLUSH) 0.9 %
5-40 SYRINGE (ML) INJECTION EVERY 12 HOURS SCHEDULED
Status: DISCONTINUED | OUTPATIENT
Start: 2022-04-06 | End: 2022-04-06 | Stop reason: HOSPADM

## 2022-04-06 RX ORDER — GABAPENTIN 300 MG/1
300 CAPSULE ORAL
Status: COMPLETED | OUTPATIENT
Start: 2022-04-06 | End: 2022-04-06

## 2022-04-06 RX ORDER — SODIUM CHLORIDE, SODIUM LACTATE, POTASSIUM CHLORIDE, CALCIUM CHLORIDE 600; 310; 30; 20 MG/100ML; MG/100ML; MG/100ML; MG/100ML
INJECTION, SOLUTION INTRAVENOUS CONTINUOUS
Status: DISCONTINUED | OUTPATIENT
Start: 2022-04-06 | End: 2022-04-06 | Stop reason: HOSPADM

## 2022-04-06 RX ORDER — SODIUM CHLORIDE 0.9 % (FLUSH) 0.9 %
5-40 SYRINGE (ML) INJECTION PRN
Status: DISCONTINUED | OUTPATIENT
Start: 2022-04-06 | End: 2022-04-06 | Stop reason: HOSPADM

## 2022-04-06 RX ORDER — AMOXICILLIN 250 MG
2 CAPSULE ORAL DAILY PRN
Qty: 30 TABLET | Refills: 2 | Status: SHIPPED | OUTPATIENT
Start: 2022-04-06 | End: 2022-10-10

## 2022-04-06 RX ORDER — CELECOXIB 200 MG/1
400 CAPSULE ORAL
Status: COMPLETED | OUTPATIENT
Start: 2022-04-06 | End: 2022-04-06

## 2022-04-06 RX ORDER — OXYCODONE HYDROCHLORIDE 5 MG/1
5 TABLET ORAL PRN
Status: DISCONTINUED | OUTPATIENT
Start: 2022-04-06 | End: 2022-04-06 | Stop reason: HOSPADM

## 2022-04-06 RX ORDER — SODIUM CHLORIDE, SODIUM LACTATE, POTASSIUM CHLORIDE, AND CALCIUM CHLORIDE .6; .31; .03; .02 G/100ML; G/100ML; G/100ML; G/100ML
IRRIGANT IRRIGATION PRN
Status: DISCONTINUED | OUTPATIENT
Start: 2022-04-06 | End: 2022-04-06 | Stop reason: ALTCHOICE

## 2022-04-06 RX ORDER — ASPIRIN 81 MG/1
81 TABLET, CHEWABLE ORAL 2 TIMES DAILY
Qty: 30 TABLET | Refills: 3 | Status: SHIPPED | OUTPATIENT
Start: 2022-04-06

## 2022-04-06 RX ORDER — IPRATROPIUM BROMIDE AND ALBUTEROL SULFATE 2.5; .5 MG/3ML; MG/3ML
1 SOLUTION RESPIRATORY (INHALATION)
Status: DISCONTINUED | OUTPATIENT
Start: 2022-04-06 | End: 2022-04-06 | Stop reason: HOSPADM

## 2022-04-06 RX ORDER — PHENYLEPHRINE HYDROCHLORIDE 10 MG/ML
INJECTION INTRAVENOUS PRN
Status: DISCONTINUED | OUTPATIENT
Start: 2022-04-06 | End: 2022-04-06 | Stop reason: SDUPTHER

## 2022-04-06 RX ORDER — FENTANYL CITRATE 50 UG/ML
50 INJECTION, SOLUTION INTRAMUSCULAR; INTRAVENOUS EVERY 5 MIN PRN
Status: DISCONTINUED | OUTPATIENT
Start: 2022-04-06 | End: 2022-04-06 | Stop reason: HOSPADM

## 2022-04-06 RX ORDER — OXYCODONE HYDROCHLORIDE 5 MG/1
10 TABLET ORAL PRN
Status: DISCONTINUED | OUTPATIENT
Start: 2022-04-06 | End: 2022-04-06 | Stop reason: HOSPADM

## 2022-04-06 RX ORDER — DEXAMETHASONE SODIUM PHOSPHATE 10 MG/ML
10 INJECTION, SOLUTION INTRAMUSCULAR; INTRAVENOUS ONCE
Status: COMPLETED | OUTPATIENT
Start: 2022-04-06 | End: 2022-04-06

## 2022-04-06 RX ORDER — MIDAZOLAM HYDROCHLORIDE 1 MG/ML
2 INJECTION INTRAMUSCULAR; INTRAVENOUS ONCE
Status: COMPLETED | OUTPATIENT
Start: 2022-04-06 | End: 2022-04-06

## 2022-04-06 RX ORDER — METOCLOPRAMIDE HYDROCHLORIDE 5 MG/ML
10 INJECTION INTRAMUSCULAR; INTRAVENOUS
Status: DISCONTINUED | OUTPATIENT
Start: 2022-04-06 | End: 2022-04-06 | Stop reason: HOSPADM

## 2022-04-06 RX ORDER — LORAZEPAM 2 MG/ML
0.5 INJECTION INTRAMUSCULAR
Status: DISCONTINUED | OUTPATIENT
Start: 2022-04-06 | End: 2022-04-06 | Stop reason: HOSPADM

## 2022-04-06 RX ORDER — FENTANYL CITRATE 50 UG/ML
INJECTION, SOLUTION INTRAMUSCULAR; INTRAVENOUS PRN
Status: DISCONTINUED | OUTPATIENT
Start: 2022-04-06 | End: 2022-04-06 | Stop reason: SDUPTHER

## 2022-04-06 RX ADMIN — DEXAMETHASONE SODIUM PHOSPHATE 10 MG: 10 INJECTION, SOLUTION INTRAMUSCULAR; INTRAVENOUS at 08:45

## 2022-04-06 RX ADMIN — ROPIVACAINE HYDROCHLORIDE 20 ML: 5 INJECTION, SOLUTION EPIDURAL; INFILTRATION; PERINEURAL at 09:47

## 2022-04-06 RX ADMIN — ACETAMINOPHEN 1000 MG: 500 TABLET ORAL at 08:44

## 2022-04-06 RX ADMIN — LIDOCAINE HYDROCHLORIDE 50 MG: 20 INJECTION, SOLUTION INTRAVENOUS at 10:56

## 2022-04-06 RX ADMIN — CELECOXIB 400 MG: 200 CAPSULE ORAL at 08:44

## 2022-04-06 RX ADMIN — MIDAZOLAM 2 MG: 1 INJECTION, SOLUTION INTRAMUSCULAR; INTRAVENOUS at 09:40

## 2022-04-06 RX ADMIN — GABAPENTIN 300 MG: 300 CAPSULE ORAL at 08:45

## 2022-04-06 RX ADMIN — SODIUM CHLORIDE, POTASSIUM CHLORIDE, SODIUM LACTATE AND CALCIUM CHLORIDE: 600; 310; 30; 20 INJECTION, SOLUTION INTRAVENOUS at 08:44

## 2022-04-06 RX ADMIN — PHENYLEPHRINE HYDROCHLORIDE 50 MCG: 10 INJECTION INTRAVENOUS at 11:12

## 2022-04-06 RX ADMIN — FENTANYL CITRATE 100 MCG: 50 INJECTION, SOLUTION INTRAMUSCULAR; INTRAVENOUS at 10:55

## 2022-04-06 RX ADMIN — PHENYLEPHRINE HYDROCHLORIDE 50 MCG: 10 INJECTION INTRAVENOUS at 11:09

## 2022-04-06 RX ADMIN — PROPOFOL 150 MG: 10 INJECTION, EMULSION INTRAVENOUS at 10:57

## 2022-04-06 ASSESSMENT — PULMONARY FUNCTION TESTS
PIF_VALUE: 25
PIF_VALUE: 1
PIF_VALUE: 27
PIF_VALUE: 26
PIF_VALUE: 25
PIF_VALUE: 15
PIF_VALUE: 5
PIF_VALUE: 26
PIF_VALUE: 27
PIF_VALUE: 8
PIF_VALUE: 26
PIF_VALUE: 7
PIF_VALUE: 4
PIF_VALUE: 15
PIF_VALUE: 5
PIF_VALUE: 25
PIF_VALUE: 6
PIF_VALUE: 26
PIF_VALUE: 1
PIF_VALUE: 5
PIF_VALUE: 2
PIF_VALUE: 25
PIF_VALUE: 26
PIF_VALUE: 15
PIF_VALUE: 27
PIF_VALUE: 8
PIF_VALUE: 2
PIF_VALUE: 27
PIF_VALUE: 26
PIF_VALUE: 24
PIF_VALUE: 14
PIF_VALUE: 25
PIF_VALUE: 0
PIF_VALUE: 25
PIF_VALUE: 27
PIF_VALUE: 26
PIF_VALUE: 8
PIF_VALUE: 24
PIF_VALUE: 26
PIF_VALUE: 8
PIF_VALUE: 19
PIF_VALUE: 26
PIF_VALUE: 19
PIF_VALUE: 26
PIF_VALUE: 18
PIF_VALUE: 3
PIF_VALUE: 3
PIF_VALUE: 27
PIF_VALUE: 25
PIF_VALUE: 25
PIF_VALUE: 27

## 2022-04-06 ASSESSMENT — PAIN DESCRIPTION - FREQUENCY: FREQUENCY: CONTINUOUS

## 2022-04-06 ASSESSMENT — PAIN SCALES - GENERAL
PAINLEVEL_OUTOF10: 1
PAINLEVEL_OUTOF10: 0
PAINLEVEL_OUTOF10: 0
PAINLEVEL_OUTOF10: 4
PAINLEVEL_OUTOF10: 0

## 2022-04-06 ASSESSMENT — PAIN - FUNCTIONAL ASSESSMENT: PAIN_FUNCTIONAL_ASSESSMENT: 0-10

## 2022-04-06 ASSESSMENT — PAIN DESCRIPTION - DESCRIPTORS
DESCRIPTORS: ACHING
DESCRIPTORS: ACHING

## 2022-04-06 ASSESSMENT — PAIN DESCRIPTION - ORIENTATION: ORIENTATION: LEFT

## 2022-04-06 ASSESSMENT — PAIN DESCRIPTION - LOCATION: LOCATION: KNEE

## 2022-04-06 NOTE — PROGRESS NOTES
Ambulatory Surgery/Procedure Discharge Note    Vitals:    04/06/22 1309   BP: 125/85   Pulse: 81   Resp: 16   Temp: 97.9 °F (36.6 °C)   SpO2: 95%       In: 290 [P.O.:240; I.V.:50]  Out: 1    Drank Pepsi and tolerated well. Restroom use offered before discharge. Yes    Pain assessment:  present - adequately treated  Pain Level: 1    Discharge instructions reviewed with patient/responsible adult and understanding verbalized. Discharge instructions signed and copies given. Patient to be discharged home with belongings. Rx for oxycodone, zofran ASA and pericolace given. Wife with pt. Left knee dressing dry and intact. See flow sheet for assessment. Crutches given and adjusted and pt. Has used crutches before. Patient discharged to home/self care.  Patient discharged via wheel chair by transporter to waiting family/S.O.       4/6/2022 1:34 PM

## 2022-04-06 NOTE — ANESTHESIA POSTPROCEDURE EVALUATION
Department of Anesthesiology  Postprocedure Note    Patient: Niko Kyle  MRN: [id number]  YOB: 1970  Date of evaluation: 4/6/2022  Time:  1:36 PM     Procedure Summary     Date: 04/06/22 Room / Location: [address] [place] [address] [address]    Anesthesia Start: 1925 Anesthesia Stop: 4906    Procedure: LEFT KNEE DIAGNOSTIC ARTHROSCOPY,  SYNOVECTOMY,  PARTIAL MEDIAL MENISCECTOMY (Left Knee) Diagnosis:       Peripheral tear of medial meniscus of left knee as current injury, initial encounter      (Peripheral tear of medial meniscus of left knee as current injury, initial encounter [J21.079K])    Surgeons: Raphael Pena MD Responsible Provider: Fredy Jimenez MD    Anesthesia Type: general, regional ASA Status: 3          Anesthesia Type: general, regional    Sebastián Phase I: Sebastián Score: 10    Sebastián Phase II:      Last vitals: Reviewed and per EMR flowsheets.        Anesthesia Post Evaluation    Patient location during evaluation: PACU  Level of consciousness: awake and alert  Airway patency: patent  Nausea & Vomiting: no vomiting  Complications: no  Cardiovascular status: blood pressure returned to baseline  Respiratory status: acceptable  Hydration status: euvolemic  Multimodal analgesia pain management approach

## 2022-04-06 NOTE — ANESTHESIA PRE PROCEDURE
Department of Anesthesiology  Preprocedure Note       Name:  Will Spring   Age:  49 y.o.  :  1970                                          MRN:  [id number]         Date:  2022      Surgeon: Laquita Lopez):  Gordon Mitchell MD    Procedure: Procedure(s):  LEFT KNEE DIAGNOSTIC ARTHROSCOPY, POSSIBLE MEDIAL MENISCUS REPAIR VERSUS PARTIAL MENISCECTOMY    Medications prior to admission:   Prior to Admission medications    Medication Sig Start Date End Date Taking? Authorizing Provider   metoprolol succinate (TOPROL XL) 100 MG extended release tablet Take 1 tablet by mouth daily  Patient taking differently: Take 100 mg by mouth at bedtime  21   Adelfa Dancer, MD   nitroGLYCERIN (NITROSTAT) 0.4 MG SL tablet up to max of 3 total doses. If no relief after 1 dose, call 911. 12/3/21   JULIA Murphy CNP   lisinopril (PRINIVIL;ZESTRIL) 40 MG tablet Take 1 tablet by mouth daily 21   JULIA Murphy CNP   amLODIPine (NORVASC) 5 MG tablet Take 2 tablets by mouth daily 21   JULIA Murphy CNP   atorvastatin (LIPITOR) 80 MG tablet Take 1 tablet by mouth nightly 11/3/21   JULIA Murphy CNP   Cholecalciferol 50 MCG (2000 UT) CAPS Vitamin D3 50 mcg (2,000 unit) capsule   Take 2 capsules every day by oral route. Historical Provider, MD   Cyanocobalamin (VITAMIN B 12) 100 MCG LOZG Take by mouth    Historical Provider, MD   DULoxetine (CYMBALTA) 60 MG extended release capsule Take 60 mg by mouth at bedtime     Historical Provider, MD   gabapentin (NEURONTIN) 400 MG capsule Take 400 mg by mouth 3 times daily.     Historical Provider, MD   omeprazole (PRILOSEC) 20 MG delayed release capsule Take 40 mg by mouth daily    Historical Provider, MD   aspirin 81 MG chewable tablet Take 81 mg by mouth daily    Historical Provider, MD       Current medications:    Current Facility-Administered Medications   Medication Dose Route Frequency Provider Last Rate Last Admin    ceFAZolin (ANCEF) 2000 mg in dextrose 5 % 50 mL IVPB  2,000 mg IntraVENous Once Tata Jones MD        gabapentin (NEURONTIN) capsule 300 mg  300 mg Oral On Call to Tutu Martínez MD        celecoxib (CELEBREX) capsule 400 mg  400 mg Oral On Call to [address], MD        acetaminophen (TYLENOL) tablet 1,000 mg  1,000 mg Oral Once Tata Jones MD        dexamethasone (PF) (DECADRON) injection 10 mg  10 mg IntraVENous Once Tata Jones MD        lactated ringers infusion   IntraVENous Continuous Yin Akins MD        sodium chloride flush 0.9 % injection 5-40 mL  5-40 mL IntraVENous 2 times per day Yin Akins MD        sodium chloride flush 0.9 % injection 5-40 mL  5-40 mL IntraVENous PRN Yin Akins MD        0.9 % sodium chloride infusion  25 mL IntraVENous PRN Yin Akins MD           Allergies: Allergies   Allergen Reactions    Codeine Nausea And Vomiting, Other (See Comments) and Nausea Only     Other reaction(s): Sweating, Sweating  Sweating   Other reaction(s): nausea, sweats  Other reaction(s): nausea, sweats  Other reactions and severities: 'Sweating, function (observable entity) - Moderate to severe'.          Problem List:    Patient Active Problem List   Diagnosis Code    Chest pain R07.9    Coronary artery disease of native artery with stable angina pectoris ([facility] [place] [address] I25.118    Hemiplegic migraine without status migrainosus, not intractable G43.409    Functional weakness R53.1    BMI 32.0-32.9,adult Z68.32    Hypertension I10    Hyperlipidemia E78.5    Left-sided weakness R53.1       Past Medical History:        Diagnosis Date    CAD (coronary artery disease)     Hemiplegic migraine without status migrainosus, not intractable 12/29/2021    Hyperlipidemia     Hypertension     Stroke [facility])     2012       Past Surgical History:        Procedure Laterality Date    ELBOW SURGERY Left     HERNIA REPAIR      KNEE SURGERY         Social History:    Social History     Tobacco Use    Smoking status: Never Smoker    Smokeless tobacco: Never Used   Substance Use Topics    Alcohol use: Never                                Counseling given: Not Answered      Vital Signs (Current):   Vitals:    03/24/22 1458 04/06/22 0752   BP:  (!) 134/100   Pulse:  73   Resp:  15   Temp:  98 °F (36.7 °C)   TempSrc:  Temporal   SpO2:  98%   Weight: 211 lb (95.7 kg) 209 lb 9.6 oz (95.1 kg)   Height: 5' 8\" (1.727 m) 5' 8\" (1.727 m)                                              BP Readings from Last 3 Encounters:   04/06/22 (!) 134/100   12/29/21 (!) 145/93   12/08/21 136/86       NPO Status:                                                                                 BMI:   Wt Readings from Last 3 Encounters:   04/06/22 209 lb 9.6 oz (95.1 kg)   12/29/21 213 lb 6.5 oz (96.8 kg)   12/08/21 216 lb 6.4 oz (98.2 kg)     Body mass index is 31.87 kg/m². CBC:   Lab Results   Component Value Date    WBC 9.6 12/29/2021    RBC 5.10 12/29/2021    HGB 15.6 12/29/2021    HCT 45.9 12/29/2021    MCV 90.1 12/29/2021    RDW 14.1 12/29/2021     12/29/2021       CMP:   Lab Results   Component Value Date     12/29/2021    K 4.2 12/29/2021     12/29/2021    CO2 25 12/29/2021    BUN 19 12/29/2021    CREATININE 1.0 12/29/2021    GFRAA >60 12/29/2021    AGRATIO 1.7 12/29/2021    LABGLOM >60 12/29/2021    GLUCOSE 121 12/29/2021    PROT 7.1 12/29/2021    CALCIUM 8.9 12/29/2021    BILITOT 0.4 12/29/2021    ALKPHOS 95 12/29/2021    AST 10 12/29/2021    ALT 22 12/29/2021       POC Tests: No results for input(s): POCGLU, POCNA, POCK, POCCL, POCBUN, POCHEMO, POCHCT in the last 72 hours.     Coags: No results found for: PROTIME, INR, APTT    HCG (If Applicable): No results found for: PREGTESTUR, PREGSERUM, HCG, HCGQUANT     ABGs: No results found for: PHART, PO2ART, KOA5QXJ, MRV6UYR, BEART, W5PWUPBQ     Type & Screen (If Applicable):  No results found for: LABABO, LABRH    Drug/Infectious Status (If Applicable):  No results found for: HIV, HEPCAB    COVID-19 Screening (If Applicable): No results found for: COVID19        Anesthesia Evaluation    Airway: Mallampati: II  TM distance: >3 FB   Neck ROM: full  Mouth opening: > = 3 FB Dental: normal exam         Pulmonary: breath sounds clear to auscultation  (+) sleep apnea: on CPAP,                             Cardiovascular:    (+) hypertension:, CAD: no interval change, hyperlipidemia    (-) CABG/stent, dysrhythmias,  angina and  CHF      Rhythm: regular  Rate: normal           Beta Blocker:  Not on Beta Blocker         Neuro/Psych:   (+) CVA (2015, weakness L side): residual symptoms, depression/anxiety             GI/Hepatic/Renal:        (-) GERD, hepatitis and liver disease       Endo/Other:    (+) no malignancy/cancer. (-) diabetes mellitus, hypothyroidism, no malignancy/cancer               Abdominal:             Vascular:     - DVT and PE. Other Findings:             Anesthesia Plan      general and regional     ASA 3       Induction: intravenous. MIPS: Prophylactic antiemetics administered. Anesthetic plan and risks discussed with patient. Plan discussed with CRNA.                   Ramila Monson MD   4/6/2022

## 2022-04-06 NOTE — PROGRESS NOTES
Patient admitted to PACU # 17 from OR at 1147 post LEFT KNEE DIAGNOSTIC ARTHROSCOPY, SYNOVECTOMY, PARTIAL MEDIAL MENISCECTOMY - Left   per Dr. Mercy Mata. Attached to PACU monitoring system and report received from anesthesia provider. Patient was reported to be hemodynamically stable during procedure. Patient drowsy on admission and denied pain. RLE surgical drsg c/d/i with ace wrap in place. Ice pack applied. Pt NSR on monitor. Pt on  2 NC. Will continue to monitor.

## 2022-04-06 NOTE — OP NOTE
Carlos Bowman MD  [place] Office: [address], [address], [address] [facility] [address] [phone]  : 1970  PCP: JULIA Lagos - CNP    Surgery Date: 2022        OPERATIVE REPORT    PRE-OP DIAGNOSIS: Peripheral tear of medial meniscus of left knee as current injury, initial encounter [S83.222A]       POST-OP DIAGNOSIS: Same    PROCEDURE: Left  Knee   Procedure(s):  LEFT KNEE DIAGNOSTIC ARTHROSCOPY,  SYNOVECTOMY,  PARTIAL MEDIAL MENISCECTOMY     SURGEON: Agnes Montoya MD    ASSISTANT:  Surgical Assistant: Valerie Turpin; Jovanna Dewitt , for the purposes of manipulation of the limb, and assistance with wound closure as needed. ANESTHESIA: General with adductor canal block    ANTIBIOTIC: CEFAZOLIN, DOSING PER NURSING CHART    ESTIMATED BLOOD LOSS:  11NZ    COMPLICATIONS:  None    FINDINGS:    - Patella: No significant wear  - Trochlea: No significant wear  - Tracking: Acceptable  - Medial compartment: grade 2a tibia and MFC  - Lateral compartment: grade 1b Tibia  - Medial Meniscus: small simple bird beak free flap tear  - Lateral Meniscus: Intact  - ACL: Intact  - PCL: Intact      IMPLANTS USED:   * No implants in log *    HISTORY OF PRESENT ILLNESS:  The patient is a(n) 49 y.o. male with a history of Peripheral tear of medial meniscus of left knee as current injury, initial encounter [S83.222A] who underwent multiple conservative therapies including anti-inflammatories, injections, activity modification, attempted weight loss, and use of assistive devices. MRI and x ray findings were discordant with respect to arthritis. Once all of these measures had failed surgical options were discussed. He has continued to experience an unacceptable level of symptoms, specifically pain refractory to conservative management, and wished to undergo surgical intervention as above. No guarantees were made or implied. Informed consent was obtained.      RISKS OF SURGERY:  An extensive conversation was held with the patient and/or family regarding the risks, benefits, potential complication and reasonable expectations of the planned procedure. Informed verbal consent was given under no distress. We had ample opportunities for questions regarding the usual outcomes. Risks specifically discussed, but not limited to the following, include possible: bleeding, infection, damage to blood vessels and/ornerves, blood clots, anesthesia complications, arthrofibrosis, re-injury, need for additional surgery, continued pain, and post-operative stiffness or looseness. In addition, in rare cases the patient may have loss of a limb or even death. No guarantee of any particular results were given. The patient voiced understanding that in some cases surgery can make them worse. In spite of this, the patient and/or family desired that we proceed with the operation and expressed clear understanding of these risks. DETAILS OF PROCEDURE: The patient was brought into the operating room, and placed in the supine position on the table. The operative extremity was prepped and draped in the standard sterile fashion. A timeout was performed in confirming the appropriate side, site, procedure, patient, implant availability and antibiotic administration. Limb was exsanguinated via esmarch and tourniquet was inflated to 250 mmHg for approximately 16 minutes. Standard inferolateral and inferomedial arthroscopy portals were established. Diagnostic arthroscopy was performed noting the findings listed above. Arthroscopic 2+ compartment synovectomy - CPT 88646  Synovectomy was performed was performed in medial and lateral compartments for adequate visualization and/or for removal of scar and synovitis using a shaver.      Chondroplasty - CPT 90678  MFC and medial tibia was noted to have grade 2a changes amenable to chondroplasty, which was performed using the shaver until stable borders were achieved. Partial Lateral Meniscectomy (includes chondroplasty) - Medial CPT 02459;    Medial meniscus was found to have a bird beak tear. Using a combination of biter and shaver, this was trimmed back to stable borders. Tourniquet was let down. Hemostasis was achieved using electrocautery. The wound was closed in the usual layered fashion. The patient tolerated the procedure well. Dressings were applied. At the end of the procedure the instrument count was correct. There were no complications. Transferred stable to the PACU in stable condition        KELLY Smith MD  [facility]  Office: [phone]  Cell: [phone]    04/06/22  11:32 AM

## 2022-04-06 NOTE — PROGRESS NOTES
PACU Transfer to [facility]    Vitals:    04/06/22 1230   BP: 126/88   Pulse: 81   Resp: 12   Temp: 96.9 °F (36.1 °C)   SpO2: 93%         Intake/Output Summary (Last 24 hours) at 4/6/2022 1237  Last data filed at 4/6/2022 1237  Gross per 24 hour   Intake 290 ml   Output 1 ml   Net 289 ml       Pain assessment:  none  Pain Level: 0    Patient transferred to care of [facility] RN.    4/6/2022 12:37 PM

## 2022-04-06 NOTE — H&P
[address] [facility] Update H & P  Department of General Surgery   Surgical Service   Pre-operative History and Physical  Last H & P within the last 30 days. DIAGNOSIS:   Peripheral tear of medial meniscus of left knee as current injury, initial encounter [S83.222A]    Procedure(s):  LEFT KNEE DIAGNOSTIC ARTHROSCOPY, POSSIBLE MEDIAL MENISCUS REPAIR VERSUS PARTIAL MENISCECTOMY     History obtained from: Patient interview and EHR     HISTORY OF PRESENT ILLNESS:   The patient is a 49 y.o. male with c/o left knee pain, swelling and intermittent locking in the setting of MRI demonstrated medial meniscus tear. Their symptoms have been recalcitrant to conservative treatment and the patient presents today for the above procedure. Illness Screening: Patient denies fever, chills, worsening cough, or close contact with sick individuals. Past Medical History:        Diagnosis Date    CAD (coronary artery disease)     Hemiplegic migraine without status migrainosus, not intractable 12/29/2021    Hyperlipidemia     Hypertension     Stroke [facility])     2012     Past Surgical History:        Procedure Laterality Date    ELBOW SURGERY Left     HERNIA REPAIR      KNEE SURGERY             Medications Prior to Admission:      Prior to Admission medications    Medication Sig Start Date End Date Taking? Authorizing Provider   metoprolol succinate (TOPROL XL) 100 MG extended release tablet Take 1 tablet by mouth daily  Patient taking differently: Take 100 mg by mouth at bedtime  12/8/21   Heather Pizarro MD   nitroGLYCERIN (NITROSTAT) 0.4 MG SL tablet up to max of 3 total doses.  If no relief after 1 dose, call 911. 12/3/21   Mavis Scale, APRN - CNP   lisinopril (PRINIVIL;ZESTRIL) 40 MG tablet Take 1 tablet by mouth daily 11/23/21   Mavis Scale, APRN - CNP   amLODIPine (NORVASC) 5 MG tablet Take 2 tablets by mouth daily 11/23/21   Mavis Scale, APRN - CNP atorvastatin (LIPITOR) 80 MG tablet Take 1 tablet by mouth nightly 11/3/21   JULIA Murphy - CNP   Cholecalciferol 50 MCG (2000 UT) CAPS Vitamin D3 50 mcg (2,000 unit) capsule   Take 2 capsules every day by oral route. Historical Provider, MD   Cyanocobalamin (VITAMIN B 12) 100 MCG LOZG Take by mouth    Historical Provider, MD   DULoxetine (CYMBALTA) 60 MG extended release capsule Take 60 mg by mouth at bedtime     Historical Provider, MD   gabapentin (NEURONTIN) 400 MG capsule Take 400 mg by mouth 3 times daily. Historical Provider, MD   omeprazole (PRILOSEC) 20 MG delayed release capsule Take 40 mg by mouth daily    Historical Provider, MD   aspirin 81 MG chewable tablet Take 81 mg by mouth daily    Historical Provider, MD         Allergies:  Codeine    PHYSICAL EXAM:      BP (!) 134/100   Pulse 73   Temp 98 °F (36.7 °C) (Temporal)   Resp 15   Ht 5' 8\" (1.727 m)   Wt 209 lb 9.6 oz (95.1 kg)   SpO2 98%   BMI 31.87 kg/m²      Airway:  Airway patent with no audible stridor    Heart:  Regular rate and rhythm, No murmur noted    Lungs:  No increased work of breathing, good air exchange, clear to auscultation bilaterally, no crackles or wheezing    Abdomen:  Soft, non-distended, non-tender, no masses palpated    ASSESSMENT AND PLAN    Patient is a 49 y.o. male with above specified procedure planned. 1.  The patients history and physical was obtained and signed off by the pre-admission testing department. Patient seen and focused exam done today- no new changes since last physical exam on 3/21/22    2. Access to ancillary services are available per request of the provider.     JULIA Arana CNP     4/6/2022

## 2022-04-06 NOTE — ANESTHESIA PROCEDURE NOTES
Peripheral Block    Patient location during procedure: PACU  Preanesthetic Checklist  Completed: patient identified, IV checked, site marked, risks and benefits discussed, surgical consent, monitors and equipment checked, pre-op evaluation, timeout performed, anesthesia consent given, oxygen available and patient being monitored  Peripheral Block  Patient position: supine  Prep: ChloraPrep  Patient monitoring: cardiac monitor, continuous pulse ox, frequent blood pressure checks and IV access  Block type: Femoral  Laterality: left  Injection technique: single-shot  Guidance: ultrasound guided  Local infiltration: lidocaine  Infiltration strength: 1 %  Adductor canal  Provider prep: mask and sterile gloves  Local infiltration: lidocaine  Needle  Needle type: combined needle/nerve stimulator   Needle gauge: 22 G  Needle length: 5 cm  Needle localization: ultrasound guidance  Assessment  Injection assessment: negative aspiration for heme, no paresthesia on injection and local visualized surrounding nerve on ultrasound  Slow fractionated injection: yes  Hemodynamics: stable  Additional Notes  Sartorius and Vastus Medialis Muscle, Femoral artery and Saphenous nerve are identified; the tip of the needle and the spread of the local anesthetic around the Saphenous nerve are visualized. The Saphenous nerve appeared to be anatomically normal and there were no abnormal pathologically findings seen.    Medications Administered  Ropivacaine (NAROPIN) 0.5% injection, 20 mL  Reason for block: post-op pain management and at surgeon's request

## 2022-05-26 RX ORDER — AMLODIPINE BESYLATE 5 MG/1
TABLET ORAL
Qty: 180 TABLET | OUTPATIENT
Start: 2022-05-26

## 2022-06-07 ENCOUNTER — APPOINTMENT (OUTPATIENT)
Dept: CT IMAGING | Age: 52
End: 2022-06-07
Payer: MEDICARE

## 2022-06-07 ENCOUNTER — HOSPITAL ENCOUNTER (EMERGENCY)
Age: 52
Discharge: HOME | End: 2022-06-07
Attending: EMERGENCY MEDICINE
Payer: MEDICARE

## 2022-06-07 VITALS
DIASTOLIC BLOOD PRESSURE: 85 MMHG | HEIGHT: 68 IN | BODY MASS INDEX: 31.83 KG/M2 | OXYGEN SATURATION: 96 % | TEMPERATURE: 98.5 F | HEART RATE: 89 BPM | SYSTOLIC BLOOD PRESSURE: 141 MMHG | RESPIRATION RATE: 20 BRPM | WEIGHT: 210 LBS

## 2022-06-07 DIAGNOSIS — R10.9 ABDOMINAL PAIN, UNSPECIFIED ABDOMINAL LOCATION: Primary | ICD-10-CM

## 2022-06-07 DIAGNOSIS — K62.5 RECTAL BLEED: ICD-10-CM

## 2022-06-07 LAB
ALBUMIN SERPL-MCNC: 4.9 G/DL (ref 3.4–5)
ALP BLD-CCNC: 112 U/L (ref 40–129)
ALT SERPL-CCNC: 29 U/L (ref 10–40)
ANION GAP SERPL CALCULATED.3IONS-SCNC: 14 MMOL/L (ref 3–16)
AST SERPL-CCNC: 20 U/L (ref 15–37)
BASOPHILS ABSOLUTE: 0.1 K/UL (ref 0–0.2)
BASOPHILS RELATIVE PERCENT: 1.2 %
BILIRUB SERPL-MCNC: 0.6 MG/DL (ref 0–1)
BILIRUBIN DIRECT: <0.2 MG/DL (ref 0–0.3)
BILIRUBIN, INDIRECT: NORMAL MG/DL (ref 0–1)
BUN BLDV-MCNC: 11 MG/DL (ref 7–20)
CALCIUM SERPL-MCNC: 9.2 MG/DL (ref 8.3–10.6)
CHLORIDE BLD-SCNC: 103 MMOL/L (ref 99–110)
CO2: 22 MMOL/L (ref 21–32)
CREAT SERPL-MCNC: 0.9 MG/DL (ref 0.9–1.3)
EOSINOPHILS ABSOLUTE: 0.2 K/UL (ref 0–0.6)
EOSINOPHILS RELATIVE PERCENT: 2.2 %
GFR AFRICAN AMERICAN: >60
GFR NON-AFRICAN AMERICAN: >60
GLUCOSE BLD-MCNC: 115 MG/DL (ref 70–99)
HCT VFR BLD CALC: 41.6 % (ref 40.5–52.5)
HCT VFR BLD CALC: 43.5 % (ref 40.5–52.5)
HEMOGLOBIN: 14.4 G/DL (ref 13.5–17.5)
HEMOGLOBIN: 14.8 G/DL (ref 13.5–17.5)
INR BLD: 0.95 (ref 0.87–1.14)
LIPASE: 20 U/L (ref 13–60)
LYMPHOCYTES ABSOLUTE: 1.8 K/UL (ref 1–5.1)
LYMPHOCYTES RELATIVE PERCENT: 21.9 %
MCH RBC QN AUTO: 30.1 PG (ref 26–34)
MCHC RBC AUTO-ENTMCNC: 34.6 G/DL (ref 31–36)
MCV RBC AUTO: 87.1 FL (ref 80–100)
MONOCYTES ABSOLUTE: 0.6 K/UL (ref 0–1.3)
MONOCYTES RELATIVE PERCENT: 6.9 %
NEUTROPHILS ABSOLUTE: 5.6 K/UL (ref 1.7–7.7)
NEUTROPHILS RELATIVE PERCENT: 67.8 %
PDW BLD-RTO: 13.5 % (ref 12.4–15.4)
PLATELET # BLD: 217 K/UL (ref 135–450)
PMV BLD AUTO: 7.9 FL (ref 5–10.5)
POTASSIUM SERPL-SCNC: 3.9 MMOL/L (ref 3.5–5.1)
PROTHROMBIN TIME: 12.6 SEC (ref 11.7–14.5)
RBC # BLD: 4.78 M/UL (ref 4.2–5.9)
SODIUM BLD-SCNC: 139 MMOL/L (ref 136–145)
TOTAL PROTEIN: 7.5 G/DL (ref 6.4–8.2)
WBC # BLD: 8.3 K/UL (ref 4–11)

## 2022-06-07 PROCEDURE — 74174 CTA ABD&PLVS W/CONTRAST: CPT

## 2022-06-07 PROCEDURE — 83690 ASSAY OF LIPASE: CPT

## 2022-06-07 PROCEDURE — 36415 COLL VENOUS BLD VENIPUNCTURE: CPT

## 2022-06-07 PROCEDURE — 80076 HEPATIC FUNCTION PANEL: CPT

## 2022-06-07 PROCEDURE — 80048 BASIC METABOLIC PNL TOTAL CA: CPT

## 2022-06-07 PROCEDURE — 85610 PROTHROMBIN TIME: CPT

## 2022-06-07 PROCEDURE — 85018 HEMOGLOBIN: CPT

## 2022-06-07 PROCEDURE — 99285 EMERGENCY DEPT VISIT HI MDM: CPT

## 2022-06-07 PROCEDURE — 6360000004 HC RX CONTRAST MEDICATION: Performed by: STUDENT IN AN ORGANIZED HEALTH CARE EDUCATION/TRAINING PROGRAM

## 2022-06-07 PROCEDURE — 2580000003 HC RX 258: Performed by: STUDENT IN AN ORGANIZED HEALTH CARE EDUCATION/TRAINING PROGRAM

## 2022-06-07 PROCEDURE — 85014 HEMATOCRIT: CPT

## 2022-06-07 PROCEDURE — 85025 COMPLETE CBC W/AUTO DIFF WBC: CPT

## 2022-06-07 RX ORDER — SODIUM CHLORIDE, SODIUM LACTATE, POTASSIUM CHLORIDE, AND CALCIUM CHLORIDE .6; .31; .03; .02 G/100ML; G/100ML; G/100ML; G/100ML
500 INJECTION, SOLUTION INTRAVENOUS ONCE
Status: COMPLETED | OUTPATIENT
Start: 2022-06-07 | End: 2022-06-07

## 2022-06-07 RX ADMIN — SODIUM CHLORIDE, POTASSIUM CHLORIDE, SODIUM LACTATE AND CALCIUM CHLORIDE 500 ML: 600; 310; 30; 20 INJECTION, SOLUTION INTRAVENOUS at 20:18

## 2022-06-07 RX ADMIN — IOPAMIDOL 80 ML: 755 INJECTION, SOLUTION INTRAVENOUS at 20:01

## 2022-06-07 ASSESSMENT — PAIN DESCRIPTION - DESCRIPTORS: DESCRIPTORS: CRAMPING

## 2022-06-07 ASSESSMENT — ENCOUNTER SYMPTOMS
ABDOMINAL PAIN: 1
VOMITING: 0
ABDOMINAL DISTENTION: 1
RECTAL PAIN: 0
CONSTIPATION: 0
DIARRHEA: 1
BLOOD IN STOOL: 1
NAUSEA: 0
SHORTNESS OF BREATH: 0
COUGH: 0
ANAL BLEEDING: 0

## 2022-06-07 ASSESSMENT — PAIN DESCRIPTION - LOCATION: LOCATION: ABDOMEN

## 2022-06-07 ASSESSMENT — PAIN SCALES - GENERAL: PAINLEVEL_OUTOF10: 6

## 2022-06-07 ASSESSMENT — PAIN DESCRIPTION - PAIN TYPE: TYPE: ACUTE PAIN

## 2022-06-07 ASSESSMENT — PAIN - FUNCTIONAL ASSESSMENT: PAIN_FUNCTIONAL_ASSESSMENT: 0-10

## 2022-06-07 ASSESSMENT — PAIN DESCRIPTION - FREQUENCY: FREQUENCY: CONTINUOUS

## 2022-06-07 ASSESSMENT — PAIN DESCRIPTION - ONSET: ONSET: ON-GOING

## 2022-06-07 NOTE — ED PROVIDER NOTES
Date of evaluation: 6/7/2022    Chief Complaint   Abdominal Pain (Pt reports abd cramping with blood mixed in his stool and black stool ) and Rectal Bleeding      Nursing Notes, Past Medical Hx, Past Surgical Hx, Social Hx,Allergies, and Family Hx were reviewed. History of Present Illness     Tear Galindo is a 49 y.o. male PMHx CAD, CVA, GERD, HTN who presents with abd pain and hematochezia. Reports he developed cramping lower abd pain with intermittent sharp pain worse on the R yesterday. This afternoon he had an episode of bright red blood as well as darker black-appearing blood in his stool. Also reports intermittent diarrhea over the past week. Reports decreased PO intake as well. Denies any sick contacts or anyone with similar sxs, no dietary changes recently. Takes ASA daily. Has had episode of blood in stool in the past but had colonoscopy which was negative and was diagnosed as hemorrhoidal bleeding. Review of Systems   Review of Systems   Constitutional: Negative for chills and fever. Respiratory: Negative for cough and shortness of breath. Cardiovascular: Negative for chest pain and leg swelling. Gastrointestinal: Positive for abdominal distention, abdominal pain, blood in stool and diarrhea. Negative for anal bleeding, constipation, nausea, rectal pain and vomiting. Genitourinary: Negative for decreased urine volume and dysuria. Neurological: Negative for dizziness, light-headedness and headaches.          Past Medical, Surgical, Family, and Social History         Diagnosis Date    CAD (coronary artery disease)     Hemiplegic migraine without status migrainosus, not intractable 12/29/2021    Hyperlipidemia     Hypertension     Stroke [facility])     2012         Procedure Laterality Date    ELBOW SURGERY Left     HERNIA REPAIR      KNEE ARTHROSCOPY Left 4/6/2022    LEFT KNEE DIAGNOSTIC ARTHROSCOPY,  SYNOVECTOMY,  PARTIAL MEDIAL MENISCECTOMY performed by Lor Mcconnell MD at [place] [address]       His family history includes Cancer in his father; Heart Attack in his mother. He reports that he has never smoked. He has never used smokeless tobacco. He reports that he does not drink alcohol and does not use drugs. Medications     Previous Medications    AMLODIPINE (NORVASC) 5 MG TABLET    Take 2 tablets by mouth daily    ASPIRIN 81 MG CHEWABLE TABLET    Take 1 tablet by mouth in the morning and at bedtime    ATORVASTATIN (LIPITOR) 80 MG TABLET    Take 1 tablet by mouth nightly    CHOLECALCIFEROL 50 MCG (2000 UT) CAPS    Vitamin D3 50 mcg (2,000 unit) capsule   Take 2 capsules every day by oral route. CYANOCOBALAMIN (VITAMIN B 12) 100 MCG LOZG    Take by mouth    DULOXETINE (CYMBALTA) 60 MG EXTENDED RELEASE CAPSULE    Take 60 mg by mouth at bedtime     GABAPENTIN (NEURONTIN) 400 MG CAPSULE    Take 400 mg by mouth 3 times daily. LISINOPRIL (PRINIVIL;ZESTRIL) 40 MG TABLET    Take 1 tablet by mouth daily    METOPROLOL SUCCINATE (TOPROL XL) 100 MG EXTENDED RELEASE TABLET    Take 1 tablet by mouth daily    NITROGLYCERIN (NITROSTAT) 0.4 MG SL TABLET    up to max of 3 total doses. If no relief after 1 dose, call 911. OMEPRAZOLE (PRILOSEC) 20 MG DELAYED RELEASE CAPSULE    Take 40 mg by mouth daily    ONDANSETRON (ZOFRAN-ODT) 4 MG DISINTEGRATING TABLET    Take 1 tablet by mouth every 8 hours as needed for Nausea or Vomiting    SENNA-DOCUSATE (PERICOLACE) 8.6-50 MG PER TABLET    Take 2 tablets by mouth daily as needed for Constipation       Allergies     He is allergic to codeine. Physical Exam     INITIAL VITALS: BP (!) 132/98   Pulse 100   Temp 98.5 °F (36.9 °C) (Oral)   Resp 22   Ht 5' 8\" (1.727 m)   Wt 210 lb (95.3 kg)   SpO2 97%   BMI 31.93 kg/m²      Physical Exam  Constitutional:       General: He is not in acute distress. Appearance: He is not ill-appearing. Cardiovascular:      Rate and Rhythm: Regular rhythm. Tachycardia present.       Heart sounds: Normal heart sounds. No murmur heard. No friction rub. No gallop. Pulmonary:      Effort: Pulmonary effort is normal. No respiratory distress. Breath sounds: Normal breath sounds. No wheezing, rhonchi or rales. Abdominal:      General: Abdomen is flat. Bowel sounds are normal. There is distension (mild). Palpations: Abdomen is soft. Tenderness: There is abdominal tenderness in the right lower quadrant. There is no guarding or rebound. Positive signs include McBurney's sign. Negative signs include Marte's sign and Rovsing's sign. Genitourinary:     Rectum: Normal. No mass or tenderness. Comments: No dahlia blood on rectal exam  Neurological:      General: No focal deficit present. Mental Status: He is alert and oriented to person, place, and time. Diagnostic Results       RADIOLOGY:  CTA ABDOMEN PELVIS W WO CONTRAST   Final Result      1. Normal CTA with no evidence of active GI bleed. No acute abnormality in the abdomen and pelvis. LABS:   Labs Reviewed   BASIC METABOLIC PANEL - Abnormal; Notable for the following components:       Result Value    Glucose 115 (*)     All other components within normal limits   CBC WITH AUTO DIFFERENTIAL   HEMOGLOBIN AND HEMATOCRIT   LIPASE   PROTIME-INR   HEPATIC FUNCTION PANEL   HEMOGLOBIN AND HEMATOCRIT   HEMOGLOBIN AND HEMATOCRIT   HEMOGLOBIN AND HEMATOCRIT       RECENT VITALS:  BP: (!) 132/98, Temp: 98.5 °F (36.9 °C), Heart Rate: 100,Resp: 22     Procedures     None    ED Course     The patient was given the following medications:  Orders Placed This Encounter   Medications    lactated ringers bolus    iopamidol (ISOVUE-370) 76 % injection 80 mL            CONSULTS:  None    MEDICAL DECISION MAKING     Maeve Renteria is a 49 y.o. male PMHx CAD on ASA presents with abd pain and heamtochezia. This appears to be LGI in origin, does not appear to be rectal bleeding given rectal exam and hx.  CT abd pel was negative for acute pathology including active bleed. Likely infectious gastroenteritis. Repeat H/H stable. Pt counseled to return if develops significant bleeding or signs of anemia. This patient was also evaluated by the attending physician. All care plans were discussed and agreed upon. Clinical Impression     Abdominal pain    Disposition/Plan     PATIENT REFERRED TO:  No follow-up provider specified.     DISCHARGE MEDICATIONS:  New Prescriptions    No medications on file       DISPOSITION Discharge - Pending Orders Complete 06/07/2022 09:11:48 PM       Cindy Garcia MD  Resident  06/07/22 [id number]       Ga Irwin MD  06/07/22 6069

## 2022-06-07 NOTE — ED PROVIDER NOTES
ED Attending Attestation Note     Date of evaluation: 6/7/2022    This patient was seen by the resident. I have seen and examined the patient, agree with the workup, evaluation, management and diagnosis. The care plan has been discussed. My assessment reveals a pleasant 50-year-old male with history of CAD, previous stroke and left-sided weakness and hypertension who presents to the emergency department with abdominal pain and bright red blood per rectum. Patient states he has been having right lower quadrant abdominal pain for approximately 1 day. This been associated with diarrhea including 1 episode of bright red blood with melena. He reports nausea and decreased appetite as well. He denies any fevers, chest pain, shortness of breath or cough. On examination I find a adult male, speaking in complete sentences. No increased work of breathing or accessory muscle use during respiration. Abdomen is soft, nondistended with focal areas of tenderness in the right lower quadrant including McBurney's point. Negative Rovsing sign. Negative psoas sign. Negative obturator sign. Please see resident documentation for rectal exam.    We will proceed with laboratory work-up and likely cross-sectional imaging for evaluation of intra-abdominal pathology. John Prieto MD MPH   Physician.         José Antonio Benjamin MD  06/07/22 1790

## 2022-08-23 RX ORDER — ATORVASTATIN CALCIUM 80 MG/1
TABLET, FILM COATED ORAL
Qty: 90 TABLET | Refills: 3 | OUTPATIENT
Start: 2022-08-23

## 2022-10-10 ENCOUNTER — HOSPITAL ENCOUNTER (OUTPATIENT)
Age: 52
Setting detail: OBSERVATION
Discharge: HOME | End: 2022-10-11
Attending: EMERGENCY MEDICINE
Payer: MEDICARE

## 2022-10-10 ENCOUNTER — APPOINTMENT (OUTPATIENT)
Dept: GENERAL RADIOLOGY | Age: 52
End: 2022-10-10
Payer: MEDICARE

## 2022-10-10 DIAGNOSIS — R07.9 CHEST PAIN, UNSPECIFIED TYPE: Primary | ICD-10-CM

## 2022-10-10 LAB
ANION GAP SERPL CALCULATED.3IONS-SCNC: 10 MMOL/L (ref 3–16)
BASOPHILS ABSOLUTE: 0.1 K/UL (ref 0–0.2)
BASOPHILS RELATIVE PERCENT: 1 %
BUN BLDV-MCNC: 10 MG/DL (ref 7–20)
CALCIUM SERPL-MCNC: 9 MG/DL (ref 8.3–10.6)
CHLORIDE BLD-SCNC: 100 MMOL/L (ref 99–110)
CO2: 26 MMOL/L (ref 21–32)
CREAT SERPL-MCNC: 0.9 MG/DL (ref 0.9–1.3)
EKG ATRIAL RATE: 87 BPM
EKG DIAGNOSIS: NORMAL
EKG P AXIS: 52 DEGREES
EKG P-R INTERVAL: 136 MS
EKG Q-T INTERVAL: 360 MS
EKG QRS DURATION: 78 MS
EKG QTC CALCULATION (BAZETT): 433 MS
EKG R AXIS: 23 DEGREES
EKG T AXIS: 46 DEGREES
EKG VENTRICULAR RATE: 87 BPM
EOSINOPHILS ABSOLUTE: 0.1 K/UL (ref 0–0.6)
EOSINOPHILS RELATIVE PERCENT: 2 %
GFR AFRICAN AMERICAN: >60
GFR NON-AFRICAN AMERICAN: >60
GLUCOSE BLD-MCNC: 94 MG/DL (ref 70–99)
HCT VFR BLD CALC: 39.4 % (ref 40.5–52.5)
HEMOGLOBIN: 13.5 G/DL (ref 13.5–17.5)
LYMPHOCYTES ABSOLUTE: 1.6 K/UL (ref 1–5.1)
LYMPHOCYTES RELATIVE PERCENT: 27.4 %
MCH RBC QN AUTO: 30.4 PG (ref 26–34)
MCHC RBC AUTO-ENTMCNC: 34.3 G/DL (ref 31–36)
MCV RBC AUTO: 88.6 FL (ref 80–100)
MONOCYTES ABSOLUTE: 0.6 K/UL (ref 0–1.3)
MONOCYTES RELATIVE PERCENT: 10.4 %
NEUTROPHILS ABSOLUTE: 3.5 K/UL (ref 1.7–7.7)
NEUTROPHILS RELATIVE PERCENT: 59.2 %
PDW BLD-RTO: 13.7 % (ref 12.4–15.4)
PLATELET # BLD: 202 K/UL (ref 135–450)
PMV BLD AUTO: 8.3 FL (ref 5–10.5)
POTASSIUM REFLEX MAGNESIUM: 3.7 MMOL/L (ref 3.5–5.1)
PRO-BNP: 6 PG/ML (ref 0–124)
RBC # BLD: 4.44 M/UL (ref 4.2–5.9)
SODIUM BLD-SCNC: 136 MMOL/L (ref 136–145)
TROPONIN: <0.01 NG/ML
WBC # BLD: 5.9 K/UL (ref 4–11)

## 2022-10-10 PROCEDURE — 85025 COMPLETE CBC W/AUTO DIFF WBC: CPT

## 2022-10-10 PROCEDURE — 6370000000 HC RX 637 (ALT 250 FOR IP): Performed by: INTERNAL MEDICINE

## 2022-10-10 PROCEDURE — 84484 ASSAY OF TROPONIN QUANT: CPT

## 2022-10-10 PROCEDURE — 99285 EMERGENCY DEPT VISIT HI MDM: CPT

## 2022-10-10 PROCEDURE — G0378 HOSPITAL OBSERVATION PER HR: HCPCS

## 2022-10-10 PROCEDURE — 96372 THER/PROPH/DIAG INJ SC/IM: CPT

## 2022-10-10 PROCEDURE — 83880 ASSAY OF NATRIURETIC PEPTIDE: CPT

## 2022-10-10 PROCEDURE — 2580000003 HC RX 258: Performed by: INTERNAL MEDICINE

## 2022-10-10 PROCEDURE — 36415 COLL VENOUS BLD VENIPUNCTURE: CPT

## 2022-10-10 PROCEDURE — 80048 BASIC METABOLIC PNL TOTAL CA: CPT

## 2022-10-10 PROCEDURE — 99284 EMERGENCY DEPT VISIT MOD MDM: CPT | Performed by: INTERNAL MEDICINE

## 2022-10-10 PROCEDURE — 71046 X-RAY EXAM CHEST 2 VIEWS: CPT

## 2022-10-10 PROCEDURE — 93005 ELECTROCARDIOGRAM TRACING: CPT | Performed by: EMERGENCY MEDICINE

## 2022-10-10 PROCEDURE — 6370000000 HC RX 637 (ALT 250 FOR IP): Performed by: PHYSICIAN ASSISTANT

## 2022-10-10 PROCEDURE — 6360000002 HC RX W HCPCS: Performed by: INTERNAL MEDICINE

## 2022-10-10 RX ORDER — PANTOPRAZOLE SODIUM 40 MG/1
40 TABLET, DELAYED RELEASE ORAL
Status: DISCONTINUED | OUTPATIENT
Start: 2022-10-11 | End: 2022-10-11 | Stop reason: HOSPADM

## 2022-10-10 RX ORDER — SODIUM CHLORIDE 0.9 % (FLUSH) 0.9 %
5-40 SYRINGE (ML) INJECTION EVERY 12 HOURS SCHEDULED
Status: DISCONTINUED | OUTPATIENT
Start: 2022-10-10 | End: 2022-10-11 | Stop reason: HOSPADM

## 2022-10-10 RX ORDER — NITROGLYCERIN 0.4 MG/1
0.4 TABLET SUBLINGUAL EVERY 5 MIN PRN
Status: DISCONTINUED | OUTPATIENT
Start: 2022-10-10 | End: 2022-10-11 | Stop reason: HOSPADM

## 2022-10-10 RX ORDER — LANOLIN ALCOHOL/MO/W.PET/CERES
250 CREAM (GRAM) TOPICAL DAILY
Status: DISCONTINUED | OUTPATIENT
Start: 2022-10-11 | End: 2022-10-11 | Stop reason: HOSPADM

## 2022-10-10 RX ORDER — SODIUM CHLORIDE 9 MG/ML
INJECTION, SOLUTION INTRAVENOUS PRN
Status: DISCONTINUED | OUTPATIENT
Start: 2022-10-10 | End: 2022-10-11 | Stop reason: HOSPADM

## 2022-10-10 RX ORDER — ACETAMINOPHEN 325 MG/1
650 TABLET ORAL EVERY 6 HOURS PRN
Status: DISCONTINUED | OUTPATIENT
Start: 2022-10-10 | End: 2022-10-11 | Stop reason: HOSPADM

## 2022-10-10 RX ORDER — METOPROLOL SUCCINATE 100 MG/1
100 TABLET, EXTENDED RELEASE ORAL NIGHTLY
Status: DISCONTINUED | OUTPATIENT
Start: 2022-10-10 | End: 2022-10-10

## 2022-10-10 RX ORDER — ONDANSETRON 4 MG/1
4 TABLET, ORALLY DISINTEGRATING ORAL EVERY 8 HOURS PRN
Status: DISCONTINUED | OUTPATIENT
Start: 2022-10-10 | End: 2022-10-11 | Stop reason: HOSPADM

## 2022-10-10 RX ORDER — ASPIRIN 81 MG/1
81 TABLET, CHEWABLE ORAL 2 TIMES DAILY
Status: DISCONTINUED | OUTPATIENT
Start: 2022-10-10 | End: 2022-10-11 | Stop reason: HOSPADM

## 2022-10-10 RX ORDER — GABAPENTIN 400 MG/1
400 CAPSULE ORAL 3 TIMES DAILY
Status: DISCONTINUED | OUTPATIENT
Start: 2022-10-10 | End: 2022-10-11 | Stop reason: HOSPADM

## 2022-10-10 RX ORDER — VITAMIN B COMPLEX
2000 TABLET ORAL
Status: DISCONTINUED | OUTPATIENT
Start: 2022-10-11 | End: 2022-10-11 | Stop reason: HOSPADM

## 2022-10-10 RX ORDER — METOPROLOL SUCCINATE 50 MG/1
50 TABLET, EXTENDED RELEASE ORAL NIGHTLY
Status: DISCONTINUED | OUTPATIENT
Start: 2022-10-10 | End: 2022-10-11 | Stop reason: HOSPADM

## 2022-10-10 RX ORDER — ENOXAPARIN SODIUM 100 MG/ML
40 INJECTION SUBCUTANEOUS DAILY
Status: DISCONTINUED | OUTPATIENT
Start: 2022-10-10 | End: 2022-10-11 | Stop reason: HOSPADM

## 2022-10-10 RX ORDER — AMLODIPINE BESYLATE 10 MG/1
10 TABLET ORAL DAILY
Status: DISCONTINUED | OUTPATIENT
Start: 2022-10-11 | End: 2022-10-11 | Stop reason: HOSPADM

## 2022-10-10 RX ORDER — NITROGLYCERIN 0.4 MG/1
0.4 TABLET SUBLINGUAL EVERY 5 MIN PRN
Status: DISCONTINUED | OUTPATIENT
Start: 2022-10-10 | End: 2022-10-10

## 2022-10-10 RX ORDER — MORPHINE SULFATE 2 MG/ML
2 INJECTION, SOLUTION INTRAMUSCULAR; INTRAVENOUS EVERY 4 HOURS PRN
Status: DISCONTINUED | OUTPATIENT
Start: 2022-10-10 | End: 2022-10-11 | Stop reason: HOSPADM

## 2022-10-10 RX ORDER — LISINOPRIL 40 MG/1
40 TABLET ORAL DAILY
Status: DISCONTINUED | OUTPATIENT
Start: 2022-10-11 | End: 2022-10-11 | Stop reason: HOSPADM

## 2022-10-10 RX ORDER — SODIUM CHLORIDE 0.9 % (FLUSH) 0.9 %
5-40 SYRINGE (ML) INJECTION PRN
Status: DISCONTINUED | OUTPATIENT
Start: 2022-10-10 | End: 2022-10-11 | Stop reason: HOSPADM

## 2022-10-10 RX ORDER — ONDANSETRON 2 MG/ML
4 INJECTION INTRAMUSCULAR; INTRAVENOUS EVERY 6 HOURS PRN
Status: DISCONTINUED | OUTPATIENT
Start: 2022-10-10 | End: 2022-10-11 | Stop reason: HOSPADM

## 2022-10-10 RX ORDER — ATORVASTATIN CALCIUM 80 MG/1
80 TABLET, FILM COATED ORAL NIGHTLY
Status: DISCONTINUED | OUTPATIENT
Start: 2022-10-10 | End: 2022-10-11 | Stop reason: HOSPADM

## 2022-10-10 RX ORDER — POLYETHYLENE GLYCOL 3350 17 G/17G
17 POWDER, FOR SOLUTION ORAL DAILY PRN
Status: DISCONTINUED | OUTPATIENT
Start: 2022-10-10 | End: 2022-10-11 | Stop reason: HOSPADM

## 2022-10-10 RX ORDER — ACETAMINOPHEN 650 MG/1
650 SUPPOSITORY RECTAL EVERY 6 HOURS PRN
Status: DISCONTINUED | OUTPATIENT
Start: 2022-10-10 | End: 2022-10-11 | Stop reason: HOSPADM

## 2022-10-10 RX ORDER — DULOXETIN HYDROCHLORIDE 60 MG/1
60 CAPSULE, DELAYED RELEASE ORAL NIGHTLY
Status: DISCONTINUED | OUTPATIENT
Start: 2022-10-10 | End: 2022-10-11 | Stop reason: HOSPADM

## 2022-10-10 RX ADMIN — ENOXAPARIN SODIUM 40 MG: 100 INJECTION SUBCUTANEOUS at 22:21

## 2022-10-10 RX ADMIN — ATORVASTATIN CALCIUM 80 MG: 80 TABLET, FILM COATED ORAL at 22:20

## 2022-10-10 RX ADMIN — METOPROLOL SUCCINATE 50 MG: 50 TABLET, EXTENDED RELEASE ORAL at 22:21

## 2022-10-10 RX ADMIN — DULOXETINE 60 MG: 60 CAPSULE, DELAYED RELEASE ORAL at 22:21

## 2022-10-10 RX ADMIN — NITROGLYCERIN 0.4 MG: 0.4 TABLET, ORALLY DISINTEGRATING SUBLINGUAL at 14:55

## 2022-10-10 RX ADMIN — Medication 10 ML: at 22:22

## 2022-10-10 RX ADMIN — GABAPENTIN 400 MG: 400 CAPSULE ORAL at 22:20

## 2022-10-10 ASSESSMENT — PAIN DESCRIPTION - PAIN TYPE: TYPE: ACUTE PAIN

## 2022-10-10 ASSESSMENT — PAIN - FUNCTIONAL ASSESSMENT
PAIN_FUNCTIONAL_ASSESSMENT: PREVENTS OR INTERFERES SOME ACTIVE ACTIVITIES AND ADLS
PAIN_FUNCTIONAL_ASSESSMENT: 0-10

## 2022-10-10 ASSESSMENT — PAIN DESCRIPTION - DESCRIPTORS
DESCRIPTORS: STABBING;SHARP;PRESSURE
DESCRIPTORS: STABBING;PRESSURE;SHARP

## 2022-10-10 ASSESSMENT — ENCOUNTER SYMPTOMS
EYE REDNESS: 0
ABDOMINAL PAIN: 0
SHORTNESS OF BREATH: 1

## 2022-10-10 ASSESSMENT — PAIN DESCRIPTION - ONSET: ONSET: SUDDEN

## 2022-10-10 ASSESSMENT — PAIN SCALES - GENERAL
PAINLEVEL_OUTOF10: 3
PAINLEVEL_OUTOF10: 6
PAINLEVEL_OUTOF10: 6

## 2022-10-10 ASSESSMENT — PAIN DESCRIPTION - LOCATION
LOCATION: CHEST
LOCATION: CHEST

## 2022-10-10 ASSESSMENT — PAIN DESCRIPTION - FREQUENCY: FREQUENCY: CONTINUOUS

## 2022-10-10 ASSESSMENT — PAIN DESCRIPTION - ORIENTATION
ORIENTATION: LEFT
ORIENTATION: LEFT

## 2022-10-10 NOTE — ED PROVIDER NOTES
[address] ENCOUNTER          PHYSICIAN ASSISTANT NOTE     Date of evaluation: 10/10/2022    Chief Complaint     Chest Pain (Approximately 20 min ago pta)    History of Present Illness     HPI: Monica Malone is a 47 y.o. male with history of CAD, HLD, HTN, CVA who presents to the emergency department with chest pain. Patient's chest pain started around 115 this afternoon. He was driving home from his in-laws house when this started. It is located in the left side of his chest, feels like a stabbing sensation as well as an elephant sitting on his chest with associated numbness into his left upper extremity and into his neck. He denies any associated shortness of breath, nausea or vomiting. He has had similar chest pains in the past for which he follows with Dr. Laxmi Menchaca, has been told that he has blockages though has never had stents placed. This chest pain is more severe as it is more intense and has been more constant. He received nitroglycerin in route which did help his pressure in his chest somewhat resolved. He also received aspirin in route. With the exception of the above, there are no aggravating or alleviating factors. Review of Systems     Review of Systems   Constitutional:  Negative for chills and fever. HENT:  Negative for congestion. Eyes:  Negative for redness. Respiratory:  Positive for shortness of breath. Cardiovascular:  Positive for chest pain. Gastrointestinal:  Negative for abdominal pain. Genitourinary:  Negative for dysuria, flank pain and frequency. Neurological:  Negative for dizziness and headaches. As stated above, all other systems reviewed and are otherwise negative. Past Medical, Surgical, Family, and Social History     He has a past medical history of CAD (coronary artery disease), Hemiplegic migraine without status migrainosus, not intractable, Hyperlipidemia, Hypertension, and Stroke ([facility] [address]   He has a past surgical history that includes knee surgery; hernia repair; Elbow surgery (Left); and Knee arthroscopy (Left, 4/6/2022). His family history includes Cancer in his father; Heart Attack in his mother. He reports that he has never smoked. He has never used smokeless tobacco. He reports that he does not drink alcohol and does not use drugs. Medications     Previous Medications    AMLODIPINE (NORVASC) 5 MG TABLET    Take 2 tablets by mouth daily    ASPIRIN 81 MG CHEWABLE TABLET    Take 1 tablet by mouth in the morning and at bedtime    ATORVASTATIN (LIPITOR) 80 MG TABLET    Take 1 tablet by mouth nightly    CHOLECALCIFEROL 50 MCG (2000 UT) CAPS    Vitamin D3 50 mcg (2,000 unit) capsule   Take 2 capsules every day by oral route. CYANOCOBALAMIN (VITAMIN B 12) 100 MCG LOZG    Take by mouth    DULOXETINE (CYMBALTA) 60 MG EXTENDED RELEASE CAPSULE    Take 60 mg by mouth at bedtime     GABAPENTIN (NEURONTIN) 400 MG CAPSULE    Take 400 mg by mouth 3 times daily. LISINOPRIL (PRINIVIL;ZESTRIL) 40 MG TABLET    Take 1 tablet by mouth daily    METOPROLOL SUCCINATE (TOPROL XL) 100 MG EXTENDED RELEASE TABLET    Take 1 tablet by mouth daily    NITROGLYCERIN (NITROSTAT) 0.4 MG SL TABLET    up to max of 3 total doses. If no relief after 1 dose, call 911. OMEPRAZOLE (PRILOSEC) 20 MG DELAYED RELEASE CAPSULE    Take 40 mg by mouth daily       Allergies     He is allergic to codeine. Physical Exam     INITIAL VITALS: BP: (!) 124/91, Temp: 97.5 °F (36.4 °C), Heart Rate: 95, Resp: 20, SpO2: 93 %  Physical Exam  Vitals and nursing note reviewed. Constitutional:       General: He is not in acute distress. Appearance: Normal appearance. He is normal weight. He is not ill-appearing, toxic-appearing or diaphoretic. HENT:      Head: Normocephalic and atraumatic.       Right Ear: External ear normal.      Left Ear: External ear normal.      Nose: Nose normal.      Mouth/Throat:      Mouth: Mucous membranes are moist.      Pharynx: Oropharynx is clear. Eyes:      Extraocular Movements: Extraocular movements intact. Conjunctiva/sclera: Conjunctivae normal.   Cardiovascular:      Rate and Rhythm: Normal rate and regular rhythm. Pulses: Normal pulses. Heart sounds: Normal heart sounds. No murmur heard. Pulmonary:      Effort: Pulmonary effort is normal. No respiratory distress. Breath sounds: No wheezing, rhonchi or rales. Abdominal:      General: Abdomen is flat. There is no distension. Palpations: Abdomen is soft. Tenderness: There is no abdominal tenderness. There is no guarding or rebound. Musculoskeletal:         General: Normal range of motion. Cervical back: Normal range of motion. Right lower leg: No edema. Left lower leg: No edema. Skin:     General: Skin is warm and dry. Neurological:      General: No focal deficit present. Mental Status: He is alert. Mental status is at baseline. Psychiatric:         Mood and Affect: Mood normal.         Behavior: Behavior normal.       Diagnostic Results     EKG   Interpreted in conjunction with emergency department physician Avelina Leahy MD  Rhythm: normal sinus   Rate: normal  Axis: normal  : none  Conduction: normal  ST Segments: no acute change  T Waves: no acute change  Q Waves:none  Clinical Impression: no acute changes  Comparison:  12/29/2021    RADIOLOGY:  XR CHEST (2 VW)   Final Result   1. No evidence of acute cardiopulmonary disease.            LABS:   Results for orders placed or performed during the hospital encounter of 10/10/22   CBC with Auto Differential   Result Value Ref Range    WBC 5.9 4.0 - 11.0 K/uL    RBC 4.44 4.20 - 5.90 M/uL    Hemoglobin 13.5 13.5 - 17.5 g/dL    Hematocrit 39.4 (L) 40.5 - 52.5 %    MCV 88.6 80.0 - 100.0 fL    MCH 30.4 26.0 - 34.0 pg    MCHC 34.3 31.0 - 36.0 g/dL    RDW 13.7 12.4 - 15.4 %    Platelets 033 626 - 493 K/uL    MPV 8.3 5.0 - 10.5 fL    Neutrophils % 59.2 %    Lymphocytes % 27.4 % Monocytes % 10.4 %    Eosinophils % 2.0 %    Basophils % 1.0 %    Neutrophils Absolute 3.5 1.7 - 7.7 K/uL    Lymphocytes Absolute 1.6 1.0 - 5.1 K/uL    Monocytes Absolute 0.6 0.0 - 1.3 K/uL    Eosinophils Absolute 0.1 0.0 - 0.6 K/uL    Basophils Absolute 0.1 0.0 - 0.2 K/uL   BMP w/ Reflex to MG   Result Value Ref Range    Sodium 136 136 - 145 mmol/L    Potassium reflex Magnesium 3.7 3.5 - 5.1 mmol/L    Chloride 100 99 - 110 mmol/L    CO2 26 21 - 32 mmol/L    Anion Gap 10 3 - 16    Glucose 94 70 - 99 mg/dL    BUN 10 7 - 20 mg/dL    Creatinine 0.9 0.9 - 1.3 mg/dL    GFR Non-African American >60 >60    GFR African American >60 >60    Calcium 9.0 8.3 - 10.6 mg/dL   Brain Natriuretic Peptide   Result Value Ref Range    Pro-BNP 6 0 - 124 pg/mL   Troponin   Result Value Ref Range    Troponin <0.01 <0.01 ng/mL   EKG 12 Lead   Result Value Ref Range    Ventricular Rate 87 BPM    Atrial Rate 87 BPM    P-R Interval 136 ms    QRS Duration 78 ms    Q-T Interval 360 ms    QTc Calculation (Bazett) 433 ms    P Axis 52 degrees    R Axis 23 degrees    T Axis 46 degrees    Diagnosis       EKG performed in ER and to be interpreted by ER physician. Confirmed by MD, ER ([id number]),  Mike Tran ([id number]) on 10/10/2022 4:18:18 PM       RECENT VITALS:  BP: 116/84, Temp: 97.5 °F (36.4 °C), Heart Rate: 78, Resp: 16, SpO2: 96 %     Procedures     N/a    ED Course     Nursing Notes, Past Medical Hx,Past Surgical Hx, Social Hx, Allergies, and Family Hx were reviewed.     The patient was given the following medications:  Orders Placed This Encounter   Medications    nitroGLYCERIN (NITROSTAT) SL tablet 0.4 mg       CONSULTS:  IP CONSULT TO CARDIOLOGY    MEDICAL DECISION MAKING / ASSESSMENT / PLAN     Vitals:    10/10/22 1408 10/10/22 1530 10/10/22 1600 10/10/22 1630   BP: (!) 124/91 118/88 117/84 116/84   Pulse: 95 80 77 78   Resp: 20 16 15 16   Temp: 97.5 °F (36.4 °C)      TempSrc: Oral      SpO2: 93% 93% 95% 96%   Weight: 219 lb 6.4 oz (99.5 kg) Height: 5' 8\" (1.727 m)          Tushar Brown is a 47 y.o. male who presents to the emergency department with chest pain. Patient was driving when he developed left-sided chest pain that he describes as both stabbing as well as an elephant sitting on his chest.  Has been constant in nature. He received nitroglycerin and aspirin in route which did help his pain. The patient has remained hemodynamically stable throughout their stay in the emergency department, and appears well overall. Patient did require additional nitroglycerin here for symptom control. EKG was nonischemic with a negative initial troponin. Her labs are reassuring with a chest x-ray. I did place a call to the patient's cardiologist, Dr. Olena Roche, unfortunately not hear back. We will likely plan for admission to the hospital and this is cardiologist plans back to determine need for intervention versus outpatient management of this patient. At this time I am going off service and will be signing out care of the patient to my colleague Inez More PA-C for further care. Cardiology recs vs admission is/are still pending. Oncoming provider responsibilities include following up on pending labs/tests, reassessing patient, and appropriate disposition. Please see medical record for further management and medical decision making. The patient was evaluated by myself and the ED Attending Physician, Dr. Rey Nair. All management and disposition plans were discussed and agreed upon. Clinical Impression     1. Chest pain, unspecified type        This note was dictated using voice-recognition software, which occasionally leads to inadvertent typographic errors. Disposition         DISPOSITION  - Pending likely admit.        Jay Hu, [id number]  10/10/22 6420

## 2022-10-10 NOTE — H&P
Hospital Medicine History & Physical      PCP: JULIA Aburto CNP    Date of Admission: 10/10/2022    Date of Service: Pt seen/examined on 10/10/2022 and  Placed in Observation. Chief Complaint:  chest pain      History Of Present Illness: The patient is a 47 y.o. male with medical history as below, most notable for previous CVA, HTN, HLD, also non obstructive CAD based on [facility] in 2021 who presents to [facility] with severe chest pain. Patient has been having on and off left sided chest pains, saw his primary cardiologist and underwent nuclear stress test which showed some fixed defect in anterior wall, deemed to be artifact. LHC in 2021 showed 40% stenosis of mid LAD. Today patient developed 10/10 left sided chest pain, felt like knife, radiating to left neck, jaw, arm, associated with some trouble breathing. Patient called EMS and was given nitroglycerine with improvement in his pain, with another additional nitro in our ER his pain is down to 3/10 and feels like light pressure at this time. Patient denies any trauma, strenuous physical activity or stress that provoked this episode and other times. Past Medical History:        Diagnosis Date    CAD (coronary artery disease)     Hemiplegic migraine without status migrainosus, not intractable 12/29/2021    Hyperlipidemia     Hypertension     Stroke [facility])     2012       Past Surgical History:        Procedure Laterality Date    ELBOW SURGERY Left     HERNIA REPAIR      KNEE ARTHROSCOPY Left 4/6/2022    LEFT KNEE DIAGNOSTIC ARTHROSCOPY,  SYNOVECTOMY,  PARTIAL MEDIAL MENISCECTOMY performed by Shima Boateng MD at [place] [id number]         Medications Prior to Admission:    Prior to Admission medications    Medication Sig Start Date End Date Taking?  Authorizing Provider   aspirin 81 MG chewable tablet Take 1 tablet by mouth in the morning and at bedtime 4/6/22   Shima Boateng MD   metoprolol succinate (TOPROL XL) 100 MG extended release tablet Take 1 tablet by mouth daily  Patient taking differently: Take 100 mg by mouth at bedtime 12/8/21   Shellie Chu MD   nitroGLYCERIN (NITROSTAT) 0.4 MG SL tablet up to max of 3 total doses. If no relief after 1 dose, call 911. 12/3/21   JULIA Patel CNP   lisinopril (PRINIVIL;ZESTRIL) 40 MG tablet Take 1 tablet by mouth daily 11/23/21   JULIA Patel CNP   amLODIPine (NORVASC) 5 MG tablet Take 2 tablets by mouth daily 11/23/21   JULIA Patel CNP   atorvastatin (LIPITOR) 80 MG tablet Take 1 tablet by mouth nightly 11/3/21   JULIA Patel CNP   Cholecalciferol 50 MCG (2000 UT) CAPS Vitamin D3 50 mcg (2,000 unit) capsule   Take 2 capsules every day by oral route. Historical Provider, MD   Cyanocobalamin (VITAMIN B 12) 100 MCG LOZG Take by mouth    Historical Provider, MD   DULoxetine (CYMBALTA) 60 MG extended release capsule Take 60 mg by mouth at bedtime     Historical Provider, MD   gabapentin (NEURONTIN) 400 MG capsule Take 400 mg by mouth 3 times daily. Historical Provider, MD   omeprazole (PRILOSEC) 20 MG delayed release capsule Take 40 mg by mouth daily    Historical Provider, MD       Allergies:  Codeine    Social History:  The patient currently lives at home    TOBACCO:   reports that he has never smoked. He has never used smokeless tobacco.  ETOH:   reports no history of alcohol use. Family History:  Reviewed in detail and Positive as follows:        Problem Relation Age of Onset    Heart Attack Mother     Cancer Father        REVIEW OF SYSTEMS:   Positive and negative as noted in the HPI. All other systems reviewed and negative. PHYSICAL EXAM:    /72   Pulse 79   Temp 97.5 °F (36.4 °C) (Oral)   Resp 17   Ht 5' 8\" (1.727 m)   Wt 219 lb 6.4 oz (99.5 kg)   SpO2 95%   BMI 33.36 kg/m²     General appearance: No apparent distress appears stated age and cooperative.   HEENT Normal cephalic, atraumatic without obvious deformity. Pupils equal, round, and reactive to light. Extra ocular muscles intact. Conjunctivae/corneas clear. Neck: Supple, No jugular venous distention/bruits. Trachea midline without thyromegaly or adenopathy with full range of motion. Lungs: Clear to auscultation, bilaterally without Rales/Wheezes/Rhonchi with good respiratory effort. Heart: Regular rate and rhythm with Normal S1/S2 without murmurs, rubs or gallops, point of maximum impulse non-displaced  Abdomen: Soft, non-tender or non-distended without rigidity or guarding and positive bowel sounds all four quadrants. Extremities: No clubbing, cyanosis, or edema bilaterally. Skin: Skin color, texture, turgor normal.    Neurologic: Alert and oriented X 3, grossly non-focal.  Mental status: Alert, oriented, thought content appropriate. Capillary refill is brisk  Peripheral pulses 2+    CXR:  I have reviewed the CXR with the following interpretation: no infiltrates  EKG:  I have reviewed the EKG with the following interpretation: sinus with flattened TW, no acute ST TW changes    CBC   Recent Labs     10/10/22  1456   WBC 5.9   HGB 13.5   HCT 39.4*         RENAL  Recent Labs     10/10/22  1456      K 3.7      CO2 26   BUN 10   CREATININE 0.9     LFT'S  No results for input(s): AST, ALT, ALB, BILIDIR, BILITOT, ALKPHOS in the last 72 hours. COAG  No results for input(s): INR in the last 72 hours.   CARDIAC ENZYMES  Recent Labs     10/10/22  1456 10/10/22  1633   TROPONINI <0.01 <0.01       U/A:  No results found for: NITRITE, COLORU, WBCUA, RBCUA, MUCUS, BACTERIA, CLARITYU, SPECGRAV, LEUKOCYTESUR, BLOODU, GLUCOSEU, AMORPHOUS    ABG  No results found for: GLC7GHK, BEART, D0JVTKJS, PHART, THGBART, DJA7GFK, PO2ART, XMK0LQF        Active Hospital Problems    Diagnosis Date Noted    Chest pain [R07.9] 08/30/2021         ASSESSMENT/PLAN:    Chest pain in a patient with known CAD:  Admit for cardiac CT angiogram or LHC as per cardiology recommendations  Troponin negative twice- will repeat once more   Telemetry monitoring  Cont asa, statin, metoprolol, lisinopril  Nitro as needed for chest pain of recurs. HTN:  Controlled with lisinopril and metoprolol    H/o CVA, CAD:  Statin, aspirin    CHRISTOPHER:  CPAP    DVT Prophylaxis: lovenox  Diet: No diet orders on file  Code Status: Prior  PT/OT Eval Status: at baseline    Dispo - obs       Ruthann Alexander MD    Thank you JULIA Jennings CNP for the opportunity to be involved in this patient's care. If you have any questions or concerns please feel free to contact me at [phone].

## 2022-10-10 NOTE — ED PROVIDER NOTES
[address] ENCOUNTER          PHYSICIAN ASSISTANT NOTE       Date of evaluation: 10/10/2022    Chief Complaint     Chest Pain (Approximately 20 min ago pta)        ADDENDUM:      Care of this patient was assumed from my colleague, Desiree Monae PA-C. The patient was seen for Chest Pain (Approximately 20 min ago pta)  . The patient's initial evaluation and plan have been discussed with the prior provider who initially evaluated the patient. Nursing Notes, Past Medical Hx, Past Surgical Hx, Social Hx, Allergies, and Family Hx were all reviewed. Physical Exam     INITIAL VITALS: BP: (!) 124/91, Temp: 97.5 °F (36.4 °C), Heart Rate: 95, Resp: 20, SpO2: 93 %     Nursing note and vitals reviewed. Physical Exam  Vitals and nursing note reviewed. Constitutional:       General: He is awake. He is not in acute distress. Appearance: He is well-developed. He is not ill-appearing or diaphoretic. HENT:      Head: Normocephalic and atraumatic. Nose: No congestion. Mouth/Throat:      Mouth: No injury. Eyes:      General: No visual field deficit. Pupils: Pupils are equal, round, and reactive to light. Neck:      Vascular: No JVD. Cardiovascular:      Rate and Rhythm: Normal rate. Pulmonary:      Effort: Pulmonary effort is normal.      Breath sounds: Normal breath sounds. No stridor. No wheezing, rhonchi or rales. Chest:      Chest wall: No tenderness. Abdominal:      General: There is no distension. Tenderness: There is no abdominal tenderness. There is no guarding or rebound. Musculoskeletal:         General: No tenderness. Cervical back: Full passive range of motion without pain, normal range of motion and neck supple. Skin:     General: Skin is warm and dry. Coloration: Skin is not pale. Neurological:      General: No focal deficit present. Mental Status: He is alert and oriented to person, place, and time.       GCS: GCS eye subscore is 4. GCS verbal subscore is 5. GCS motor subscore is 6. Cranial Nerves: No cranial nerve deficit, dysarthria or facial asymmetry. Motor: No weakness, abnormal muscle tone or pronator drift. Psychiatric:         Attention and Perception: Attention normal.         Mood and Affect: Mood and affect normal.         Speech: Speech normal.        Procedures     N/A    MEDICAL DECISION MAKING     Anne Cuba is admitted to the Emergency Department for evaluation of his chief complaint as described in the history of present illness. Complete history and physical was performed by me and my attending. Nursing notes, past medical history, surgical history, family history and social history were reviewed and addressed in the HPI. Tania Estrella is a 47 y.o. male who presents to the emergency department with a complaint of acute chest pain acute onset chest pain. The patient was previously evaluated by the prior emergency department team.  Please see their note for their assessment, evaluation, diagnostics and plan. Briefly, the patient presented to the emergency department foracute onset chest pain. Pain lasted approximately 20 minutes with significant pressure and a sharp element that radiates to the left shoulder, arm and left jaw. Received aspirin and 2 nitro by EMS prior to arrival.  Some improvement of pain with nitroglycerin. Has a history of coronary artery disease without stent placement. Had recently undergone nuclear medicine stress testing and echocardiogram, which were unremarkable. At the time of turnover, the patient was pending repeat troponin and cardiology consultation. His 90-minute troponin was unremarkable. The patient was seen by cardiology in the emergency department and determine the need for a CT coronary angiogram, which we are unable to perform tonight.   The patient will be admitted to the hospital for continued evaluation management, and CT coronary angiogram in the morning. The patient will be admitted to the hospitalist service for further evaluation management. I discussed this plan at length the patient who verbalizes understanding and is in agreement. The patient was seen and evaluated by myself and the attending physician, Zainab Botello MD who agrees with my assessment, treatment and plan. Clinical Impression     1. Chest pain, unspecified type        Disposition     DISPOSITION Decision To Admit 10/10/2022 05:48:24 PM    Cassi Virk. Martins, PA-HI  6:00 PM    Relevant History and Diagnostic Information     Past Medical, Surgical, Family, and Social History     He has a past medical history of CAD (coronary artery disease), Hemiplegic migraine without status migrainosus, not intractable, Hyperlipidemia, Hypertension, and Stroke ([facility] [address] He has a past surgical history that includes knee surgery; hernia repair; Elbow surgery (Left); and Knee arthroscopy (Left, 4/6/2022). His family history includes Cancer in his father; Heart Attack in his mother. He reports that he has never smoked. He has never used smokeless tobacco. He reports that he does not drink alcohol and does not use drugs. Medications     Previous Medications    AMLODIPINE (NORVASC) 5 MG TABLET    Take 2 tablets by mouth daily    ASPIRIN 81 MG CHEWABLE TABLET    Take 1 tablet by mouth in the morning and at bedtime    ATORVASTATIN (LIPITOR) 80 MG TABLET    Take 1 tablet by mouth nightly    CHOLECALCIFEROL 50 MCG (2000 UT) CAPS    Vitamin D3 50 mcg (2,000 unit) capsule   Take 2 capsules every day by oral route. CYANOCOBALAMIN (VITAMIN B 12) 100 MCG LOZG    Take by mouth    DULOXETINE (CYMBALTA) 60 MG EXTENDED RELEASE CAPSULE    Take 60 mg by mouth at bedtime     GABAPENTIN (NEURONTIN) 400 MG CAPSULE    Take 400 mg by mouth 3 times daily.     LISINOPRIL (PRINIVIL;ZESTRIL) 40 MG TABLET    Take 1 tablet by mouth daily    METOPROLOL SUCCINATE (TOPROL XL) 100 MG EXTENDED RELEASE TABLET    Take 1 tablet by mouth daily    NITROGLYCERIN (NITROSTAT) 0.4 MG SL TABLET    up to max of 3 total doses. If no relief after 1 dose, call 911. OMEPRAZOLE (PRILOSEC) 20 MG DELAYED RELEASE CAPSULE    Take 40 mg by mouth daily       Allergies     He is allergic to codeine. ED Course     Nursing Notes, Past Medical Hx, Past Surgical Hx, Social Hx,Allergies, and Family Hx were reviewed. Patient was given the following medications:  Orders Placed This Encounter   Medications    nitroGLYCERIN (NITROSTAT) SL tablet 0.4 mg       Diagnostic Results     EKG   Please see the prior providers note for interpretation of the patient's ED ECG. ED BEDSIDE ULTRASOUND:  N/A    RECENT VITALS:  BP: 111/72,Temp: 97.5 °F (36.4 °C), Heart Rate: 79, Resp: 17, SpO2: 95 %     RADIOLOGY:  XR CHEST (2 VW)   Final Result   1. No evidence of acute cardiopulmonary disease.        CTA CARDIAC W C STRC MORP W CONTRAST    (Results Pending)       LABS:   Results for orders placed or performed during the hospital encounter of 10/10/22   CBC with Auto Differential   Result Value Ref Range    WBC 5.9 4.0 - 11.0 K/uL    RBC 4.44 4.20 - 5.90 M/uL    Hemoglobin 13.5 13.5 - 17.5 g/dL    Hematocrit 39.4 (L) 40.5 - 52.5 %    MCV 88.6 80.0 - 100.0 fL    MCH 30.4 26.0 - 34.0 pg    MCHC 34.3 31.0 - 36.0 g/dL    RDW 13.7 12.4 - 15.4 %    Platelets 094 151 - 451 K/uL    MPV 8.3 5.0 - 10.5 fL    Neutrophils % 59.2 %    Lymphocytes % 27.4 %    Monocytes % 10.4 %    Eosinophils % 2.0 %    Basophils % 1.0 %    Neutrophils Absolute 3.5 1.7 - 7.7 K/uL    Lymphocytes Absolute 1.6 1.0 - 5.1 K/uL    Monocytes Absolute 0.6 0.0 - 1.3 K/uL    Eosinophils Absolute 0.1 0.0 - 0.6 K/uL    Basophils Absolute 0.1 0.0 - 0.2 K/uL   BMP w/ Reflex to MG   Result Value Ref Range    Sodium 136 136 - 145 mmol/L    Potassium reflex Magnesium 3.7 3.5 - 5.1 mmol/L    Chloride 100 99 - 110 mmol/L    CO2 26 21 - 32 mmol/L    Anion Gap 10 3 - 16    Glucose 94 70 - 99 mg/dL    BUN 10 7 - 20 mg/dL    Creatinine 0.9 0.9 - 1.3 mg/dL    GFR Non-African American >60 >60    GFR African American >60 >60    Calcium 9.0 8.3 - 10.6 mg/dL   Brain Natriuretic Peptide   Result Value Ref Range    Pro-BNP 6 0 - 124 pg/mL   Troponin   Result Value Ref Range    Troponin <0.01 <0.01 ng/mL   Troponin   Result Value Ref Range    Troponin <0.01 <0.01 ng/mL   EKG 12 Lead   Result Value Ref Range    Ventricular Rate 87 BPM    Atrial Rate 87 BPM    P-R Interval 136 ms    QRS Duration 78 ms    Q-T Interval 360 ms    QTc Calculation (Bazett) 433 ms    P Axis 52 degrees    R Axis 23 degrees    T Axis 46 degrees    Diagnosis       EKG performed in ER and to be interpreted by ER physician. Confirmed by MD, ER ([id number]),  Christie Aguirre ([id number]) on 10/10/2022 4:18:18 PM       CONSULTS:  [address] TO HOSPITALIST    PATIENT REFERRED TO:  No follow-up provider specified.     DISCHARGE MEDICATIONS:  New Prescriptions    No medications on file          Brandon Martínez  10/10/22 [id number]

## 2022-10-10 NOTE — ED PROVIDER NOTES
ED Attending Attestation Note     Date of evaluation: 10/10/2022    This patient was seen by the EPI. I have seen and examined the patient, agree with the workup, evaluation, management and diagnosis. The care plan has been discussed. I have reviewed the ECG and concur with the EPI's interpretation. I was present for any procedures performed in the EPI's note and have made edits to the note where appropriate. My assessment reveals 47 y.o. male with history of CAD, no stenting or bypass, presenting to the emergency department today with chest pain. He is alert, in no distress, vital signs reassuring. Heart regular rate and rhythm, respirations even and unlabored. EKG without acute ischemic changes.   He does have a negative stress test within the past month, will discuss with his cardiologist.       Cassidy Ponce MD  10/10/22 [id number]

## 2022-10-10 NOTE — CONSULTS
The [facility]    Cardiology Consult/H&P  Consulting Cardiologist Jey Minor MD , [facility]  Referring Provider:  JULIA Alva CNP    10/10/2022, 4:53 PM    Chief Complaint   Patient presents with    Chest Pain     Approximately 20 min ago pta      Primary Cardiologist:  Asked by No admitting provider for patient encounter./JULIA Marin CNP to evaluate his patient    Reason for consult chest pains    History of Present Illness:   Gerald Chambers is a 47 y.o. male is being seen in the emergency department where he presented with pain in his chest radiating to the left shoulder and the arm and the jaw that occurred about 20 minutes prior to admission. He is on medication with aspirin and nitroglycerin. Pain was  rated at a 6 out of 10 but is currently   almost completely gone. Cardiac cath on 8/31/2021 trivial coronary plaque mid LAD lesion of 40%. Normal LV function ejection fraction 60%. 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 Surgical History:   has a past surgical history that includes knee surgery; hernia repair; Elbow surgery (Left); and Knee arthroscopy (Left, 4/6/2022). Social History:   reports that he has never smoked. He has never used smokeless tobacco. He reports that he does not drink alcohol and does not use drugs. Family History:  family history includes Cancer in his father; Heart Attack in his mother. Home Medications:  Prior to Admission medications    Medication Sig Start Date End Date Taking?  Authorizing Provider   aspirin 81 MG chewable tablet Take 1 tablet by mouth in the morning and at bedtime 4/6/22   Mariam Yanez MD   metoprolol succinate (TOPROL XL) 100 MG extended release tablet Take 1 tablet by mouth daily  Patient taking differently: Take 100 mg by mouth at bedtime 12/8/21   Annette Guzman MD   nitroGLYCERIN (NITROSTAT) 0.4 MG SL tablet up to max of 3 total doses. If no relief after 1 dose, call 911. 12/3/21   JULIA Kate CNP   lisinopril (PRINIVIL;ZESTRIL) 40 MG tablet Take 1 tablet by mouth daily 11/23/21   JULIA Kate CNP   amLODIPine (NORVASC) 5 MG tablet Take 2 tablets by mouth daily 11/23/21   JULIA Kate CNP   atorvastatin (LIPITOR) 80 MG tablet Take 1 tablet by mouth nightly 11/3/21   JULIA Kate CNP   Cholecalciferol 50 MCG (2000 UT) CAPS Vitamin D3 50 mcg (2,000 unit) capsule   Take 2 capsules every day by oral route. Historical Provider, MD   Cyanocobalamin (VITAMIN B 12) 100 MCG LOZG Take by mouth    Historical Provider, MD   DULoxetine (CYMBALTA) 60 MG extended release capsule Take 60 mg by mouth at bedtime     Historical Provider, MD   gabapentin (NEURONTIN) 400 MG capsule Take 400 mg by mouth 3 times daily. Historical Provider, MD   omeprazole (PRILOSEC) 20 MG delayed release capsule Take 40 mg by mouth daily    Historical Provider, MD        Current Medications:  Current Facility-Administered Medications   Medication Dose Route Frequency Provider Last Rate Last Admin    nitroGLYCERIN (NITROSTAT) SL tablet 0.4 mg  0.4 mg SubLINGual Q5 Min PRN JESSE Schneider   0.4 mg at 10/10/22 2542     Current Outpatient Medications   Medication Sig Dispense Refill    aspirin 81 MG chewable tablet Take 1 tablet by mouth in the morning and at bedtime 30 tablet 3    metoprolol succinate (TOPROL XL) 100 MG extended release tablet Take 1 tablet by mouth daily (Patient taking differently: Take 100 mg by mouth at bedtime) 90 tablet 3    nitroGLYCERIN (NITROSTAT) 0.4 MG SL tablet up to max of 3 total doses.  If no relief after 1 dose, call 911. 25 tablet 3    lisinopril (PRINIVIL;ZESTRIL) 40 MG tablet Take 1 tablet by mouth daily 90 tablet 3 amLODIPine (NORVASC) 5 MG tablet Take 2 tablets by mouth daily 90 tablet 3    atorvastatin (LIPITOR) 80 MG tablet Take 1 tablet by mouth nightly 90 tablet 3    Cholecalciferol 50 MCG (2000 UT) CAPS Vitamin D3 50 mcg (2,000 unit) capsule   Take 2 capsules every day by oral route. Cyanocobalamin (VITAMIN B 12) 100 MCG LOZG Take by mouth      DULoxetine (CYMBALTA) 60 MG extended release capsule Take 60 mg by mouth at bedtime       gabapentin (NEURONTIN) 400 MG capsule Take 400 mg by mouth 3 times daily. omeprazole (PRILOSEC) 20 MG delayed release capsule Take 40 mg by mouth daily         Allergies:  Codeine     Review of Systems:   Constitutional: there has been no unanticipated weight loss. Eyes: No visual changes or diplopia. No scleral icterus. ENT: No Headaches, hearing loss or vertigo. No mouth sores or sore throat. Cardiovascular: Reviewed in HPI   Pulmonary:  no cough or sputum production. Gastrointestinal: No abdominal pain, appetite loss, blood in stools. No change in bowel or bladder habits. Genitourinary: No dysuria, trouble voiding, or hematuria. Musculoskeletal:  No gait disturbance, weakness or joint complaints. Integumentary: No rash or pruritis. Endocrine: No malaise, fatigue or temperature intolerance. Hematologic/Lymphatic: No abnormal bruising or bleeding, blood clots or swollen lymph nodes. Allergic/Immunologic: No nasal congestion or hives. All other ROS are reviewed and are unremarkable. Physical Examination:    Vitals:    10/10/22 1408 10/10/22 1530 10/10/22 1600 10/10/22 1630   BP: (!) 124/91 118/88 117/84 116/84   Pulse: 95 80 77 78   Resp: 20 16 15 16   Temp: 97.5 °F (36.4 °C)      TempSrc: Oral      SpO2: 93% 93% 95% 96%   Weight: 219 lb 6.4 oz (99.5 kg)      Height: 5' 8\" (1.727 m)           EXAMl and General Appearance:  Healthy. And alert . HEENT: eyes and ears intact.  No nasal masses  THYROID: not enlarged  LUNGS:  Clear to auscultation and percussion  HEART and VASCULAR:  The apical impulses not displaced  Heart tones are crisp and normal  Cervical veins are not engorged  The carotid upstroke is normal in amplitude and contour without delay or bruit  Peripheral pulses are symmetrical and full  There is no clubbing, cyanosis of the extremities. No peripheral edema  Femoral Arteries: 2+ and equal  Pedal Pulses:2+ and equal   ABDOMEN[de-identified]  No masses or tenderness  Liver/Spleen: No Abnormalities Noted  NEUROLOGICAL:  . Moves all extremities to command. Cranial nerves 2-12 are in tact.   PSYCHIATRIC: alert and lucid  and oriented and appropriate  SKIN: No lesions or rashes  LYMPH NODES: none enlarged            CBC with Differential:    Lab Results   Component Value Date/Time    WBC 5.9 10/10/2022 02:56 PM    RBC 4.44 10/10/2022 02:56 PM    HGB 13.5 10/10/2022 02:56 PM    HCT 39.4 10/10/2022 02:56 PM     10/10/2022 02:56 PM    MCV 88.6 10/10/2022 02:56 PM    MCH 30.4 10/10/2022 02:56 PM    MCHC 34.3 10/10/2022 02:56 PM    RDW 13.7 10/10/2022 02:56 PM    LYMPHOPCT 27.4 10/10/2022 02:56 PM    MONOPCT 10.4 10/10/2022 02:56 PM    BASOPCT 1.0 10/10/2022 02:56 PM    MONOSABS 0.6 10/10/2022 02:56 PM    LYMPHSABS 1.6 10/10/2022 02:56 PM    EOSABS 0.1 10/10/2022 02:56 PM    BASOSABS 0.1 10/10/2022 02:56 PM     BMP:    Lab Results   Component Value Date/Time     10/10/2022 02:56 PM    K 3.7 10/10/2022 02:56 PM     10/10/2022 02:56 PM    CO2 26 10/10/2022 02:56 PM    BUN 10 10/10/2022 02:56 PM    LABALBU 4.9 06/07/2022 08:55 PM    CREATININE 0.9 10/10/2022 02:56 PM    CALCIUM 9.0 10/10/2022 02:56 PM    GFRAA >60 10/10/2022 02:56 PM    LABGLOM >60 10/10/2022 02:56 PM     Uric Acid:  No components found for: URIC  PT/INR:    Lab Results   Component Value Date/Time    PROTIME 12.6 06/07/2022 08:10 PM    INR 0.95 06/07/2022 08:10 PM     Last 3 Troponin:    Lab Results   Component Value Date/Time    TROPONINI <0.01 10/10/2022 02:56 PM    TROPONINI <0.01 12/29/2021 01:24 PM    TROPONINI <0.01 09/01/2021 04:24 AM     FLP:    Lab Results   Component Value Date/Time    TRIG 148 08/31/2021 03:28 PM    HDL 37 08/31/2021 03:28 PM    LDLCALC 83 08/31/2021 03:28 PM    LABVLDL 30 08/31/2021 03:28 PM       EKG: EKG on 10/10/2022 sinus rhythm at 87/min. Essentially normal study. Jodi Frost echocardiogram pending  Assessment/ Plan     Patient Active Problem List    Diagnosis Date Noted    Hemiplegic migraine without status migrainosus, not intractable 12/29/2021    Functional weakness 12/29/2021    BMI 32.0-32.9,adult 12/29/2021    Hypertension     Hyperlipidemia     Left-sided weakness     Coronary artery disease of native artery with stable angina pectoris ([facility] [address] 09/01/2021    Chest pain 08/30/2021   Echocardiogram  Fasting lipid profile  Consider cardiac cath tomorrow or coronary CT angiogram Possible cath tomorrow     Thanks for allowing me the opportunity  to participate in the evaluation and care of your patients.  Please call if we can assist further [phone]    This chart was likely completed using voice recognition technology and may contain unintended grammatical , phraseology,and/or punctuation errors  Keisha Hebert MD , [address] [facility]  10/10/01335:53 PM

## 2022-10-10 NOTE — ED TRIAGE NOTES
Pt presents with complaints of chest pain radiating to left shoulder, arm, and jaw approximately 20 minutes pta. Pt medicated with ASA and 2 nitro pta. Pain level 10/10 pta with current pain level rated at 6/10. Describes pain as constant pressure with periods of sharp and stabbing pain. Pt reports hx of coronary blockage and stroke. EKG obtained and placed on cardiac monitor with NSR observed. Endorses \"slight sob. \" VSS. Pt also reports that he had a stress test last month on 9/28.

## 2022-10-11 ENCOUNTER — APPOINTMENT (OUTPATIENT)
Dept: CT IMAGING | Age: 52
End: 2022-10-11
Payer: MEDICARE

## 2022-10-11 VITALS
DIASTOLIC BLOOD PRESSURE: 92 MMHG | WEIGHT: 212.3 LBS | BODY MASS INDEX: 32.18 KG/M2 | TEMPERATURE: 98.6 F | HEART RATE: 71 BPM | HEIGHT: 68 IN | SYSTOLIC BLOOD PRESSURE: 134 MMHG | RESPIRATION RATE: 18 BRPM | OXYGEN SATURATION: 96 %

## 2022-10-11 PROBLEM — I25.10 CORONARY ARTERY DISEASE DUE TO LIPID RICH PLAQUE: Status: ACTIVE | Noted: 2021-09-01

## 2022-10-11 PROBLEM — I25.83 CORONARY ARTERY DISEASE DUE TO LIPID RICH PLAQUE: Status: ACTIVE | Noted: 2021-09-01

## 2022-10-11 LAB
ANION GAP SERPL CALCULATED.3IONS-SCNC: 11 MMOL/L (ref 3–16)
BASOPHILS ABSOLUTE: 0.1 K/UL (ref 0–0.2)
BASOPHILS RELATIVE PERCENT: 1.2 %
BUN BLDV-MCNC: 11 MG/DL (ref 7–20)
CALCIUM SERPL-MCNC: 9.2 MG/DL (ref 8.3–10.6)
CHLORIDE BLD-SCNC: 102 MMOL/L (ref 99–110)
CO2: 27 MMOL/L (ref 21–32)
CREAT SERPL-MCNC: 0.9 MG/DL (ref 0.9–1.3)
D DIMER: <0.27 UG/ML FEU (ref 0–0.6)
EOSINOPHILS ABSOLUTE: 0.1 K/UL (ref 0–0.6)
EOSINOPHILS RELATIVE PERCENT: 2 %
GFR AFRICAN AMERICAN: >60
GFR NON-AFRICAN AMERICAN: >60
GLUCOSE BLD-MCNC: 115 MG/DL (ref 70–99)
HCT VFR BLD CALC: 41.8 % (ref 40.5–52.5)
HEMOGLOBIN: 14.1 G/DL (ref 13.5–17.5)
LYMPHOCYTES ABSOLUTE: 1.3 K/UL (ref 1–5.1)
LYMPHOCYTES RELATIVE PERCENT: 24.7 %
MCH RBC QN AUTO: 29.8 PG (ref 26–34)
MCHC RBC AUTO-ENTMCNC: 33.8 G/DL (ref 31–36)
MCV RBC AUTO: 88.3 FL (ref 80–100)
MONOCYTES ABSOLUTE: 0.4 K/UL (ref 0–1.3)
MONOCYTES RELATIVE PERCENT: 7.1 %
NEUTROPHILS ABSOLUTE: 3.5 K/UL (ref 1.7–7.7)
NEUTROPHILS RELATIVE PERCENT: 65 %
PDW BLD-RTO: 13.9 % (ref 12.4–15.4)
PLATELET # BLD: 190 K/UL (ref 135–450)
PMV BLD AUTO: 8 FL (ref 5–10.5)
POTASSIUM REFLEX MAGNESIUM: 4.1 MMOL/L (ref 3.5–5.1)
RBC # BLD: 4.74 M/UL (ref 4.2–5.9)
SODIUM BLD-SCNC: 140 MMOL/L (ref 136–145)
TROPONIN: <0.01 NG/ML
WBC # BLD: 5.4 K/UL (ref 4–11)

## 2022-10-11 PROCEDURE — 99215 OFFICE O/P EST HI 40 MIN: CPT | Performed by: NURSE PRACTITIONER

## 2022-10-11 PROCEDURE — 6370000000 HC RX 637 (ALT 250 FOR IP): Performed by: INTERNAL MEDICINE

## 2022-10-11 PROCEDURE — G0378 HOSPITAL OBSERVATION PER HR: HCPCS

## 2022-10-11 PROCEDURE — 80048 BASIC METABOLIC PNL TOTAL CA: CPT

## 2022-10-11 PROCEDURE — 99215 OFFICE O/P EST HI 40 MIN: CPT | Performed by: INTERNAL MEDICINE

## 2022-10-11 PROCEDURE — 84484 ASSAY OF TROPONIN QUANT: CPT

## 2022-10-11 PROCEDURE — 85025 COMPLETE CBC W/AUTO DIFF WBC: CPT

## 2022-10-11 PROCEDURE — 75574 CT ANGIO HRT W/3D IMAGE: CPT

## 2022-10-11 PROCEDURE — 2580000003 HC RX 258: Performed by: INTERNAL MEDICINE

## 2022-10-11 PROCEDURE — 6360000004 HC RX CONTRAST MEDICATION: Performed by: INTERNAL MEDICINE

## 2022-10-11 PROCEDURE — 85379 FIBRIN DEGRADATION QUANT: CPT

## 2022-10-11 PROCEDURE — 36415 COLL VENOUS BLD VENIPUNCTURE: CPT

## 2022-10-11 RX ADMIN — AMLODIPINE BESYLATE 10 MG: 10 TABLET ORAL at 14:55

## 2022-10-11 RX ADMIN — Medication 10 ML: at 14:58

## 2022-10-11 RX ADMIN — CYANOCOBALAMIN TAB 1000 MCG 250 MCG: 1000 TAB at 14:54

## 2022-10-11 RX ADMIN — GABAPENTIN 400 MG: 400 CAPSULE ORAL at 14:54

## 2022-10-11 RX ADMIN — Medication 2000 UNITS: at 14:55

## 2022-10-11 RX ADMIN — IOPAMIDOL 80 ML: 755 INJECTION, SOLUTION INTRAVENOUS at 10:48

## 2022-10-11 RX ADMIN — ASPIRIN 81 MG 81 MG: 81 TABLET ORAL at 14:55

## 2022-10-11 RX ADMIN — LISINOPRIL 40 MG: 40 TABLET ORAL at 14:55

## 2022-10-11 RX ADMIN — PANTOPRAZOLE SODIUM 40 MG: 40 TABLET, DELAYED RELEASE ORAL at 06:04

## 2022-10-11 NOTE — PROGRESS NOTES
Patient admitted to 1. Alert and oriented x 4. Vitals stable, afebrile. Denies pain, c/o mild discomfort in chest but tolerable. Admission and 4 eye skin assessment complete. HS meds administered. Patient does not meet fall criteria, gait steady with cane, no recent fall history. NPO after midnight for CT angiogram, patient aware. Call light reviewed and in reach. Urinal at bedside. Patient encouraged to call with needs and concerns.

## 2022-10-11 NOTE — PLAN OF CARE
Problem: Cardiovascular - Adult  Goal: Maintains optimal cardiac output and hemodynamic stability  Outcome: Progressing   Vitals stable, afebrile. C/o mild discomfort in chest but tolerable. SR on tele. Problem: Skin/Tissue Integrity - Adult  Goal: Skin integrity remains intact  Outcome: Progressing   Skin intact. Patient repositions self. Problem: Musculoskeletal - Adult  Goal: Return mobility to safest level of function  Outcome: Progressing   Up independently with cane. Patient completes all adl's independently.

## 2022-10-11 NOTE — PROGRESS NOTES
4 Eyes Admission Assessment     I agree as the admission nurse that 2 RN's have performed a thorough Head to Toe Skin Assessment on the patient. ALL assessment sites listed below have been assessed on admission. Areas assessed by both nurses:   [x]   Head, Face, and Ears   [x]   Shoulders, Back, and Chest  [x]   Arms, Elbows, and Hands   [x]   Coccyx, Sacrum, and Ischium  [x]   Legs, Feet, and Heels        Does the Patient have Skin Breakdown?   No         Vladimir Prevention initiated:  No   Wound Care Orders initiated:  No      [facility] nurse consulted for Pressure Injury (Stage 3,4, Unstageable, DTI, NWPT, and Complex wounds) or Vladimir score 18 or lower:  No      Nurse 1 eSignature: Electronically signed by Pedro Gonzales RN on 10/10/22 at 8:55 PM EDT    **SHARE this note so that the co-signing nurse is able to place an eSignature**    Nurse 2 eSignature: {Esignature:[id number]}

## 2022-10-11 NOTE — DISCHARGE INSTRUCTIONS
Call Dr. Sween[facility] office tomorrow to make a follow up appointment  ECHO will be done outpatient  Return to ED if chest pain returns

## 2022-10-11 NOTE — PROGRESS NOTES
[facility]   Cardiology  Note   Dr Radha Christensen MD, Francisco Yang RN, FNP APRN CVNP  Date: 10/11/2022  Admit Date: 10/10/2022       CC:henry  PMH: CVA  HTN HLD CHRISTOPHER     Interval Hx /  Subjective: Today, leaving for CTA cardiac   No new complaints today. No major events overnight. Denies having chest pain, palpitations, shortness of breath, orthopnea/PND, cough, or dizziness at the time of this visit. 10/11/2022 CTA cardiac   1. Right coronary artery dominance. 2. Mild mixed calcific and soft plaque in the LAD with minimal stenosis less than 25% in the mid LAD. 3. Mild soft plaque in the proximal left circumflex coronary artery with minimal stenosis less than 25%. 4. Total calcium score 128. CAD RADS 1, minimal nonobstructive coronary artery disease. Patient seen and examined. Clinical notes reviewed.  Telemetry reviewed Pertinent labs, diagnostic, device, and imaging results reviewed as a part of this visit  I spent a total of 35 minutes and greater than 50% of the time was spent counseling with patient  coordinating care regarding her diagnosis, treatments and plan of care    Scheduled Meds:   amLODIPine  10 mg Oral Daily    aspirin  81 mg Oral BID    atorvastatin  80 mg Oral Nightly    Vitamin D  2,000 Units Oral Daily with breakfast    vitamin B-12  250 mcg Oral Daily    DULoxetine  60 mg Oral Nightly    gabapentin  400 mg Oral TID    lisinopril  40 mg Oral Daily    pantoprazole  40 mg Oral QAM AC    sodium chloride flush  5-40 mL IntraVENous 2 times per day    enoxaparin  40 mg SubCUTAneous Daily    metoprolol succinate  50 mg Oral Nightly     Vitals:    10/11/22 1029   BP: (!) 150/100   Pulse: 68   Resp:    Temp:    SpO2:       In: 510 [P.O.:510]  Out: 550    Wt Readings from Last 3 Encounters:   10/11/22 212 lb 4.8 oz (96.3 kg)   06/07/22 210 lb (95.3 kg)   04/06/22 209 lb 9.6 oz (95.1 kg)       Intake/Output Summary (Last 24 hours) at 10/11/2022 7567  Last data filed at 10/11/2022 0935  Gross per 24 hour   Intake 510 ml   Output 550 ml   Net -40 ml       Telemetry: Personally Reviewed    Constitutional: Cooperative and in no apparent distress, and appears well nourished  Skin: Warm and pink; no pallor, cyanosis, clubbing, or bruising   HEENT: Symmetric and normocephalic. Cardiovascular: Regular rate and rhythm. Respiratory: Respirations symmetric and unlabored. Lungs clear to auscultation bilaterally, no wheezing, crackles, or rhonchi  Gastrointestinal: Abdomen soft and round. Bowel sounds normoactive without tenderness or masses. Musculoskeletal: Bilateral upper and lower extremity strength 5/5 with full ROM  Neurologic/Psych: Awake and orientated to person, place and time. Calm affect, appropriate mood    Patient Active Problem List    Diagnosis Date Noted    Hemiplegic migraine without status migrainosus, not intractable 12/29/2021    Functional weakness 12/29/2021    BMI 32.0-32.9,adult 12/29/2021    Hypertension     Hyperlipidemia     Left-sided weakness     Coronary artery disease of native artery with stable angina pectoris ([address] 09/01/2021    Chest pain 08/30/2021      Assessment     cp    10/11/2022 CTA cardiac   1. Right coronary artery dominance. 2. Mild mixed calcific and soft plaque in the LAD with minimal stenosis less than 25% in the mid LAD. 3. Mild soft plaque in the proximal left circumflex coronary artery with minimal stenosis less than 25%. 4. Total calcium score 128. CAD RADS 1, minimal nonobstructive coronary artery disease. CVA      HTN   On Norvasc lisinopril Toprol     HLD   Optimal     CHRISTOPHER   CPAP    Plan:   CTA cardiac with mild CAD / he is on asa / statin / Toprol    Treat medically for mild CAD   Do D dimer stat  TTE today   Suspect discharge soon if above is wnl   Fu with cardiology in 2-3 weeks     Thank you for allowing to us to participate in the care of Amanda NavarroTamela Amaral APRN-CNP-CVNP    [facility] Interventional cardiology note  Patient awake and alert today. Some vague discomfort in his anterior chest.  The CT angiogram showed minimal coronary artery plaque with a calcium score of 128. We do not see evidence for coronary lesions nothing that required intervention at this time. We will continue to push to treat his LDL to a value of less than or 60. The last LDL we see is from August 2021 was 83. We do have one pending from today. From a cardiac perspective I think he can be teed up to go home. We will plan to follow him back in the office after we get his lipid results.   Tab Becerril MD, [facility]

## 2022-10-11 NOTE — DISCHARGE SUMMARY
Patient ID: Lc Cos      Patient's PCP: JULIA Salinas CNP    Admit Date: 10/10/2022   Discharge Date: 10/11/2022  Admitting Physician: No admitting provider for patient encounter. Discharge Physician: Adela Mireles MD    Discharge Diagnoses:   Chest pain in a patient with known CAD:  -cardiac CT angiogram done with results as below  Troponin negative as well as DDimer  Telemetry monitoring with no arrythmia  Cont asa, statin, metoprolol, lisinopril  Nitro PRN  Outpt Echo per Cardiology     HTN:benign  Ct lisinopril and metoprolol     H/o CVA, CAD:  Statin, aspirin     CHRISTOPHER:  CPAP qhs    Hospital Course:  47 y.o. male with previous CVA with residual left sided hemiparesis-ambulates with a cane,HTN, HLD, also non obstructive CAD based on LHC in 2021 who presented to [facility] with severe chest pain. -saw his primary cardiologist and underwent nuclear stress test which showed some fixed defect in anterior wall, deemed to be artifact. LHC in 2021 showed 40% stenosis of mid LAD. No risk factor for VTE. He was admitted and treated as above prior to discharge home. Physical Exam:  BP (!) 134/92   Pulse 71   Temp 98.6 °F (37 °C) (Oral)   Resp 18   Ht 5' 8\" (1.727 m)   Wt 212 lb 4.8 oz (96.3 kg)   SpO2 96%   BMI 32.28 kg/m²   General appearance: alert, appears stated age and cooperative  Head: Normocephalic, without obvious abnormality, atraumatic  Eyes: conjunctivae/corneas clear. PERRL, EOM's intact.   Neck: supple  Lungs: clear to auscultation bilaterally  Heart: regular rate and rhythm, S1, S2 normal, no murmur,   Abdomen: soft, non-tender; bowel sounds normal  Extremities: no edema  Neurologic: Grossly normal    Consults: none  Significant Diagnostic Studies:  chest x-ray  Treatments: IV hydration  Disposition: home  Discharged Condition: Stable  Follow Up: Primary Care Physician in one week  Discharge Medications:     Medication List        CONTINUE taking these medications amLODIPine 5 MG tablet  Commonly known as: NORVASC  Take 2 tablets by mouth daily     aspirin 81 MG chewable tablet  Take 1 tablet by mouth in the morning and at bedtime     atorvastatin 80 MG tablet  Commonly known as: LIPITOR  Take 1 tablet by mouth nightly     Cholecalciferol 50 MCG (2000 UT) Caps     DULoxetine 60 MG extended release capsule  Commonly known as: CYMBALTA     gabapentin 400 MG capsule  Commonly known as: NEURONTIN     lisinopril 40 MG tablet  Commonly known as: PRINIVIL;ZESTRIL  Take 1 tablet by mouth daily     nitroGLYCERIN 0.4 MG SL tablet  Commonly known as: NITROSTAT  up to max of 3 total doses. If no relief after 1 dose, call 911.      omeprazole 20 MG delayed release capsule  Commonly known as: PRILOSEC     Vitamin B 12 100 MCG [facility]            ASK your doctor about these medications      metoprolol succinate 100 MG extended release tablet  Commonly known as: TOPROL XL  Take 1 tablet by mouth daily             Activity: activity as tolerated  Diet: regular diet  Wound Care: none needed  Time Spent on discharge is more than 30 minutes discussing plan of care and discharge medications with patient and nursing staff    Signed:  MD EFE  Hospitalist Service     10/11/2022

## 2023-03-25 ENCOUNTER — HOSPITAL ENCOUNTER (INPATIENT)
Age: 53
LOS: 1 days | Discharge: HOME | DRG: 312 | End: 2023-03-28
Attending: EMERGENCY MEDICINE | Admitting: INTERNAL MEDICINE
Payer: MEDICARE

## 2023-03-25 ENCOUNTER — APPOINTMENT (OUTPATIENT)
Dept: GENERAL RADIOLOGY | Age: 53
DRG: 312 | End: 2023-03-25
Payer: MEDICARE

## 2023-03-25 DIAGNOSIS — R07.9 CHEST PAIN, UNSPECIFIED TYPE: ICD-10-CM

## 2023-03-25 DIAGNOSIS — R55 SYNCOPE AND COLLAPSE: Primary | ICD-10-CM

## 2023-03-25 LAB
ALBUMIN SERPL-MCNC: 4.4 G/DL (ref 3.4–5)
ALBUMIN/GLOB SERPL: 1.6 {RATIO} (ref 1.1–2.2)
ALP SERPL-CCNC: 109 U/L (ref 40–129)
ALT SERPL-CCNC: 22 U/L (ref 10–40)
ANION GAP SERPL CALCULATED.3IONS-SCNC: 13 MMOL/L (ref 3–16)
AST SERPL-CCNC: 18 U/L (ref 15–37)
BILIRUB SERPL-MCNC: 0.3 MG/DL (ref 0–1)
BUN SERPL-MCNC: 12 MG/DL (ref 7–20)
CALCIUM SERPL-MCNC: 9.2 MG/DL (ref 8.3–10.6)
CHLORIDE SERPL-SCNC: 99 MMOL/L (ref 99–110)
CO2 SERPL-SCNC: 25 MMOL/L (ref 21–32)
CREAT SERPL-MCNC: 1.1 MG/DL (ref 0.9–1.3)
DEPRECATED RDW RBC AUTO: 14 % (ref 12.4–15.4)
GFR SERPLBLD CREATININE-BSD FMLA CKD-EPI: >60 ML/MIN/{1.73_M2}
GLUCOSE SERPL-MCNC: 145 MG/DL (ref 70–99)
HCT VFR BLD AUTO: 40.5 % (ref 40.5–52.5)
HGB BLD-MCNC: 13.9 G/DL (ref 13.5–17.5)
MAGNESIUM SERPL-MCNC: 1.9 MG/DL (ref 1.8–2.4)
MCH RBC QN AUTO: 30 PG (ref 26–34)
MCHC RBC AUTO-ENTMCNC: 34.2 G/DL (ref 31–36)
MCV RBC AUTO: 87.6 FL (ref 80–100)
NT-PROBNP SERPL-MCNC: 9 PG/ML (ref 0–124)
PHOSPHATE SERPL-MCNC: 3.9 MG/DL (ref 2.5–4.9)
PLATELET # BLD AUTO: 214 K/UL (ref 135–450)
PMV BLD AUTO: 8 FL (ref 5–10.5)
POTASSIUM SERPL-SCNC: 4.2 MMOL/L (ref 3.5–5.1)
PROT SERPL-MCNC: 7.2 G/DL (ref 6.4–8.2)
RBC # BLD AUTO: 4.62 M/UL (ref 4.2–5.9)
SODIUM SERPL-SCNC: 137 MMOL/L (ref 136–145)
TROPONIN T SERPL-MCNC: <0.01 NG/ML
TROPONIN T SERPL-MCNC: <0.01 NG/ML
WBC # BLD AUTO: 9.2 K/UL (ref 4–11)

## 2023-03-25 PROCEDURE — 84100 ASSAY OF PHOSPHORUS: CPT

## 2023-03-25 PROCEDURE — 99285 EMERGENCY DEPT VISIT HI MDM: CPT

## 2023-03-25 PROCEDURE — 83880 ASSAY OF NATRIURETIC PEPTIDE: CPT

## 2023-03-25 PROCEDURE — G0378 HOSPITAL OBSERVATION PER HR: HCPCS

## 2023-03-25 PROCEDURE — 85027 COMPLETE CBC AUTOMATED: CPT

## 2023-03-25 PROCEDURE — 84484 ASSAY OF TROPONIN QUANT: CPT

## 2023-03-25 PROCEDURE — 93005 ELECTROCARDIOGRAM TRACING: CPT | Performed by: STUDENT IN AN ORGANIZED HEALTH CARE EDUCATION/TRAINING PROGRAM

## 2023-03-25 PROCEDURE — 71045 X-RAY EXAM CHEST 1 VIEW: CPT

## 2023-03-25 PROCEDURE — 83735 ASSAY OF MAGNESIUM: CPT

## 2023-03-25 PROCEDURE — 80053 COMPREHEN METABOLIC PANEL: CPT

## 2023-03-25 PROCEDURE — 36415 COLL VENOUS BLD VENIPUNCTURE: CPT

## 2023-03-25 RX ORDER — ASPIRIN 81 MG/1
81 TABLET, CHEWABLE ORAL 2 TIMES DAILY
Status: DISCONTINUED | OUTPATIENT
Start: 2023-03-25 | End: 2023-03-28 | Stop reason: HOSPADM

## 2023-03-25 RX ORDER — SODIUM CHLORIDE 0.9 % (FLUSH) 0.9 %
5-40 SYRINGE (ML) INJECTION PRN
Status: DISCONTINUED | OUTPATIENT
Start: 2023-03-25 | End: 2023-03-28 | Stop reason: HOSPADM

## 2023-03-25 RX ORDER — ENOXAPARIN SODIUM 100 MG/ML
30 INJECTION SUBCUTANEOUS 2 TIMES DAILY
Status: DISCONTINUED | OUTPATIENT
Start: 2023-03-25 | End: 2023-03-28 | Stop reason: HOSPADM

## 2023-03-25 RX ORDER — POTASSIUM CHLORIDE 7.45 MG/ML
10 INJECTION INTRAVENOUS PRN
Status: DISCONTINUED | OUTPATIENT
Start: 2023-03-25 | End: 2023-03-28 | Stop reason: HOSPADM

## 2023-03-25 RX ORDER — DULOXETIN HYDROCHLORIDE 60 MG/1
60 CAPSULE, DELAYED RELEASE ORAL NIGHTLY
Status: DISCONTINUED | OUTPATIENT
Start: 2023-03-25 | End: 2023-03-28 | Stop reason: HOSPADM

## 2023-03-25 RX ORDER — SODIUM CHLORIDE 0.9 % (FLUSH) 0.9 %
5-40 SYRINGE (ML) INJECTION EVERY 12 HOURS SCHEDULED
Status: DISCONTINUED | OUTPATIENT
Start: 2023-03-25 | End: 2023-03-28 | Stop reason: HOSPADM

## 2023-03-25 RX ORDER — ATORVASTATIN CALCIUM 80 MG/1
80 TABLET, FILM COATED ORAL NIGHTLY
Status: DISCONTINUED | OUTPATIENT
Start: 2023-03-25 | End: 2023-03-28 | Stop reason: HOSPADM

## 2023-03-25 RX ORDER — ACETAMINOPHEN 325 MG/1
650 TABLET ORAL EVERY 6 HOURS PRN
Status: DISCONTINUED | OUTPATIENT
Start: 2023-03-25 | End: 2023-03-28 | Stop reason: HOSPADM

## 2023-03-25 RX ORDER — ONDANSETRON 2 MG/ML
4 INJECTION INTRAMUSCULAR; INTRAVENOUS EVERY 6 HOURS PRN
Status: DISCONTINUED | OUTPATIENT
Start: 2023-03-25 | End: 2023-03-28 | Stop reason: HOSPADM

## 2023-03-25 RX ORDER — AMLODIPINE BESYLATE 10 MG/1
10 TABLET ORAL DAILY
Status: DISCONTINUED | OUTPATIENT
Start: 2023-03-26 | End: 2023-03-28 | Stop reason: HOSPADM

## 2023-03-25 RX ORDER — SODIUM CHLORIDE 9 MG/ML
INJECTION, SOLUTION INTRAVENOUS PRN
Status: DISCONTINUED | OUTPATIENT
Start: 2023-03-25 | End: 2023-03-28 | Stop reason: HOSPADM

## 2023-03-25 RX ORDER — LISINOPRIL 40 MG/1
40 TABLET ORAL DAILY
Status: DISCONTINUED | OUTPATIENT
Start: 2023-03-26 | End: 2023-03-28 | Stop reason: HOSPADM

## 2023-03-25 RX ORDER — METOPROLOL SUCCINATE 50 MG/1
50 TABLET, EXTENDED RELEASE ORAL NIGHTLY
Status: DISCONTINUED | OUTPATIENT
Start: 2023-03-25 | End: 2023-03-28 | Stop reason: HOSPADM

## 2023-03-25 RX ORDER — GABAPENTIN 400 MG/1
400 CAPSULE ORAL 3 TIMES DAILY
Status: DISCONTINUED | OUTPATIENT
Start: 2023-03-25 | End: 2023-03-28 | Stop reason: HOSPADM

## 2023-03-25 RX ORDER — PANTOPRAZOLE SODIUM 40 MG/1
40 TABLET, DELAYED RELEASE ORAL
Status: DISCONTINUED | OUTPATIENT
Start: 2023-03-26 | End: 2023-03-28 | Stop reason: HOSPADM

## 2023-03-25 RX ORDER — POTASSIUM CHLORIDE 20 MEQ/1
40 TABLET, EXTENDED RELEASE ORAL PRN
Status: DISCONTINUED | OUTPATIENT
Start: 2023-03-25 | End: 2023-03-28 | Stop reason: HOSPADM

## 2023-03-25 RX ORDER — MAGNESIUM HYDROXIDE/ALUMINUM HYDROXICE/SIMETHICONE 120; 1200; 1200 MG/30ML; MG/30ML; MG/30ML
30 SUSPENSION ORAL EVERY 6 HOURS PRN
Status: DISCONTINUED | OUTPATIENT
Start: 2023-03-25 | End: 2023-03-28 | Stop reason: HOSPADM

## 2023-03-25 RX ORDER — ACETAMINOPHEN 650 MG/1
650 SUPPOSITORY RECTAL EVERY 6 HOURS PRN
Status: DISCONTINUED | OUTPATIENT
Start: 2023-03-25 | End: 2023-03-28 | Stop reason: HOSPADM

## 2023-03-25 RX ORDER — ONDANSETRON 4 MG/1
4 TABLET, ORALLY DISINTEGRATING ORAL EVERY 8 HOURS PRN
Status: DISCONTINUED | OUTPATIENT
Start: 2023-03-25 | End: 2023-03-28 | Stop reason: HOSPADM

## 2023-03-25 RX ORDER — NITROGLYCERIN 0.4 MG/1
0.4 TABLET SUBLINGUAL EVERY 5 MIN PRN
Status: DISCONTINUED | OUTPATIENT
Start: 2023-03-25 | End: 2023-03-28 | Stop reason: HOSPADM

## 2023-03-25 RX ORDER — VITAMIN B COMPLEX
50 TABLET ORAL DAILY
Status: DISCONTINUED | OUTPATIENT
Start: 2023-03-26 | End: 2023-03-28 | Stop reason: HOSPADM

## 2023-03-25 RX ORDER — POLYETHYLENE GLYCOL 3350 17 G/17G
17 POWDER, FOR SOLUTION ORAL DAILY PRN
Status: DISCONTINUED | OUTPATIENT
Start: 2023-03-25 | End: 2023-03-28 | Stop reason: HOSPADM

## 2023-03-25 RX ORDER — MAGNESIUM SULFATE IN WATER 40 MG/ML
2000 INJECTION, SOLUTION INTRAVENOUS PRN
Status: DISCONTINUED | OUTPATIENT
Start: 2023-03-25 | End: 2023-03-28 | Stop reason: HOSPADM

## 2023-03-25 ASSESSMENT — LIFESTYLE VARIABLES
HOW OFTEN DO YOU HAVE A DRINK CONTAINING ALCOHOL: NEVER
HOW MANY STANDARD DRINKS CONTAINING ALCOHOL DO YOU HAVE ON A TYPICAL DAY: PATIENT DOES NOT DRINK

## 2023-03-26 LAB
EKG ATRIAL RATE: 88 BPM
EKG DIAGNOSIS: NORMAL
EKG P AXIS: 38 DEGREES
EKG P-R INTERVAL: 138 MS
EKG Q-T INTERVAL: 342 MS
EKG QRS DURATION: 80 MS
EKG QTC CALCULATION (BAZETT): 413 MS
EKG R AXIS: 14 DEGREES
EKG T AXIS: 45 DEGREES
EKG VENTRICULAR RATE: 88 BPM
TROPONIN T SERPL-MCNC: <0.01 NG/ML

## 2023-03-26 PROCEDURE — 2580000003 HC RX 258: Performed by: INTERNAL MEDICINE

## 2023-03-26 PROCEDURE — G0378 HOSPITAL OBSERVATION PER HR: HCPCS

## 2023-03-26 PROCEDURE — 6360000002 HC RX W HCPCS: Performed by: INTERNAL MEDICINE

## 2023-03-26 PROCEDURE — 96372 THER/PROPH/DIAG INJ SC/IM: CPT

## 2023-03-26 PROCEDURE — 94761 N-INVAS EAR/PLS OXIMETRY MLT: CPT

## 2023-03-26 PROCEDURE — 2700000000 HC OXYGEN THERAPY PER DAY

## 2023-03-26 PROCEDURE — 84484 ASSAY OF TROPONIN QUANT: CPT

## 2023-03-26 PROCEDURE — 36415 COLL VENOUS BLD VENIPUNCTURE: CPT

## 2023-03-26 PROCEDURE — 6370000000 HC RX 637 (ALT 250 FOR IP): Performed by: INTERNAL MEDICINE

## 2023-03-26 PROCEDURE — 99222 1ST HOSP IP/OBS MODERATE 55: CPT | Performed by: INTERNAL MEDICINE

## 2023-03-26 RX ADMIN — METOPROLOL SUCCINATE 50 MG: 50 TABLET, EXTENDED RELEASE ORAL at 01:07

## 2023-03-26 RX ADMIN — ENOXAPARIN SODIUM 30 MG: 100 INJECTION SUBCUTANEOUS at 21:09

## 2023-03-26 RX ADMIN — SODIUM CHLORIDE, PRESERVATIVE FREE 10 ML: 5 INJECTION INTRAVENOUS at 01:08

## 2023-03-26 RX ADMIN — SODIUM CHLORIDE, PRESERVATIVE FREE 10 ML: 5 INJECTION INTRAVENOUS at 10:09

## 2023-03-26 RX ADMIN — ATORVASTATIN CALCIUM 80 MG: 80 TABLET, FILM COATED ORAL at 01:06

## 2023-03-26 RX ADMIN — GABAPENTIN 400 MG: 400 CAPSULE ORAL at 14:24

## 2023-03-26 RX ADMIN — LISINOPRIL 40 MG: 40 TABLET ORAL at 10:08

## 2023-03-26 RX ADMIN — MELATONIN 2000 UNITS: at 10:09

## 2023-03-26 RX ADMIN — GABAPENTIN 400 MG: 400 CAPSULE ORAL at 10:11

## 2023-03-26 RX ADMIN — METOPROLOL SUCCINATE 50 MG: 50 TABLET, EXTENDED RELEASE ORAL at 21:10

## 2023-03-26 RX ADMIN — ASPIRIN 81 MG 81 MG: 81 TABLET ORAL at 21:10

## 2023-03-26 RX ADMIN — GABAPENTIN 400 MG: 400 CAPSULE ORAL at 21:09

## 2023-03-26 RX ADMIN — DULOXETINE HYDROCHLORIDE 60 MG: 60 CAPSULE, DELAYED RELEASE ORAL at 21:10

## 2023-03-26 RX ADMIN — ENOXAPARIN SODIUM 30 MG: 100 INJECTION SUBCUTANEOUS at 01:06

## 2023-03-26 RX ADMIN — ENOXAPARIN SODIUM 30 MG: 100 INJECTION SUBCUTANEOUS at 10:08

## 2023-03-26 RX ADMIN — DULOXETINE HYDROCHLORIDE 60 MG: 60 CAPSULE, DELAYED RELEASE ORAL at 01:06

## 2023-03-26 RX ADMIN — ASPIRIN 81 MG 81 MG: 81 TABLET ORAL at 10:07

## 2023-03-26 RX ADMIN — GABAPENTIN 400 MG: 400 CAPSULE ORAL at 01:07

## 2023-03-26 RX ADMIN — AMLODIPINE BESYLATE 10 MG: 10 TABLET ORAL at 10:07

## 2023-03-26 RX ADMIN — PANTOPRAZOLE SODIUM 40 MG: 40 TABLET, DELAYED RELEASE ORAL at 05:55

## 2023-03-26 RX ADMIN — NITROGLYCERIN 0.4 MG: 0.4 TABLET, ORALLY DISINTEGRATING SUBLINGUAL at 10:06

## 2023-03-26 RX ADMIN — SODIUM CHLORIDE, PRESERVATIVE FREE 10 ML: 5 INJECTION INTRAVENOUS at 21:10

## 2023-03-26 RX ADMIN — ATORVASTATIN CALCIUM 80 MG: 80 TABLET, FILM COATED ORAL at 21:10

## 2023-03-26 NOTE — CONSULTS
History and Physical  [facility]   Cardiology    Chief Complaint: chest pain then apparent LOC    HPI:     Patient is a 47 y.o. male presents with his third episode of chest pain, anterior heavy pain followed by LOC. The first two episodes his wife gave him NTG and they did not seek medical care. This time he went in and out, so she called EMS. The Ems records could not be found. The patient has a long hx of stroke 2015 with debate over brain images. Dr. Ballard Brothers:  \"IMPRESSION and PLAN  Recurrent left side weakness and numbness in the setting of previous stroke causing left side weakness and numbness, likely due to worsening of the previous stroke symptoms due to pain, sleep disorder, and possible cervical myelopathy (with history of vit b12 deficiency) and cervical spondylosis. \"     The syncope is peculiar in that he suddenly lost consciousness and sis not remember for 10-15 minutes. He continued to have pain despite ntg. His ekg during pain was normal.        Past Medical History:   Diagnosis Date    CAD (coronary artery disease)     Hemiplegic migraine without status migrainosus, not intractable 12/29/2021    Hyperlipidemia     Hypertension     Stroke ([facility] [address]     2012      Past Surgical History:   Procedure Laterality Date    ELBOW SURGERY Left     HERNIA REPAIR      KNEE ARTHROSCOPY Left 4/6/2022    LEFT KNEE DIAGNOSTIC ARTHROSCOPY,  SYNOVECTOMY,  PARTIAL MEDIAL MENISCECTOMY performed by Kay Hall MD at [place] [address]          Medications Prior to Admission: aspirin 81 MG chewable tablet, Take 1 tablet by mouth in the morning and at bedtime  metoprolol succinate (TOPROL XL) 100 MG extended release tablet, Take 1 tablet by mouth daily (Patient taking differently: Take 50 mg by mouth at bedtime)  nitroGLYCERIN (NITROSTAT) 0.4 MG SL tablet, up to max of 3 total doses. If no relief after 1 dose, call 911.   lisinopril (PRINIVIL;ZESTRIL) 40 MG tablet, Take 1 tablet by mouth

## 2023-03-26 NOTE — ED PROVIDER NOTES
ED Attending Attestation Note     Date of evaluation: 3/25/2023    This patient was seen by the resident. I have seen and examined the patient, agree with the workup, evaluation, management and diagnosis. The care plan has been discussed. I have reviewed the ECG and concur with the resident's interpretation. My assessment reveals patient here with chest pain and syncope concerning for ACS. Normal exam at this time, ECG nonischemic. Initial troponin negative but given syncope + CP, will admit for further evaluation.      Dwan Joyner MD  03/26/23 0005
Hx were reviewed. The patient was given the followingmedications:  Orders Placed This Encounter   Medications    sodium chloride flush 0.9 % injection 5-40 mL    sodium chloride flush 0.9 % injection 5-40 mL    0.9 % sodium chloride infusion    enoxaparin Sodium (LOVENOX) injection 30 mg     Order Specific Question:   Indication of Use     Answer:   Prophylaxis-DVT/PE    OR Linked Order Group     ondansetron (ZOFRAN-ODT) disintegrating tablet 4 mg     ondansetron (ZOFRAN) injection 4 mg    polyethylene glycol (GLYCOLAX) packet 17 g    OR Linked Order Group     acetaminophen (TYLENOL) tablet 650 mg     acetaminophen (TYLENOL) suppository 650 mg    perflutren lipid microspheres (DEFINITY) injection 1.5 mL    magnesium sulfate 2000 mg in 50 mL IVPB premix    OR Linked Order Group     potassium chloride (KLOR-CON M) extended release tablet 40 mEq     potassium bicarb-citric acid (EFFER-K) effervescent tablet 40 mEq     potassium chloride 10 mEq/100 mL IVPB (Peripheral Line)    aluminum & magnesium hydroxide-simethicone (MAALOX) 200-200-20 MG/5ML suspension 30 mL    amLODIPine (NORVASC) tablet 10 mg    aspirin chewable tablet 81 mg    atorvastatin (LIPITOR) tablet 80 mg    Cholecalciferol CAPS 50 mcg    DULoxetine (CYMBALTA) extended release capsule 60 mg    gabapentin (NEURONTIN) capsule 400 mg    lisinopril (PRINIVIL;ZESTRIL) tablet 40 mg    metoprolol succinate (TOPROL XL) extended release tablet 50 mg    nitroGLYCERIN (NITROSTAT) SL tablet 0.4 mg    pantoprazole (PROTONIX) tablet 40 mg         CONSULTS:  IP CONSULT TO HOSPITALIST  IP CONSULT TO [address] [place] [address] / ASSESSMENT / Edyth Nurse is a 47 y.o. male presenting with chief complaint of chest pain and syncope. On initial assessment, the patient's vital signs are stable, remarkable only for some mild hypertension. The patient is well-appearing on examination, and in no acute distress.   His cardiopulmonary examination is

## 2023-03-26 NOTE — H&P
Hospital Medicine History & Physical      PCP: Mac Lee, APRN - CNP    Date of Admission: 3/25/2023    Date of Service: Pt seen/examined on 3/25/2023 and placed in Observation. Chief Complaint: Chest pain    History Of Present Illness:    47 y.o. male non-smoker with a significant past medical history of CAD, hyperlipidemia, CVA who presented to [facility] ED with syncope. Patient presented to the emergency room with chest pain. Patient developed substernal chest pressure radiating to his left arm while watching TV. During the episode he lost consciousness and was difficult to arouse for about 10 minutes. He reports being slightly confused when he came about but no retrograde amnesia, incontinence, tongue or cheek biting. Of significance, patient has had at least 2 other syncopal episodes, albeit of shorter duration while sitting and 1 while walking over the past 2 weeks. Denies any fevers, chills, cough, shortness of breath, orthopnea or PND, increased lower extremity edema. In emergency room he was afebrile, blood pressure 140/96, pulse 94, respirations 18, sats 98% on room air. Past Medical History:          Diagnosis Date    CAD (coronary artery disease)     Hemiplegic migraine without status migrainosus, not intractable 12/29/2021    Hyperlipidemia     Hypertension     Stroke [facility])     2012       Past Surgical History:          Procedure Laterality Date    ELBOW SURGERY Left     HERNIA REPAIR      KNEE ARTHROSCOPY Left 4/6/2022    LEFT KNEE DIAGNOSTIC ARTHROSCOPY,  SYNOVECTOMY,  PARTIAL MEDIAL MENISCECTOMY performed by Ayan Miles MD at [place] [address]         Medications Prior to Admission:      Prior to Admission medications    Medication Sig Start Date End Date Taking?  Authorizing Provider   aspirin 81 MG chewable tablet Take 1 tablet by mouth in the morning and at bedtime 4/6/22   Ayan Miles MD   metoprolol succinate (TOPROL XL) 100 MG extended

## 2023-03-26 NOTE — ED NOTES
oriented  Orientation Level:    NIH Score:    C-SSRS: Risk of Suicide: No Risk  Bedside swallow:    Zhane Coma Scale (GCS): Maurepas Coma Scale  Eye Opening: Spontaneous  Best Verbal Response: Oriented  Best Motor Response: Obeys commands  Zhane Coma Scale Score: 15  Active LDA's:   Peripheral IV 03/25/23 Right Antecubital (Active)   Site Assessment Clean, dry & intact; Clean;Dry; Intact 03/25/23 2046   Line Status Blood return noted;Brisk blood return;Flushed;Normal saline locked;Specimen collected 03/25/23 2046     PO Status: Regular  Pertinent or High Risk Medications/Drips: no   o If Yes, please provide details: n/a  Pending Blood Product Administration: no       You may also review the ED PT Care Timeline found under the Summary Nursing Index tab. Recommendation    Pending orders ed orders complete  Plan for Discharge (if known):    Additional Comments: none  If any further questions, please call Sending RN at [phone]    Electronically signed by: Electronically signed by Sudhakar Devine RN on 3/25/2023 at 10:42 PM     Sudhakar Devine RN  03/25/23 6498

## 2023-03-26 NOTE — PLAN OF CARE
Problem: ABCDS Injury Assessment  Goal: Absence of physical injury  Outcome: Adequate for Discharge     Problem: Discharge Planning  Goal: Discharge to home or other facility with appropriate resources  Outcome: Adequate for Discharge     Problem: Safety - Adult  Goal: Free from fall injury  Outcome: Adequate for Discharge

## 2023-03-27 LAB
LV EF: 63 %
LV EF: 71 %
LVEF MODALITY: NORMAL
LVEF MODALITY: NORMAL

## 2023-03-27 PROCEDURE — A9502 TC99M TETROFOSMIN: HCPCS | Performed by: HOSPITALIST

## 2023-03-27 PROCEDURE — 93306 TTE W/DOPPLER COMPLETE: CPT

## 2023-03-27 PROCEDURE — 6370000000 HC RX 637 (ALT 250 FOR IP): Performed by: INTERNAL MEDICINE

## 2023-03-27 PROCEDURE — 99233 SBSQ HOSP IP/OBS HIGH 50: CPT | Performed by: NURSE PRACTITIONER

## 2023-03-27 PROCEDURE — 1200000000 HC SEMI PRIVATE

## 2023-03-27 PROCEDURE — 6360000002 HC RX W HCPCS: Performed by: INTERNAL MEDICINE

## 2023-03-27 PROCEDURE — 3430000000 HC RX DIAGNOSTIC RADIOPHARMACEUTICAL: Performed by: HOSPITALIST

## 2023-03-27 PROCEDURE — 78452 HT MUSCLE IMAGE SPECT MULT: CPT

## 2023-03-27 PROCEDURE — 93017 CV STRESS TEST TRACING ONLY: CPT

## 2023-03-27 PROCEDURE — 2580000003 HC RX 258: Performed by: INTERNAL MEDICINE

## 2023-03-27 PROCEDURE — 99233 SBSQ HOSP IP/OBS HIGH 50: CPT | Performed by: INTERNAL MEDICINE

## 2023-03-27 PROCEDURE — 96372 THER/PROPH/DIAG INJ SC/IM: CPT

## 2023-03-27 RX ADMIN — ATORVASTATIN CALCIUM 80 MG: 80 TABLET, FILM COATED ORAL at 20:22

## 2023-03-27 RX ADMIN — SODIUM CHLORIDE, PRESERVATIVE FREE 10 ML: 5 INJECTION INTRAVENOUS at 09:18

## 2023-03-27 RX ADMIN — LISINOPRIL 40 MG: 40 TABLET ORAL at 09:18

## 2023-03-27 RX ADMIN — ENOXAPARIN SODIUM 30 MG: 100 INJECTION SUBCUTANEOUS at 09:17

## 2023-03-27 RX ADMIN — MELATONIN 2000 UNITS: at 09:19

## 2023-03-27 RX ADMIN — ASPIRIN 81 MG 81 MG: 81 TABLET ORAL at 09:16

## 2023-03-27 RX ADMIN — TETROFOSMIN 30 MILLICURIE: 1.38 INJECTION, POWDER, LYOPHILIZED, FOR SOLUTION INTRAVENOUS at 15:15

## 2023-03-27 RX ADMIN — GABAPENTIN 400 MG: 400 CAPSULE ORAL at 13:29

## 2023-03-27 RX ADMIN — ASPIRIN 81 MG 81 MG: 81 TABLET ORAL at 20:22

## 2023-03-27 RX ADMIN — ENOXAPARIN SODIUM 30 MG: 100 INJECTION SUBCUTANEOUS at 20:22

## 2023-03-27 RX ADMIN — AMLODIPINE BESYLATE 10 MG: 10 TABLET ORAL at 09:18

## 2023-03-27 RX ADMIN — TETROFOSMIN 10 MILLICURIE: 1.38 INJECTION, POWDER, LYOPHILIZED, FOR SOLUTION INTRAVENOUS at 13:10

## 2023-03-27 RX ADMIN — SODIUM CHLORIDE, PRESERVATIVE FREE 10 ML: 5 INJECTION INTRAVENOUS at 20:25

## 2023-03-27 RX ADMIN — METOPROLOL SUCCINATE 50 MG: 50 TABLET, EXTENDED RELEASE ORAL at 20:22

## 2023-03-27 RX ADMIN — REGADENOSON 0.4 MG: 0.08 INJECTION, SOLUTION INTRAVENOUS at 15:15

## 2023-03-27 RX ADMIN — DULOXETINE HYDROCHLORIDE 60 MG: 60 CAPSULE, DELAYED RELEASE ORAL at 20:22

## 2023-03-27 RX ADMIN — GABAPENTIN 400 MG: 400 CAPSULE ORAL at 20:23

## 2023-03-27 RX ADMIN — GABAPENTIN 400 MG: 400 CAPSULE ORAL at 09:16

## 2023-03-27 NOTE — CARE COORDINATION
CM  following for  d/c planning:    Patient was off  unit  during  rounding ,  per  RN ani d completed  chart  review pt  is  Indep  with Mobility and  ADl's  has  Cpap at  bedside. No  current  services  or other  DME . Admitted  with  Syncope:  cardiology following ;    Pending  Stress test  and  echo  . Likely  d/c  home  yan  CM  need  anticipated  . CM will cont to follow . Electronically signed by Nely Weir RN on 3/27/2023 at 12:51 PM       Nely Weir RN Case Manager  [facility]  [address] [place] [address].   [facility] [address] [phone]  Fax [phone]

## 2023-03-27 NOTE — PLAN OF CARE
Problem: Discharge Planning  Goal: Discharge to home or other facility with appropriate resources  Outcome: Progressing     Problem: Chronic Conditions and Co-morbidities  Goal: Patient's chronic conditions and co-morbidity symptoms are monitored and maintained or improved  Outcome: Progressing     Problem: ABCDS Injury Assessment  Goal: Absence of physical injury  Outcome: Adequate for Discharge     Problem: Safety - Adult  Goal: Free from fall injury  Outcome: Adequate for Discharge

## 2023-03-28 VITALS
RESPIRATION RATE: 18 BRPM | SYSTOLIC BLOOD PRESSURE: 110 MMHG | OXYGEN SATURATION: 91 % | TEMPERATURE: 97.6 F | WEIGHT: 213.3 LBS | DIASTOLIC BLOOD PRESSURE: 70 MMHG | BODY MASS INDEX: 32.33 KG/M2 | HEART RATE: 84 BPM | HEIGHT: 68 IN

## 2023-03-28 PROCEDURE — 6370000000 HC RX 637 (ALT 250 FOR IP): Performed by: INTERNAL MEDICINE

## 2023-03-28 PROCEDURE — 6360000002 HC RX W HCPCS: Performed by: INTERNAL MEDICINE

## 2023-03-28 PROCEDURE — 2580000003 HC RX 258: Performed by: INTERNAL MEDICINE

## 2023-03-28 PROCEDURE — 99233 SBSQ HOSP IP/OBS HIGH 50: CPT | Performed by: NURSE PRACTITIONER

## 2023-03-28 RX ADMIN — PANTOPRAZOLE SODIUM 40 MG: 40 TABLET, DELAYED RELEASE ORAL at 06:14

## 2023-03-28 RX ADMIN — ENOXAPARIN SODIUM 30 MG: 100 INJECTION SUBCUTANEOUS at 08:51

## 2023-03-28 RX ADMIN — SODIUM CHLORIDE, PRESERVATIVE FREE 10 ML: 5 INJECTION INTRAVENOUS at 08:56

## 2023-03-28 RX ADMIN — MELATONIN 2000 UNITS: at 08:56

## 2023-03-28 RX ADMIN — ASPIRIN 81 MG 81 MG: 81 TABLET ORAL at 08:52

## 2023-03-28 RX ADMIN — GABAPENTIN 400 MG: 400 CAPSULE ORAL at 08:56

## 2023-03-28 RX ADMIN — LISINOPRIL 40 MG: 40 TABLET ORAL at 08:53

## 2023-03-28 RX ADMIN — AMLODIPINE BESYLATE 10 MG: 10 TABLET ORAL at 08:53

## 2023-03-28 NOTE — PROGRESS NOTES
4 Eyes Admission Assessment     I agree as the admission nurse that 2 RN's have performed a thorough Head to Toe Skin Assessment on the patient. ALL assessment sites listed below have been assessed on admission. Areas assessed by both nurses:   [x]   Head, Face, and Ears   [x]   Shoulders, Back, and Chest  [x]   Arms, Elbows, and Hands   [x]   Coccyx, Sacrum, and Ischium     Legs, Feet, and Heels        Does the Patient have Skin Breakdown?   No         Vladimir Prevention initiated:  No   Wound Care Orders initiated:  No      [facility] nurse consulted for Pressure Injury (Stage 3,4, Unstageable, DTI, NWPT, and Complex wounds) or Vladimir score 18 or lower:  No      Nurse 1 eSignature: Electronically signed by Jerald Spurling, RN on 3/26/23 at 1:30 AM EDT    **SHARE this note so that the co-signing nurse is able to place an eSignature**    Nurse 2 eSignature: Electronically signed by Saeid Fletcher RN on 3/26/23 at 7:47 AM EDT
[facility]   Cardiology  Note   Dr Ila Ibrahim MD, Jodi Trujillo RN, FNP APRN CVNP  Date: 3/28/2023  Admit Date: 3/25/2023       CC:cp    PMH: CAD, hyperlipidemia, CVA     Following cardiology today for: cp syncope and collapse,   chest pain  nonobstructive CAD  last stress test 2021 was normal-continue aspirin, antiplatelets and beta-blocker    Interval Hx /  Subjective: Today, he is resting in bed VSS   No new complaints today. No major events overnight.   stress test neg and TTE unremarkable     Patient seen and examined. Clinical notes reviewed. Telemetry reviewed / Pertinent labs, diagnostic, device, and imaging results reviewed as a part of this visit  I spent a total of 50 minutes and greater than 50% of the time was spent counseling with patient  coordinating care regarding her diagnosis, treatments and plan of care      Scheduled Meds:  REM  Physical Examination:  Vitals:    03/28/23 0847   BP: 110/70   Pulse: 84   Resp: 18   Temp: 97.6 °F (36.4 °C)   SpO2: 91%      In: 440 [P.O.:440]  Out: 0    Wt Readings from Last 3 Encounters:   03/27/23 213 lb 4.8 oz (96.8 kg)   10/11/22 212 lb 4.8 oz (96.3 kg)   06/07/22 210 lb (95.3 kg)       Intake/Output Summary (Last 24 hours) at 3/28/2023 1448  Last data filed at 3/28/2023 0433  Gross per 24 hour   Intake 440 ml   Output 0 ml   Net 440 ml       Telemetry: Personally Reviewed    Constitutional: Cooperative and in no apparent distress, and appears well nourished  Skin: Warm and pink; no pallor, cyanosis, clubbing, or bruising   HEENT: Symmetric and normocephalic. Cardiovascular: Regular rate and rhythm. S1/S2 present Respiratory: Respirations symmetric and unlabored. Lungs clear to auscultation bilaterally, no wheezing, crackles, or rhonchi  Gastrointestinal: Abdomen soft and round. Bowel sounds normoactive without tenderness or masses.   Musculoskeletal: Bilateral upper and lower extremity strength 5/5 with full ROM  Neurologic/Psych: Awake and
Pt admitted to room 6324, A+O x 4. Admits to mild chest discomfort, 5/10 on pain scale. 2 liters NC donned . Orthostatic VS charted. Oriented to room/bed. Call light in reach.
Pt discharged home with los. Pt's IV and telemetry monitor removed. Pt's questions and concerns addressed, pt left with all personal belongings.
Pt off the floor for stress test.
Pt on 1 liter NC for comfort, admits to a dull chest discomfort, but not pain or pressure, vital signs stable, afebrile. Cpap in place, NPO after midnight for Stress test in AM. Call light in reach.
Pt resting quietly. 2 liters NC in place. Vital signs stable, afebrile. Trop (-), Call light in reach.
normocephalic. Cardiovascular: Regular rate and rhythm. S1/S2 present Respiratory: Respirations symmetric and unlabored. Lungs clear to auscultation bilaterally, no wheezing, crackles, or rhonchi  Gastrointestinal: Abdomen soft and round. Bowel sounds normoactive without tenderness or masses. Musculoskeletal: Bilateral upper and lower extremity strength 5/5 with full ROM  Neurologic/Psych: Awake and orientated to person, place and time. Calm affect, appropriate mood      Assessment     Hx non obstructive CAD     HTN  WNL    HLD    CVA   2012 with residual left hemiparesis  continue statin and antiplatelets    Cardiac meds: Norvasc asa Lipitor asa lisinopril Toprol      Plan   Lexiscan today     Thank you for allowing to us to participate in the care of Lamin Storey. Jeana Velázquez APRN-CNP-CVNP    [address]     Interventional cardiology note patient here with history of CAD hyperlipidemia CVA and now having some chest discomfort seemingly atypical.  He is having his stress nuclear study today and results are pending. We may need to have a cardiac catheterization. We will check the nuclear results when it becomes available. Also echo report pending. We will follow acutely.   Bryn Brery MD, [facility]
sounds. Musculoskeletal: No clubbing, cyanosis or edema bilaterally. Full range of motion without deformity. Skin: Skin color, texture, turgor normal.  No rashes or lesions. Neurologic:  Neurovascularly intact without any focal sensory/motor deficits. Cranial nerves: II-XII intact, grossly non-focal.  Psychiatric: Alert and oriented, thought content appropriate, normal insight  Capillary Refill: Brisk, 3 seconds, normal   Peripheral Pulses: +2 palpable, equal bilaterally       Labs:   Recent Labs     03/25/23 2057   WBC 9.2   HGB 13.9   HCT 40.5          Recent Labs     03/25/23 2056 03/25/23 2057   NA  --  137   K  --  4.2   CL  --  99   CO2  --  25   BUN  --  12   CREATININE  --  1.1   CALCIUM  --  9.2   PHOS 3.9  --        Recent Labs     03/25/23 2057   AST 18   ALT 22   BILITOT 0.3   ALKPHOS 109       No results for input(s): INR in the last 72 hours. Recent Labs     03/25/23 2057 03/25/23 2258 03/26/23  0239   TROPONINI <0.01 <0.01 <0.01         Urinalysis:    No results found for: Purnima Merl, BACTERIA, RBCUA, BLOODU, SPECGRAV, GLUCOSEU    Radiology:  XR CHEST PORTABLE   Final Result   1. No evidence of acute cardiopulmonary disease. NM Cardiac Stress Test Nuclear Imaging    (Results Pending)       IP CONSULT TO HOSPITALIST  IP CONSULT TO CARDIOLOGY    Assessment/Plan:      Syncope and collapse,   chest pain  History of nonobstructive CAD  --last stress test 2021 was normal-continue aspirin, antiplatelets and beta-blocker  2D echo and stress test  Appreciate cardiology recs  Hx CVA 2012 with residual left hemiparesis-continue statin and antiplatelets  Essential hypertension--stable-continue metoprolol, amlodipine and lisinopril  Hyperlipidemia-continue    DVT Prophylaxis: Lovenox  Diet: Diet NPO  Code Status: Full Code  PT/OT Eval Status:     Disposition. ..  Stress test and echo pending      Mary Ellen Gutierrez MD
Status:           Mary Ellen Gutierrez MD

## 2023-03-28 NOTE — PLAN OF CARE
Problem: Discharge Planning  Goal: Discharge to home or other facility with appropriate resources  3/27/2023 2343 by Tom Campa RN  Outcome: Progressing     Problem: Safety - Adult  Goal: Free from fall injury  3/27/2023 2343 by Tom Campa RN  Outcome: Progressing    Patient calm and cooperative with care. Denies chest pain, pt reports pressure, not painful. Patient informed of safety measures, call light use, and plan of care. Patient verbalizes understanding. Call light within reach. Will continue monitor.

## 2023-03-28 NOTE — CARE COORDINATION
Case Management Assessment            Discharge Note                    Date / Time of Note: 3/28/2023 12:02 PM                  Discharge Note Completed by: Valerio Champion RN    Patient Name: Gagandeep Prieto   YOB: 1970  Diagnosis: Syncope and collapse [R55]  Chest pain, unspecified type [R07.9]   Date / Time: 3/25/2023  8:35 PM    Current PCP: Suzette Guillen patient: No    Hospitalization in the last 30 days: No       Advance Directives:  Code Status: Full Code  [place] DNR form completed and on chart: No    Financial:  Payor: Chacorta Ricks / Plan: Fazal Chimera / Product Type: *No Product type* /      Pharmacy:    [facility] [id number] [address] [phone]  [address] [place] [address]  Phone: [phone] Fax: [phone]    [address] [facility] [place] [address]  [facility] [address]  Phone: [phone] Fax: [phone]      Assistance purchasing medications?:    Assistance provided by Case Management: None at this time    Does patient want to participate in local refill/ meds to beds program?: Yes    Meds To Beds General Rules:  1. Can ONLY be done Monday- Friday between 8:30am-5pm  2. Prescription(s) must be in pharmacy by 3pm to be filled same day  3. Copy of patient's insurance/ prescription drug card and patient face sheet must be sent along with the prescription(s)  4. Cost of Rx cannot be added to hospital bill. If financial assistance is needed, please contact unit  or ;  or  CANNOT provide pharmacy voucher for patients co-pays  5.  Patients can then  the prescription on their way out of the hospital at discharge, or pharmacy can deliver to the bedside if staff is available. (payment due at time of pick-up or delivery - cash, check, or card

## 2023-03-28 NOTE — DISCHARGE SUMMARY
Hospital Discharge Summary    Patient's PCP: JULIA Rader - YONY  Admit Date: 3/25/2023   Discharge Date: 3/28/2023    Admitting Physician: Dr. Betina Quesada MD  Discharge Physician: Dr. Betina Quesada MD   Consults: cardiology    Brief HPI:     47 y.o. male non-smoker with a significant past medical history of CAD, hyperlipidemia, CVA who presented to [facility] ED with syncope. Patient presented to the emergency room with chest pain. Patient developed substernal chest pressure radiating to his left arm while watching TV. During the episode he lost consciousness and was difficult to arouse for about 10 minutes. He reports being slightly confused when he came about but no retrograde amnesia, incontinence, tongue or cheek biting. Of significance, patient has had at least 2 other syncopal episodes, albeit of shorter duration while sitting and 1 while walking over the past 2 weeks. Denies any fevers, chills, cough, shortness of breath, orthopnea or PND, increased lower extremity edema. In emergency room he was afebrile, blood pressure 140/96, pulse 94, respirations 18, sats 98% on room air. Brief hospital course:        Syncope and collapse,   Atypical chest pain-resolved  History of nonobstructive CAD  --last stress test 2021 was normal-continue aspirin, antiplatelets and beta-blocker  2D echo and stress test wnl  F/u with cards    Hx CVA 2012 with residual left hemiparesis-continue statin and antiplatelets  Essential hypertension--stable-continue metoprolol, amlodipine and lisinopril  Hyperlipidemia-continue    Invasive procedures:  none    Discharge Diagnoses:   Principal Problem:    Syncope and collapse  Active Problems:    Chest pain    Hypertension    Hyperlipidemia  Resolved Problems:    * No resolved hospital problems.  *      Physical Exam: /70   Pulse 84   Temp 97.6 °F (36.4 °C) (Oral)   Resp 18   Ht 5' 8\" (1.727 m)   Wt 213 lb 4.8 oz (96.8 kg)   SpO2 91%   BMI

## 2023-03-28 NOTE — PLAN OF CARE
Problem: ABCDS Injury Assessment  Goal: Absence of physical injury  Outcome: Adequate for Discharge     Problem: Discharge Planning  Goal: Discharge to home or other facility with appropriate resources  3/28/2023 1144 by Armando Cross RN  Outcome: Adequate for Discharge     Problem: Safety - Adult  Goal: Free from fall injury  3/28/2023 1144 by Armando Cross RN  Outcome: Adequate for Discharge     Problem: Chronic Conditions and Co-morbidities  Goal: Patient's chronic conditions and co-morbidity symptoms are monitored and maintained or improved  Outcome: Adequate for Discharge

## 2023-08-28 ENCOUNTER — PREP FOR PROCEDURE (OUTPATIENT)
Dept: ORTHOPEDIC SURGERY | Age: 53
End: 2023-08-28

## 2023-08-28 DIAGNOSIS — M17.12 PRIMARY OSTEOARTHRITIS OF LEFT KNEE: Primary | ICD-10-CM

## 2023-08-28 RX ORDER — SODIUM CHLORIDE 0.9 % (FLUSH) 0.9 %
5-40 SYRINGE (ML) INJECTION PRN
Status: CANCELLED | OUTPATIENT
Start: 2023-09-18

## 2023-08-28 RX ORDER — SODIUM CHLORIDE 9 MG/ML
INJECTION, SOLUTION INTRAVENOUS CONTINUOUS
Status: CANCELLED | OUTPATIENT
Start: 2023-09-18

## 2023-08-28 RX ORDER — PREGABALIN 75 MG/1
75 CAPSULE ORAL ONCE
Status: CANCELLED | OUTPATIENT
Start: 2023-09-18 | End: 2023-08-28

## 2023-08-28 RX ORDER — SODIUM CHLORIDE 0.9 % (FLUSH) 0.9 %
5-40 SYRINGE (ML) INJECTION EVERY 12 HOURS SCHEDULED
Status: CANCELLED | OUTPATIENT
Start: 2023-09-18

## 2023-08-28 RX ORDER — ACETAMINOPHEN 500 MG
1000 TABLET ORAL ONCE
Status: CANCELLED | OUTPATIENT
Start: 2023-09-18 | End: 2023-08-28

## 2023-08-28 RX ORDER — SODIUM CHLORIDE 9 MG/ML
INJECTION, SOLUTION INTRAVENOUS PRN
Status: CANCELLED | OUTPATIENT
Start: 2023-09-18

## 2023-08-28 RX ORDER — CELECOXIB 200 MG/1
200 CAPSULE ORAL ONCE
Status: CANCELLED | OUTPATIENT
Start: 2023-09-18 | End: 2023-08-28

## 2023-08-28 RX ORDER — DEXAMETHASONE SODIUM PHOSPHATE 4 MG/ML
8 INJECTION, SOLUTION INTRA-ARTICULAR; INTRALESIONAL; INTRAMUSCULAR; INTRAVENOUS; SOFT TISSUE ONCE
Status: CANCELLED | OUTPATIENT
Start: 2023-09-18 | End: 2023-08-28

## 2023-09-18 ENCOUNTER — HOSPITAL ENCOUNTER (OUTPATIENT)
Dept: CT IMAGING | Age: 53
Discharge: HOME | End: 2023-09-18
Attending: ORTHOPAEDIC SURGERY
Payer: MEDICARE

## 2023-09-18 DIAGNOSIS — M17.12 PRIMARY OSTEOARTHRITIS OF LEFT KNEE: ICD-10-CM

## 2023-09-18 LAB — APTT BLD: 33.6 SEC (ref 22.7–35.9)

## 2023-09-18 PROCEDURE — 73700 CT LOWER EXTREMITY W/O DYE: CPT

## 2023-09-19 LAB
25(OH)D3 SERPL-MCNC: 48.3 NG/ML
ALBUMIN SERPL-MCNC: 4.7 G/DL (ref 3.4–5)
ALBUMIN/GLOB SERPL: 2 {RATIO} (ref 1.1–2.2)
ALP SERPL-CCNC: 96 U/L (ref 40–129)
ALT SERPL-CCNC: 19 U/L (ref 10–40)
ANION GAP SERPL CALCULATED.3IONS-SCNC: 10 MMOL/L (ref 3–16)
AST SERPL-CCNC: 14 U/L (ref 15–37)
BILIRUB SERPL-MCNC: 0.6 MG/DL (ref 0–1)
BILIRUB UR QL STRIP.AUTO: NEGATIVE
BUN SERPL-MCNC: 13 MG/DL (ref 7–20)
CALCIUM SERPL-MCNC: 9.2 MG/DL (ref 8.3–10.6)
CHLORIDE SERPL-SCNC: 102 MMOL/L (ref 99–110)
CLARITY UR: CLEAR
CO2 SERPL-SCNC: 29 MMOL/L (ref 21–32)
COLOR UR: YELLOW
CREAT SERPL-MCNC: 1 MG/DL (ref 0.9–1.3)
DEPRECATED RDW RBC AUTO: 14.3 % (ref 12.4–15.4)
EST. AVERAGE GLUCOSE BLD GHB EST-MCNC: 131.2 MG/DL
GFR SERPLBLD CREATININE-BSD FMLA CKD-EPI: >60 ML/MIN/{1.73_M2}
GLUCOSE SERPL-MCNC: 80 MG/DL (ref 70–99)
GLUCOSE UR STRIP.AUTO-MCNC: NEGATIVE MG/DL
HBA1C MFR BLD: 6.2 %
HCT VFR BLD AUTO: 39.2 % (ref 40.5–52.5)
HGB BLD-MCNC: 13.4 G/DL (ref 13.5–17.5)
HGB UR QL STRIP.AUTO: NEGATIVE
KETONES UR STRIP.AUTO-MCNC: NEGATIVE MG/DL
LEUKOCYTE ESTERASE UR QL STRIP.AUTO: NEGATIVE
MCH RBC QN AUTO: 30.4 PG (ref 26–34)
MCHC RBC AUTO-ENTMCNC: 34.2 G/DL (ref 31–36)
MCV RBC AUTO: 89 FL (ref 80–100)
NITRITE UR QL STRIP.AUTO: NEGATIVE
PH UR STRIP.AUTO: 6.5 [PH] (ref 5–8)
PLATELET # BLD AUTO: 212 K/UL (ref 135–450)
PMV BLD AUTO: 8.2 FL (ref 5–10.5)
POTASSIUM SERPL-SCNC: 4.1 MMOL/L (ref 3.5–5.1)
PREALB SERPL-MCNC: 29.6 MG/DL (ref 20–40)
PROT SERPL-MCNC: 7.1 G/DL (ref 6.4–8.2)
PROT UR STRIP.AUTO-MCNC: NEGATIVE MG/DL
RBC # BLD AUTO: 4.4 M/UL (ref 4.2–5.9)
SODIUM SERPL-SCNC: 141 MMOL/L (ref 136–145)
SP GR UR STRIP.AUTO: 1.01 (ref 1–1.03)
UA DIPSTICK W REFLEX MICRO PNL UR: NORMAL
URN SPEC COLLECT METH UR: NORMAL
UROBILINOGEN UR STRIP-ACNC: 0.2 E.U./DL
WBC # BLD AUTO: 5.8 K/UL (ref 4–11)

## 2023-09-21 ENCOUNTER — TELEPHONE (OUTPATIENT)
Dept: ORTHOPEDIC SURGERY | Age: 53
End: 2023-09-21

## 2023-09-21 NOTE — TELEPHONE ENCOUNTER
Orthopedic Nurse Navigator Summary    Patient Name:  Victor Manuel Wyman  Anticipated Date of Surgery:  10/04/23  Attended Pre-op Education Class:  Video sent to patient email 09/15/23  PCP: Contreras Smith MD  Date of PCP visit for H&P:  09/27/23  Is patient in a Pain Management program:  Review of Medical history reveals history of: HTN, Hyperlipidemia, CVA- with Left hemiparesis, TIA, CAD, CHRISTOPHER on CPAP, GERD    Critical Lab Values  - Hemoglobin (g/dL):  Date: 09/18/23 Value 13.4  - Hematocrit(%): Date: 09/18/23  Value 39.2  - HgbA1C:  Date: 09/18/23 Value 6.2  - Albumin:  Date: 09/18/23  Value 4.7  - BUN:  Date: 09/18/23   Value 13  - Creatinine:  Date: 09/18/23  Value 1.0    Coronary Artery Disease/HTN/CHF history: Yes  Cardiologist: Francis Vail MD  Cardiac clearance necessary:  Yes  Date of cardiac clearance appt: 06/16/23  Final Cardiac recommendations: On any anticoagulation: Aspirin 81 mg QD    Diabetes History:  No  Most recent HgbA1C: 6.2  Pulmonary:  COPD/Emphysema/Use of home oxygen: No  Alcohol use: No    BMI greater than 40 at time of scheduling: Additional medical concerns:   Additional recommendations for above concerns:  Attended Pre-Hab program:    Anticipated Discharge Disposition:  Home with OPT  Who will be with patient at home following discharge:  wife  Equipment patient already has:  walker, cane  Bedroom on first or second floor:  first  Bathroom on first or second floor:  first  Weight bearing status:  wbat  Pre-op ambulatory status: painful ambulation  Number of entry steps:  4  Caregiver assistance:  full time    Maria Elena Harper RN  Date: 09/21/23

## 2023-10-04 ENCOUNTER — ANESTHESIA (OUTPATIENT)
Dept: OPERATING ROOM | Age: 53
End: 2023-10-04
Payer: MEDICARE

## 2023-10-04 ENCOUNTER — ANESTHESIA EVENT (OUTPATIENT)
Dept: OPERATING ROOM | Age: 53
End: 2023-10-04
Payer: MEDICARE

## 2023-10-04 ENCOUNTER — HOSPITAL ENCOUNTER (OUTPATIENT)
Age: 53
Setting detail: OUTPATIENT SURGERY
Discharge: HOME | End: 2023-10-04
Attending: ORTHOPAEDIC SURGERY | Admitting: ORTHOPAEDIC SURGERY
Payer: MEDICARE

## 2023-10-04 ENCOUNTER — APPOINTMENT (OUTPATIENT)
Dept: GENERAL RADIOLOGY | Age: 53
End: 2023-10-04
Attending: ORTHOPAEDIC SURGERY
Payer: MEDICARE

## 2023-10-04 VITALS
SYSTOLIC BLOOD PRESSURE: 109 MMHG | HEIGHT: 68 IN | DIASTOLIC BLOOD PRESSURE: 73 MMHG | TEMPERATURE: 97.6 F | HEART RATE: 86 BPM | WEIGHT: 210.4 LBS | RESPIRATION RATE: 18 BRPM | OXYGEN SATURATION: 92 % | BODY MASS INDEX: 31.89 KG/M2

## 2023-10-04 DIAGNOSIS — I25.10 CORONARY ARTERY DISEASE DUE TO LIPID RICH PLAQUE: ICD-10-CM

## 2023-10-04 DIAGNOSIS — I25.83 CORONARY ARTERY DISEASE DUE TO LIPID RICH PLAQUE: ICD-10-CM

## 2023-10-04 DIAGNOSIS — M17.12 LOCALIZED OSTEOARTHRITIS OF LEFT KNEE: Primary | ICD-10-CM

## 2023-10-04 LAB
ABO + RH BLD: NORMAL
BLD GP AB SCN SERPL QL: NORMAL
GLUCOSE BLD-MCNC: 107 MG/DL (ref 70–99)
GLUCOSE BLD-MCNC: 128 MG/DL (ref 70–99)
PERFORMED ON: ABNORMAL
PERFORMED ON: ABNORMAL

## 2023-10-04 PROCEDURE — 2500000003 HC RX 250 WO HCPCS: Performed by: STUDENT IN AN ORGANIZED HEALTH CARE EDUCATION/TRAINING PROGRAM

## 2023-10-04 PROCEDURE — 7100000011 HC PHASE II RECOVERY - ADDTL 15 MIN: Performed by: ORTHOPAEDIC SURGERY

## 2023-10-04 PROCEDURE — 6360000002 HC RX W HCPCS: Performed by: ORTHOPAEDIC SURGERY

## 2023-10-04 PROCEDURE — A4217 STERILE WATER/SALINE, 500 ML: HCPCS | Performed by: ORTHOPAEDIC SURGERY

## 2023-10-04 PROCEDURE — 97530 THERAPEUTIC ACTIVITIES: CPT

## 2023-10-04 PROCEDURE — 6370000000 HC RX 637 (ALT 250 FOR IP): Performed by: ORTHOPAEDIC SURGERY

## 2023-10-04 PROCEDURE — C1776 JOINT DEVICE (IMPLANTABLE): HCPCS | Performed by: ORTHOPAEDIC SURGERY

## 2023-10-04 PROCEDURE — 2720000010 HC SURG SUPPLY STERILE: Performed by: ORTHOPAEDIC SURGERY

## 2023-10-04 PROCEDURE — 2580000003 HC RX 258: Performed by: ORTHOPAEDIC SURGERY

## 2023-10-04 PROCEDURE — 3700000001 HC ADD 15 MINUTES (ANESTHESIA): Performed by: ORTHOPAEDIC SURGERY

## 2023-10-04 PROCEDURE — 7100000001 HC PACU RECOVERY - ADDTL 15 MIN: Performed by: ORTHOPAEDIC SURGERY

## 2023-10-04 PROCEDURE — 86900 BLOOD TYPING SEROLOGIC ABO: CPT

## 2023-10-04 PROCEDURE — 7100000010 HC PHASE II RECOVERY - FIRST 15 MIN: Performed by: ORTHOPAEDIC SURGERY

## 2023-10-04 PROCEDURE — 97161 PT EVAL LOW COMPLEX 20 MIN: CPT

## 2023-10-04 PROCEDURE — 2580000003 HC RX 258: Performed by: ANESTHESIOLOGY

## 2023-10-04 PROCEDURE — 6360000002 HC RX W HCPCS: Performed by: STUDENT IN AN ORGANIZED HEALTH CARE EDUCATION/TRAINING PROGRAM

## 2023-10-04 PROCEDURE — C1713 ANCHOR/SCREW BN/BN,TIS/BN: HCPCS | Performed by: ORTHOPAEDIC SURGERY

## 2023-10-04 PROCEDURE — 86901 BLOOD TYPING SEROLOGIC RH(D): CPT

## 2023-10-04 PROCEDURE — 6360000002 HC RX W HCPCS: Performed by: FAMILY MEDICINE

## 2023-10-04 PROCEDURE — 7100000000 HC PACU RECOVERY - FIRST 15 MIN: Performed by: ORTHOPAEDIC SURGERY

## 2023-10-04 PROCEDURE — 64447 NJX AA&/STRD FEMORAL NRV IMG: CPT | Performed by: FAMILY MEDICINE

## 2023-10-04 PROCEDURE — 3700000000 HC ANESTHESIA ATTENDED CARE: Performed by: ORTHOPAEDIC SURGERY

## 2023-10-04 PROCEDURE — 97535 SELF CARE MNGMENT TRAINING: CPT

## 2023-10-04 PROCEDURE — 2580000003 HC RX 258: Performed by: FAMILY MEDICINE

## 2023-10-04 PROCEDURE — 2500000003 HC RX 250 WO HCPCS: Performed by: ORTHOPAEDIC SURGERY

## 2023-10-04 PROCEDURE — 86850 RBC ANTIBODY SCREEN: CPT

## 2023-10-04 PROCEDURE — 6370000000 HC RX 637 (ALT 250 FOR IP): Performed by: STUDENT IN AN ORGANIZED HEALTH CARE EDUCATION/TRAINING PROGRAM

## 2023-10-04 PROCEDURE — 2709999900 HC NON-CHARGEABLE SUPPLY: Performed by: ORTHOPAEDIC SURGERY

## 2023-10-04 PROCEDURE — 97116 GAIT TRAINING THERAPY: CPT

## 2023-10-04 PROCEDURE — 2580000003 HC RX 258: Performed by: STUDENT IN AN ORGANIZED HEALTH CARE EDUCATION/TRAINING PROGRAM

## 2023-10-04 PROCEDURE — 97165 OT EVAL LOW COMPLEX 30 MIN: CPT

## 2023-10-04 PROCEDURE — 3600000014 HC SURGERY LEVEL 4 ADDTL 15MIN: Performed by: ORTHOPAEDIC SURGERY

## 2023-10-04 PROCEDURE — 73560 X-RAY EXAM OF KNEE 1 OR 2: CPT

## 2023-10-04 PROCEDURE — 3600000004 HC SURGERY LEVEL 4 BASE: Performed by: ORTHOPAEDIC SURGERY

## 2023-10-04 DEVICE — CEMENT BNE 20ML 40GM FULL DOSE PMMA W/O ANTIBIO M VISC: Type: IMPLANTABLE DEVICE | Site: KNEE | Status: FUNCTIONAL

## 2023-10-04 DEVICE — IMPLANTABLE DEVICE: Type: IMPLANTABLE DEVICE | Site: KNEE | Status: FUNCTIONAL

## 2023-10-04 DEVICE — COMPONENT PART KNEE CAPPED K1 STRYKER: Type: IMPLANTABLE DEVICE | Site: KNEE | Status: FUNCTIONAL

## 2023-10-04 DEVICE — COMPONENT FEM SZ 5 L MEDIAL/RIGHT LAT UNI KNEE FOR: Type: IMPLANTABLE DEVICE | Site: KNEE | Status: FUNCTIONAL

## 2023-10-04 DEVICE — BASEPLATE TIB SZ 5 L MEDIAL/RIGHT LAT UNI KNEE TI ONLAY FOR: Type: IMPLANTABLE DEVICE | Site: KNEE | Status: FUNCTIONAL

## 2023-10-04 RX ORDER — AMOXICILLIN 250 MG
2 CAPSULE ORAL DAILY PRN
Qty: 30 TABLET | Refills: 2 | Status: SHIPPED | OUTPATIENT
Start: 2023-10-04

## 2023-10-04 RX ORDER — PROCHLORPERAZINE EDISYLATE 5 MG/ML
5 INJECTION INTRAMUSCULAR; INTRAVENOUS
Status: DISCONTINUED | OUTPATIENT
Start: 2023-10-04 | End: 2023-10-04 | Stop reason: HOSPADM

## 2023-10-04 RX ORDER — PHENYLEPHRINE HYDROCHLORIDE 10 MG/ML
INJECTION INTRAVENOUS PRN
Status: DISCONTINUED | OUTPATIENT
Start: 2023-10-04 | End: 2023-10-04 | Stop reason: SDUPTHER

## 2023-10-04 RX ORDER — SODIUM CHLORIDE 0.9 % (FLUSH) 0.9 %
5-40 SYRINGE (ML) INJECTION PRN
Status: DISCONTINUED | OUTPATIENT
Start: 2023-10-04 | End: 2023-10-04 | Stop reason: HOSPADM

## 2023-10-04 RX ORDER — ROCURONIUM BROMIDE 10 MG/ML
INJECTION, SOLUTION INTRAVENOUS PRN
Status: DISCONTINUED | OUTPATIENT
Start: 2023-10-04 | End: 2023-10-04 | Stop reason: SDUPTHER

## 2023-10-04 RX ORDER — MEPERIDINE HYDROCHLORIDE 25 MG/ML
12.5 INJECTION INTRAMUSCULAR; INTRAVENOUS; SUBCUTANEOUS EVERY 5 MIN PRN
Status: DISCONTINUED | OUTPATIENT
Start: 2023-10-04 | End: 2023-10-04 | Stop reason: HOSPADM

## 2023-10-04 RX ORDER — DEXAMETHASONE SODIUM PHOSPHATE 4 MG/ML
8 INJECTION, SOLUTION INTRA-ARTICULAR; INTRALESIONAL; INTRAMUSCULAR; INTRAVENOUS; SOFT TISSUE ONCE
Status: COMPLETED | OUTPATIENT
Start: 2023-10-04 | End: 2023-10-04

## 2023-10-04 RX ORDER — DEXAMETHASONE SODIUM PHOSPHATE 4 MG/ML
4 INJECTION, SOLUTION INTRA-ARTICULAR; INTRALESIONAL; INTRAMUSCULAR; INTRAVENOUS; SOFT TISSUE EVERY 12 HOURS
Status: DISCONTINUED | OUTPATIENT
Start: 2023-10-04 | End: 2023-10-04 | Stop reason: HOSPADM

## 2023-10-04 RX ORDER — MAGNESIUM HYDROXIDE 1200 MG/15ML
LIQUID ORAL CONTINUOUS PRN
Status: DISCONTINUED | OUTPATIENT
Start: 2023-10-04 | End: 2023-10-04 | Stop reason: HOSPADM

## 2023-10-04 RX ORDER — DIPHENHYDRAMINE HYDROCHLORIDE 50 MG/ML
12.5 INJECTION INTRAMUSCULAR; INTRAVENOUS
Status: DISCONTINUED | OUTPATIENT
Start: 2023-10-04 | End: 2023-10-04 | Stop reason: HOSPADM

## 2023-10-04 RX ORDER — CALCIUM CHLORIDE 100 MG/ML
INJECTION INTRAVENOUS; INTRAVENTRICULAR PRN
Status: DISCONTINUED | OUTPATIENT
Start: 2023-10-04 | End: 2023-10-04 | Stop reason: SDUPTHER

## 2023-10-04 RX ORDER — SODIUM CHLORIDE 9 MG/ML
INJECTION, SOLUTION INTRAVENOUS PRN
Status: DISCONTINUED | OUTPATIENT
Start: 2023-10-04 | End: 2023-10-04 | Stop reason: HOSPADM

## 2023-10-04 RX ORDER — HYDROMORPHONE HYDROCHLORIDE 1 MG/ML
0.5 INJECTION, SOLUTION INTRAMUSCULAR; INTRAVENOUS; SUBCUTANEOUS EVERY 5 MIN PRN
Status: DISCONTINUED | OUTPATIENT
Start: 2023-10-04 | End: 2023-10-04 | Stop reason: HOSPADM

## 2023-10-04 RX ORDER — DEXAMETHASONE SODIUM PHOSPHATE 4 MG/ML
INJECTION, SOLUTION INTRA-ARTICULAR; INTRALESIONAL; INTRAMUSCULAR; INTRAVENOUS; SOFT TISSUE PRN
Status: DISCONTINUED | OUTPATIENT
Start: 2023-10-04 | End: 2023-10-04 | Stop reason: SDUPTHER

## 2023-10-04 RX ORDER — SODIUM CHLORIDE 0.9 % (FLUSH) 0.9 %
5-40 SYRINGE (ML) INJECTION EVERY 12 HOURS SCHEDULED
Status: DISCONTINUED | OUTPATIENT
Start: 2023-10-04 | End: 2023-10-04 | Stop reason: HOSPADM

## 2023-10-04 RX ORDER — PREGABALIN 75 MG/1
75 CAPSULE ORAL ONCE
Status: COMPLETED | OUTPATIENT
Start: 2023-10-04 | End: 2023-10-04

## 2023-10-04 RX ORDER — SODIUM CHLORIDE, SODIUM LACTATE, POTASSIUM CHLORIDE, CALCIUM CHLORIDE 600; 310; 30; 20 MG/100ML; MG/100ML; MG/100ML; MG/100ML
INJECTION, SOLUTION INTRAVENOUS CONTINUOUS
Status: DISCONTINUED | OUTPATIENT
Start: 2023-10-04 | End: 2023-10-04 | Stop reason: HOSPADM

## 2023-10-04 RX ORDER — ONDANSETRON 2 MG/ML
INJECTION INTRAMUSCULAR; INTRAVENOUS PRN
Status: DISCONTINUED | OUTPATIENT
Start: 2023-10-04 | End: 2023-10-04 | Stop reason: SDUPTHER

## 2023-10-04 RX ORDER — LABETALOL HYDROCHLORIDE 5 MG/ML
10 INJECTION, SOLUTION INTRAVENOUS
Status: DISCONTINUED | OUTPATIENT
Start: 2023-10-04 | End: 2023-10-04 | Stop reason: HOSPADM

## 2023-10-04 RX ORDER — LIDOCAINE HYDROCHLORIDE 10 MG/ML
1 INJECTION, SOLUTION EPIDURAL; INFILTRATION; INTRACAUDAL; PERINEURAL
Status: DISCONTINUED | OUTPATIENT
Start: 2023-10-04 | End: 2023-10-04 | Stop reason: HOSPADM

## 2023-10-04 RX ORDER — PROPOFOL 10 MG/ML
INJECTION, EMULSION INTRAVENOUS PRN
Status: DISCONTINUED | OUTPATIENT
Start: 2023-10-04 | End: 2023-10-04 | Stop reason: SDUPTHER

## 2023-10-04 RX ORDER — GLYCOPYRROLATE 0.2 MG/ML
INJECTION INTRAMUSCULAR; INTRAVENOUS PRN
Status: DISCONTINUED | OUTPATIENT
Start: 2023-10-04 | End: 2023-10-04 | Stop reason: SDUPTHER

## 2023-10-04 RX ORDER — ALBUTEROL SULFATE 90 UG/1
AEROSOL, METERED RESPIRATORY (INHALATION) PRN
Status: DISCONTINUED | OUTPATIENT
Start: 2023-10-04 | End: 2023-10-04 | Stop reason: SDUPTHER

## 2023-10-04 RX ORDER — VANCOMYCIN HYDROCHLORIDE 1 G/20ML
INJECTION, POWDER, LYOPHILIZED, FOR SOLUTION INTRAVENOUS PRN
Status: DISCONTINUED | OUTPATIENT
Start: 2023-10-04 | End: 2023-10-04 | Stop reason: HOSPADM

## 2023-10-04 RX ORDER — CEFADROXIL 500 MG/1
500 CAPSULE ORAL 2 TIMES DAILY
Qty: 14 CAPSULE | Refills: 0 | Status: SHIPPED | OUTPATIENT
Start: 2023-10-04 | End: 2023-10-11

## 2023-10-04 RX ORDER — ACETAMINOPHEN 500 MG
1000 TABLET ORAL ONCE
Status: COMPLETED | OUTPATIENT
Start: 2023-10-04 | End: 2023-10-04

## 2023-10-04 RX ORDER — ACETAMINOPHEN 325 MG/1
650 TABLET ORAL
Status: DISCONTINUED | OUTPATIENT
Start: 2023-10-04 | End: 2023-10-04 | Stop reason: HOSPADM

## 2023-10-04 RX ORDER — BUPIVACAINE HYDROCHLORIDE 5 MG/ML
INJECTION, SOLUTION EPIDURAL; INTRACAUDAL
Status: COMPLETED
Start: 2023-10-04 | End: 2023-10-04

## 2023-10-04 RX ORDER — IPRATROPIUM BROMIDE AND ALBUTEROL SULFATE 2.5; .5 MG/3ML; MG/3ML
1 SOLUTION RESPIRATORY (INHALATION)
Status: DISCONTINUED | OUTPATIENT
Start: 2023-10-04 | End: 2023-10-04 | Stop reason: HOSPADM

## 2023-10-04 RX ORDER — SODIUM CHLORIDE 9 MG/ML
INJECTION, SOLUTION INTRAVENOUS CONTINUOUS PRN
Status: DISCONTINUED | OUTPATIENT
Start: 2023-10-04 | End: 2023-10-04 | Stop reason: SDUPTHER

## 2023-10-04 RX ORDER — SUCCINYLCHOLINE/SOD CL,ISO/PF 200MG/10ML
SYRINGE (ML) INTRAVENOUS PRN
Status: DISCONTINUED | OUTPATIENT
Start: 2023-10-04 | End: 2023-10-04 | Stop reason: SDUPTHER

## 2023-10-04 RX ORDER — ONDANSETRON 2 MG/ML
4 INJECTION INTRAMUSCULAR; INTRAVENOUS
Status: COMPLETED | OUTPATIENT
Start: 2023-10-04 | End: 2023-10-04

## 2023-10-04 RX ORDER — EPHEDRINE SULFATE 50 MG/ML
INJECTION INTRAVENOUS PRN
Status: DISCONTINUED | OUTPATIENT
Start: 2023-10-04 | End: 2023-10-04 | Stop reason: SDUPTHER

## 2023-10-04 RX ORDER — ASPIRIN 81 MG/1
81 TABLET, CHEWABLE ORAL 2 TIMES DAILY
Qty: 60 TABLET | Refills: 0 | Status: SHIPPED | OUTPATIENT
Start: 2023-10-04 | End: 2023-11-03

## 2023-10-04 RX ORDER — CELECOXIB 200 MG/1
200 CAPSULE ORAL ONCE
Status: COMPLETED | OUTPATIENT
Start: 2023-10-04 | End: 2023-10-04

## 2023-10-04 RX ORDER — SODIUM CHLORIDE 9 MG/ML
INJECTION, SOLUTION INTRAVENOUS CONTINUOUS
Status: DISCONTINUED | OUTPATIENT
Start: 2023-10-04 | End: 2023-10-04 | Stop reason: HOSPADM

## 2023-10-04 RX ORDER — LORAZEPAM 2 MG/ML
0.5 INJECTION INTRAMUSCULAR
Status: DISCONTINUED | OUTPATIENT
Start: 2023-10-04 | End: 2023-10-04 | Stop reason: HOSPADM

## 2023-10-04 RX ORDER — FENTANYL CITRATE 50 UG/ML
INJECTION, SOLUTION INTRAMUSCULAR; INTRAVENOUS PRN
Status: DISCONTINUED | OUTPATIENT
Start: 2023-10-04 | End: 2023-10-04 | Stop reason: SDUPTHER

## 2023-10-04 RX ORDER — FENTANYL CITRATE 50 UG/ML
25 INJECTION, SOLUTION INTRAMUSCULAR; INTRAVENOUS EVERY 5 MIN PRN
Status: DISCONTINUED | OUTPATIENT
Start: 2023-10-04 | End: 2023-10-04 | Stop reason: HOSPADM

## 2023-10-04 RX ORDER — OXYCODONE HYDROCHLORIDE 5 MG/1
5 TABLET ORAL EVERY 6 HOURS PRN
Qty: 28 TABLET | Refills: 0 | Status: SHIPPED | OUTPATIENT
Start: 2023-10-04 | End: 2023-10-11

## 2023-10-04 RX ORDER — BUPIVACAINE HYDROCHLORIDE 5 MG/ML
INJECTION, SOLUTION EPIDURAL; INTRACAUDAL
Status: COMPLETED | OUTPATIENT
Start: 2023-10-04 | End: 2023-10-04

## 2023-10-04 RX ADMIN — PROPOFOL 40 MG: 10 INJECTION, EMULSION INTRAVENOUS at 07:41

## 2023-10-04 RX ADMIN — CALCIUM CHLORIDE 0.5 G: 100 INJECTION, SOLUTION INTRAVENOUS at 08:30

## 2023-10-04 RX ADMIN — PHENYLEPHRINE HYDROCHLORIDE 100 MCG: 10 INJECTION INTRAVENOUS at 08:13

## 2023-10-04 RX ADMIN — TRANEXAMIC ACID 1000 MG: 100 INJECTION, SOLUTION INTRAVENOUS at 08:01

## 2023-10-04 RX ADMIN — SODIUM CHLORIDE: 900 INJECTION INTRAVENOUS at 12:49

## 2023-10-04 RX ADMIN — SODIUM CHLORIDE: 9 INJECTION, SOLUTION INTRAVENOUS at 08:29

## 2023-10-04 RX ADMIN — ONDANSETRON 4 MG: 2 INJECTION INTRAMUSCULAR; INTRAVENOUS at 08:05

## 2023-10-04 RX ADMIN — EPHEDRINE SULFATE 10 MG: 50 INJECTION INTRAVENOUS at 08:54

## 2023-10-04 RX ADMIN — Medication 160 MG: at 07:50

## 2023-10-04 RX ADMIN — PHENYLEPHRINE HYDROCHLORIDE 100 MCG: 10 INJECTION INTRAVENOUS at 08:10

## 2023-10-04 RX ADMIN — ACETAMINOPHEN 1000 MG: 500 TABLET ORAL at 06:20

## 2023-10-04 RX ADMIN — EPHEDRINE SULFATE 10 MG: 50 INJECTION INTRAVENOUS at 10:06

## 2023-10-04 RX ADMIN — SODIUM CHLORIDE, POTASSIUM CHLORIDE, SODIUM LACTATE AND CALCIUM CHLORIDE: 600; 310; 30; 20 INJECTION, SOLUTION INTRAVENOUS at 06:39

## 2023-10-04 RX ADMIN — BUPIVACAINE HYDROCHLORIDE 30 ML: 5 INJECTION, SOLUTION EPIDURAL; INTRACAUDAL; PERINEURAL at 07:42

## 2023-10-04 RX ADMIN — FENTANYL CITRATE 100 MCG: 50 INJECTION, SOLUTION INTRAMUSCULAR; INTRAVENOUS at 07:50

## 2023-10-04 RX ADMIN — PHENYLEPHRINE HYDROCHLORIDE 100 MCG: 10 INJECTION INTRAVENOUS at 08:08

## 2023-10-04 RX ADMIN — EPHEDRINE SULFATE 10 MG: 50 INJECTION INTRAVENOUS at 09:30

## 2023-10-04 RX ADMIN — ALBUTEROL SULFATE 10 PUFF: 90 AEROSOL, METERED RESPIRATORY (INHALATION) at 09:50

## 2023-10-04 RX ADMIN — EPHEDRINE SULFATE 10 MG: 50 INJECTION INTRAVENOUS at 09:33

## 2023-10-04 RX ADMIN — PHENYLEPHRINE HYDROCHLORIDE 100 MCG: 10 INJECTION INTRAVENOUS at 08:48

## 2023-10-04 RX ADMIN — DEXAMETHASONE SODIUM PHOSPHATE 4 MG: 4 INJECTION, SOLUTION INTRAMUSCULAR; INTRAVENOUS at 08:05

## 2023-10-04 RX ADMIN — EPHEDRINE SULFATE 10 MG: 50 INJECTION INTRAVENOUS at 09:00

## 2023-10-04 RX ADMIN — PROPOFOL 60 MG: 10 INJECTION, EMULSION INTRAVENOUS at 10:08

## 2023-10-04 RX ADMIN — SODIUM CHLORIDE: 9 INJECTION, SOLUTION INTRAVENOUS at 09:42

## 2023-10-04 RX ADMIN — GLYCOPYRROLATE 0.2 MG: 0.2 INJECTION INTRAMUSCULAR; INTRAVENOUS at 08:08

## 2023-10-04 RX ADMIN — CEFAZOLIN 2000 MG: 2 INJECTION, POWDER, FOR SOLUTION INTRAMUSCULAR; INTRAVENOUS at 08:05

## 2023-10-04 RX ADMIN — CALCIUM CHLORIDE 0.5 G: 100 INJECTION, SOLUTION INTRAVENOUS at 08:48

## 2023-10-04 RX ADMIN — CELECOXIB 200 MG: 200 CAPSULE ORAL at 06:21

## 2023-10-04 RX ADMIN — PREGABALIN 75 MG: 75 CAPSULE ORAL at 06:21

## 2023-10-04 RX ADMIN — ONDANSETRON 4 MG: 2 INJECTION INTRAMUSCULAR; INTRAVENOUS at 12:33

## 2023-10-04 RX ADMIN — PHENYLEPHRINE HYDROCHLORIDE 100 MCG: 10 INJECTION INTRAVENOUS at 08:24

## 2023-10-04 RX ADMIN — SUGAMMADEX 200 MG: 100 INJECTION, SOLUTION INTRAVENOUS at 10:08

## 2023-10-04 RX ADMIN — DEXAMETHASONE SODIUM PHOSPHATE 8 MG: 4 INJECTION, SOLUTION INTRAMUSCULAR; INTRAVENOUS at 06:45

## 2023-10-04 RX ADMIN — VANCOMYCIN HYDROCHLORIDE 1500 MG: 10 INJECTION, POWDER, LYOPHILIZED, FOR SOLUTION INTRAVENOUS at 07:02

## 2023-10-04 RX ADMIN — ROCURONIUM BROMIDE 50 MG: 10 INJECTION, SOLUTION INTRAVENOUS at 08:05

## 2023-10-04 RX ADMIN — CEFAZOLIN 2000 MG: 2 INJECTION, POWDER, FOR SOLUTION INTRAMUSCULAR; INTRAVENOUS at 12:52

## 2023-10-04 RX ADMIN — PROPOFOL 200 MG: 10 INJECTION, EMULSION INTRAVENOUS at 07:50

## 2023-10-04 RX ADMIN — TRANEXAMIC ACID 1000 MG: 100 INJECTION, SOLUTION INTRAVENOUS at 09:27

## 2023-10-04 ASSESSMENT — PAIN - FUNCTIONAL ASSESSMENT
PAIN_FUNCTIONAL_ASSESSMENT: ACTIVITIES ARE NOT PREVENTED
PAIN_FUNCTIONAL_ASSESSMENT: 0-10
PAIN_FUNCTIONAL_ASSESSMENT: ACTIVITIES ARE NOT PREVENTED

## 2023-10-04 ASSESSMENT — PAIN SCALES - GENERAL
PAINLEVEL_OUTOF10: 0

## 2023-10-04 ASSESSMENT — PAIN DESCRIPTION - DESCRIPTORS
DESCRIPTORS: ACHING
DESCRIPTORS: ACHING

## 2023-10-04 ASSESSMENT — PAIN DESCRIPTION - ORIENTATION: ORIENTATION: LEFT

## 2023-10-04 ASSESSMENT — PAIN DESCRIPTION - LOCATION: LOCATION: KNEE

## 2023-10-04 NOTE — INTERVAL H&P NOTE
Update History & Physical    The patient's History and Physical of September 27, 2023 was reviewed with the patient and I examined the patient. There was no change. The surgical site was confirmed by the patient and me. Plan: The risks, benefits, expected outcome, and alternative to the recommended procedure have been discussed with the patient. Patient understands and wants to proceed with the procedure.      Electronically signed by Edward Merritt MD on 10/4/2023 at 9:38 AM

## 2023-10-04 NOTE — ANESTHESIA POSTPROCEDURE EVALUATION
Department of Anesthesiology  Postprocedure Note    Patient: Margarette Garber  MRN: [id number]  YOB: 1970  Date of evaluation: 10/4/2023      Procedure Summary     Date: 10/04/23 Room / Location: [facility] OR 09 / The [address] [facility]    Anesthesia Start: 9365 Anesthesia Stop: 0442    Procedure: LEFT MEDIAL UNICOMPARTMENTAL KNEE ARTHROPLASTY (Left: Knee) Diagnosis:       Primary osteoarthritis of left knee      (Primary osteoarthritis of left knee [M17.12])    Surgeons: Angel Manuel MD Responsible Provider: Kevin Wynne MD    Anesthesia Type: general ASA Status: 3          Anesthesia Type: No value filed.     Sebastián Phase I: Sebastián Score: 10    Sebastián Phase II:        Anesthesia Post Evaluation    Patient location during evaluation: PACU  Level of consciousness: awake  Complications: no  Multimodal analgesia pain management approach

## 2023-10-04 NOTE — DISCHARGE INSTRUCTIONS
Eddie Del Angel MD  [facility] Office: [facility], [address] [place] [address] Office: [place], [address]  [address] ([phone]) Mauro Orellana Discharge Instructions  For daytime questions or concerns, please call my clinic: [phone]  For urgent questions after hours call or text me on my cell: [phone]  If unavailable, you can still call the clinic number which will connect you to the on call physician    Please take a baby Aspirin 81mg twice daily for 30 days to prevent blood clots  Weight bearing as tolerated  Resume regular diet    Dressing/Wound Care:  Keep Splint in place. Keep it clean and dry. Please call if splint gets wet or soiled. May remove dressing after 72 hours and replace with a clean dressing  May shower after 72 hours but do NOT submerge (I.e. no hot tubs, tub baths, pools, lakes, ocean ect). Avoid contact with dirty water. You may cover with a dry dressing or leave it open to the air. Keep it clean. Do not apply any cream or lotion to your incision  Please call immediately if any increasing redness or drainage, any fevers. For pain use the following combinations of medications. They work best together and are safe to take in combination as outlined below  -  Remember that swelling increases pain. When able, elevate, wrap with ACE bandage for compression, and use ice to help with swelling  -  Acetaminophen (Tylenol) - this is OK to take in addition to an anti-inflammatory. - Maximum dosing of 1000mg every 6-8 hours (max 3000mg per day)       - Avoid alcohol use as directed on the bottle  -  Anti-inflammatory options - select only one type of anti-inflammatory. All work equally well.  Use what you have available at home.       - Note: avoid this category of medications if you are taking blood thinners, have a history of stomach ulcers, or kidney disease       - Diclofenac - max dose 75mg twice daily (50mg if age over 72)       -

## 2023-10-04 NOTE — ANESTHESIA PROCEDURE NOTES
Peripheral Block    Patient location during procedure: pre-op  Reason for block: post-op pain management and at surgeon's request  Start time: 10/4/2023 7:42 AM  End time: 10/4/2023 7:43 AM  Staffing  Performed: anesthesiologist and resident/CRNA   Anesthesiologist: Jason Olivas MD  Resident/CRNA: JULIA Quintanilla - CRNA  Performed by: Jason Olivas MD  Authorized by: Jason Olivas MD    Preanesthetic Checklist  Completed: patient identified, IV checked, site marked, risks and benefits discussed, surgical/procedural consents, equipment checked, pre-op evaluation, timeout performed, anesthesia consent given, oxygen available, monitors applied/VS acknowledged, fire risk safety assessment completed and verbalized and blood product R/B/A discussed and consented  Peripheral Block   Patient position: supine  Prep: ChloraPrep  Provider prep: mask and sterile gloves  Patient monitoring: cardiac monitor, continuous pulse ox, frequent blood pressure checks and IV access  Block type: Femoral  Adductor canal (Low Femoral)  Laterality: left  Injection technique: single-shot  Guidance: ultrasound guided  Local infiltration: lidocaine  Infiltration strength: 1 %  Local infiltration: lidocaine  Dose: 3 mL    Needle   Needle type: insulated echogenic nerve stimulator needle   Needle gauge: 21 G  Needle localization: ultrasound guidance  Needle length: 10 cm  Assessment   Injection assessment: negative aspiration for heme, no paresthesia on injection, local visualized surrounding nerve on ultrasound and no intravascular symptoms  Paresthesia pain: none  Slow fractionated injection: yes  Hemodynamics: stable  Real-time US image taken/store: yes  Outcomes: uncomplicated    Additional Notes  Time out 0741 am  30 ml 0.5% bupi  40 mg Propofol  No comp.             Medications Administered  bupivacaine (MARCAINE) PF injection 0.5% - Perineural   30 mL - 10/4/2023 7:42:00 AM

## 2023-10-04 NOTE — OP NOTE
dressing. All counts were correct at the end of the case. The patient tolerated the procedure well without complication. Transferred stable to the PACU. POSTOPERATIVE COURSE:   Subvastus, no geniculars  Weight bearing as tolerated. PT for mobility. DVT ppx with ASA   Antibiotic prophylaxis x1 dose. Duricef x7 days  Oxycodone for pain. Follow up in 1-2 weeks for wound check and X-Ray of the knee.          Oralee Show MD [facility]  Office: [phone]  Cell: [phone]    10/04/23  9:48 AM

## 2023-10-04 NOTE — ANESTHESIA PRE PROCEDURE
Department of Anesthesiology  Preprocedure Note       Name:  Deya Peña   Age:  47 y.o.  :  1970                                          MRN:  [id number]         Date:  10/4/2023      Surgeon: Blake Galvan):  Joselyn Barton MD    Procedure: Procedure(s):  LEFT PARTIAL KNEE ARTHROPLASTY VIDA    Medications prior to admission:   Prior to Admission medications    Medication Sig Start Date End Date Taking? Authorizing Provider   metFORMIN (GLUCOPHAGE) 500 MG tablet Take 1 tablet by mouth at bedtime    Juan Fuller MD   aspirin 81 MG chewable tablet Take 1 tablet by mouth in the morning and at bedtime 22   Joselyn Barton MD   metoprolol succinate (TOPROL XL) 100 MG extended release tablet Take 1 tablet by mouth daily  Patient taking differently: Take 0.5 tablets by mouth at bedtime 21   Remedios Howell MD   nitroGLYCERIN (NITROSTAT) 0.4 MG SL tablet up to max of 3 total doses. If no relief after 1 dose, call 911. Patient not taking: Reported on 10/4/2023 12/3/21   JULIA Soriano CNP   lisinopril (PRINIVIL;ZESTRIL) 40 MG tablet Take 1 tablet by mouth daily 21   JULIA Soriano CNP   amLODIPine (NORVASC) 5 MG tablet Take 2 tablets by mouth daily 21   JULIA Soriano CNP   atorvastatin (LIPITOR) 80 MG tablet Take 1 tablet by mouth nightly 11/3/21   JULIA Soriano CNP   Cholecalciferol 50 MCG (2000 UT) CAPS Vitamin D3 50 mcg (2,000 unit) capsule   Take 2 capsules every day by oral route. Juan Fuller MD   Cyanocobalamin (VITAMIN B 12) 100 MCG LOZG Take by mouth    Juan Fuller MD   DULoxetine (CYMBALTA) 60 MG extended release capsule Take 1 capsule by mouth at bedtime    Juan Fuller MD   gabapentin (NEURONTIN) 400 MG capsule Take 1 capsule by mouth 3 times daily.     Juan Fuller MD   omeprazole (PRILOSEC) 20 MG delayed release capsule Take 2 capsules by mouth daily    Jonny

## 2023-10-05 ENCOUNTER — TELEPHONE (OUTPATIENT)
Dept: ORTHOPEDIC SURGERY | Age: 53
End: 2023-10-05

## 2023-10-05 NOTE — TELEPHONE ENCOUNTER
Called patient for postoperative evaluation after Left knee surgery 10/04/23 with Dr. Devorah Asif. I got disconnected after asking for patient with my first call, and when I called back I got a voicemail. Left message for patient instructing him to call for any questions or concerns.     Jeremiah Leong  Ortho Nurse Navigator  [phone]

## 2023-10-24 ENCOUNTER — TELEPHONE (OUTPATIENT)
Dept: ORTHOPEDIC SURGERY | Age: 53
End: 2023-10-24

## 2024-05-16 ENCOUNTER — HOSPITAL ENCOUNTER (INPATIENT)
Age: 54
LOS: 1 days | Discharge: HOME | DRG: 287 | End: 2024-05-17
Attending: EMERGENCY MEDICINE | Admitting: FAMILY MEDICINE
Payer: MEDICARE

## 2024-05-16 ENCOUNTER — APPOINTMENT (OUTPATIENT)
Dept: GENERAL RADIOLOGY | Age: 54
DRG: 287 | End: 2024-05-16
Payer: MEDICARE

## 2024-05-16 ENCOUNTER — APPOINTMENT (OUTPATIENT)
Dept: CT IMAGING | Age: 54
DRG: 287 | End: 2024-05-16
Payer: MEDICARE

## 2024-05-16 ENCOUNTER — APPOINTMENT (OUTPATIENT)
Dept: CT IMAGING | Age: 54
DRG: 287 | End: 2024-05-16
Attending: EMERGENCY MEDICINE
Payer: MEDICARE

## 2024-05-16 DIAGNOSIS — I25.9 CHEST PAIN DUE TO MYOCARDIAL ISCHEMIA, UNSPECIFIED ISCHEMIC CHEST PAIN TYPE: ICD-10-CM

## 2024-05-16 DIAGNOSIS — I24.9 ACUTE CORONARY SYNDROME (HCC): ICD-10-CM

## 2024-05-16 DIAGNOSIS — R93.89 ABNORMAL COMPUTED TOMOGRAPHY ANGIOGRAPHY (CTA): ICD-10-CM

## 2024-05-16 DIAGNOSIS — R07.9 CHEST PAIN, UNSPECIFIED TYPE: Primary | ICD-10-CM

## 2024-05-16 DIAGNOSIS — Z03.89 UNDER OBSERVATION FOR SUSPECTED CORONARY ARTERY DISEASE: ICD-10-CM

## 2024-05-16 PROBLEM — I25.10 LAD STENOSIS: Status: ACTIVE | Noted: 2024-05-16

## 2024-05-16 LAB
ANION GAP SERPL CALCULATED.3IONS-SCNC: 11 MMOL/L (ref 3–16)
BASOPHILS # BLD: 0.1 K/UL (ref 0–0.2)
BASOPHILS NFR BLD: 1.1 %
BUN SERPL-MCNC: 11 MG/DL (ref 7–20)
CALCIUM SERPL-MCNC: 9.2 MG/DL (ref 8.3–10.6)
CHLORIDE SERPL-SCNC: 101 MMOL/L (ref 99–110)
CO2 SERPL-SCNC: 26 MMOL/L (ref 21–32)
CREAT SERPL-MCNC: 0.9 MG/DL (ref 0.9–1.3)
DEPRECATED RDW RBC AUTO: 14.1 % (ref 12.4–15.4)
EKG ATRIAL RATE: 79 BPM
EKG DIAGNOSIS: NORMAL
EKG P AXIS: 20 DEGREES
EKG P-R INTERVAL: 130 MS
EKG Q-T INTERVAL: 372 MS
EKG QRS DURATION: 76 MS
EKG QTC CALCULATION (BAZETT): 426 MS
EKG R AXIS: 5 DEGREES
EKG T AXIS: 25 DEGREES
EKG VENTRICULAR RATE: 79 BPM
EOSINOPHIL # BLD: 0.1 K/UL (ref 0–0.6)
EOSINOPHIL NFR BLD: 1.6 %
GFR SERPLBLD CREATININE-BSD FMLA CKD-EPI: >90 ML/MIN/{1.73_M2}
GLUCOSE SERPL-MCNC: 98 MG/DL (ref 70–99)
HCT VFR BLD AUTO: 40.5 % (ref 40.5–52.5)
HGB BLD-MCNC: 13.8 G/DL (ref 13.5–17.5)
LYMPHOCYTES # BLD: 1.2 K/UL (ref 1–5.1)
LYMPHOCYTES NFR BLD: 20.5 %
MCH RBC QN AUTO: 29.4 PG (ref 26–34)
MCHC RBC AUTO-ENTMCNC: 34.1 G/DL (ref 31–36)
MCV RBC AUTO: 86.2 FL (ref 80–100)
MONOCYTES # BLD: 0.4 K/UL (ref 0–1.3)
MONOCYTES NFR BLD: 6.9 %
NEUTROPHILS # BLD: 4.1 K/UL (ref 1.7–7.7)
NEUTROPHILS NFR BLD: 69.9 %
NT-PROBNP SERPL-MCNC: 42 PG/ML (ref 0–124)
PLATELET # BLD AUTO: 202 K/UL (ref 135–450)
PMV BLD AUTO: 8.4 FL (ref 5–10.5)
POTASSIUM SERPL-SCNC: 4 MMOL/L (ref 3.5–5.1)
RBC # BLD AUTO: 4.7 M/UL (ref 4.2–5.9)
SODIUM SERPL-SCNC: 138 MMOL/L (ref 136–145)
TROPONIN, HIGH SENSITIVITY: 12 NG/L (ref 0–22)
TROPONIN, HIGH SENSITIVITY: 7 NG/L (ref 0–22)
TROPONIN, HIGH SENSITIVITY: 8 NG/L (ref 0–22)
WBC # BLD AUTO: 5.9 K/UL (ref 4–11)

## 2024-05-16 PROCEDURE — 93005 ELECTROCARDIOGRAM TRACING: CPT | Performed by: EMERGENCY MEDICINE

## 2024-05-16 PROCEDURE — 75580 N-INVAS EST C FFR SW ALY CTA: CPT

## 2024-05-16 PROCEDURE — 99223 1ST HOSP IP/OBS HIGH 75: CPT | Performed by: INTERNAL MEDICINE

## 2024-05-16 PROCEDURE — 36415 COLL VENOUS BLD VENIPUNCTURE: CPT

## 2024-05-16 PROCEDURE — 84484 ASSAY OF TROPONIN QUANT: CPT

## 2024-05-16 PROCEDURE — 1200000000 HC SEMI PRIVATE

## 2024-05-16 PROCEDURE — 75574 CT ANGIO HRT W/3D IMAGE: CPT

## 2024-05-16 PROCEDURE — 6370000000 HC RX 637 (ALT 250 FOR IP): Performed by: EMERGENCY MEDICINE

## 2024-05-16 PROCEDURE — 99285 EMERGENCY DEPT VISIT HI MDM: CPT

## 2024-05-16 PROCEDURE — 96374 THER/PROPH/DIAG INJ IV PUSH: CPT

## 2024-05-16 PROCEDURE — 2500000003 HC RX 250 WO HCPCS: Performed by: RADIOLOGY

## 2024-05-16 PROCEDURE — 71045 X-RAY EXAM CHEST 1 VIEW: CPT

## 2024-05-16 PROCEDURE — 6360000004 HC RX CONTRAST MEDICATION: Performed by: EMERGENCY MEDICINE

## 2024-05-16 PROCEDURE — 80048 BASIC METABOLIC PNL TOTAL CA: CPT

## 2024-05-16 PROCEDURE — 2580000003 HC RX 258: Performed by: FAMILY MEDICINE

## 2024-05-16 PROCEDURE — 85025 COMPLETE CBC W/AUTO DIFF WBC: CPT

## 2024-05-16 PROCEDURE — 6370000000 HC RX 637 (ALT 250 FOR IP): Performed by: FAMILY MEDICINE

## 2024-05-16 PROCEDURE — 83880 ASSAY OF NATRIURETIC PEPTIDE: CPT

## 2024-05-16 RX ORDER — POLYETHYLENE GLYCOL 3350 17 G/17G
17 POWDER, FOR SOLUTION ORAL DAILY PRN
Status: DISCONTINUED | OUTPATIENT
Start: 2024-05-16 | End: 2024-05-18 | Stop reason: HOSPADM

## 2024-05-16 RX ORDER — POTASSIUM CHLORIDE 7.45 MG/ML
10 INJECTION INTRAVENOUS PRN
Status: DISCONTINUED | OUTPATIENT
Start: 2024-05-16 | End: 2024-05-18 | Stop reason: HOSPADM

## 2024-05-16 RX ORDER — POTASSIUM CHLORIDE 20 MEQ/1
40 TABLET, EXTENDED RELEASE ORAL PRN
Status: DISCONTINUED | OUTPATIENT
Start: 2024-05-16 | End: 2024-05-18 | Stop reason: HOSPADM

## 2024-05-16 RX ORDER — SODIUM CHLORIDE 9 MG/ML
INJECTION, SOLUTION INTRAVENOUS PRN
Status: DISCONTINUED | OUTPATIENT
Start: 2024-05-16 | End: 2024-05-18 | Stop reason: HOSPADM

## 2024-05-16 RX ORDER — ATORVASTATIN CALCIUM 40 MG/1
40 TABLET, FILM COATED ORAL NIGHTLY
Status: DISCONTINUED | OUTPATIENT
Start: 2024-05-16 | End: 2024-05-18 | Stop reason: HOSPADM

## 2024-05-16 RX ORDER — NITROGLYCERIN 0.4 MG/1
0.4 TABLET SUBLINGUAL EVERY 5 MIN PRN
Status: DISCONTINUED | OUTPATIENT
Start: 2024-05-16 | End: 2024-05-18 | Stop reason: HOSPADM

## 2024-05-16 RX ORDER — METOPROLOL TARTRATE 1 MG/ML
5 INJECTION, SOLUTION INTRAVENOUS EVERY 5 MIN PRN
Status: DISCONTINUED | OUTPATIENT
Start: 2024-05-16 | End: 2024-05-16 | Stop reason: CLARIF

## 2024-05-16 RX ORDER — ACETAMINOPHEN 325 MG/1
650 TABLET ORAL EVERY 6 HOURS PRN
Status: DISCONTINUED | OUTPATIENT
Start: 2024-05-16 | End: 2024-05-17 | Stop reason: SDUPTHER

## 2024-05-16 RX ORDER — ASPIRIN 81 MG/1
81 TABLET, CHEWABLE ORAL DAILY
Status: DISCONTINUED | OUTPATIENT
Start: 2024-05-17 | End: 2024-05-18 | Stop reason: HOSPADM

## 2024-05-16 RX ORDER — SODIUM CHLORIDE 0.9 % (FLUSH) 0.9 %
5-40 SYRINGE (ML) INJECTION EVERY 12 HOURS SCHEDULED
Status: DISCONTINUED | OUTPATIENT
Start: 2024-05-16 | End: 2024-05-18 | Stop reason: HOSPADM

## 2024-05-16 RX ORDER — METOPROLOL SUCCINATE 50 MG/1
50 TABLET, EXTENDED RELEASE ORAL NIGHTLY
Status: DISCONTINUED | OUTPATIENT
Start: 2024-05-16 | End: 2024-05-18 | Stop reason: HOSPADM

## 2024-05-16 RX ORDER — HYDRALAZINE HYDROCHLORIDE 20 MG/ML
5 INJECTION INTRAMUSCULAR; INTRAVENOUS EVERY 6 HOURS PRN
Status: DISCONTINUED | OUTPATIENT
Start: 2024-05-16 | End: 2024-05-18 | Stop reason: HOSPADM

## 2024-05-16 RX ORDER — LISINOPRIL 40 MG/1
40 TABLET ORAL DAILY
Status: DISCONTINUED | OUTPATIENT
Start: 2024-05-17 | End: 2024-05-18 | Stop reason: HOSPADM

## 2024-05-16 RX ORDER — ACETAMINOPHEN 650 MG/1
650 SUPPOSITORY RECTAL EVERY 6 HOURS PRN
Status: DISCONTINUED | OUTPATIENT
Start: 2024-05-16 | End: 2024-05-17 | Stop reason: SDUPTHER

## 2024-05-16 RX ORDER — DULOXETIN HYDROCHLORIDE 60 MG/1
60 CAPSULE, DELAYED RELEASE ORAL NIGHTLY
Status: DISCONTINUED | OUTPATIENT
Start: 2024-05-16 | End: 2024-05-18 | Stop reason: HOSPADM

## 2024-05-16 RX ORDER — NITROGLYCERIN 0.4 MG/1
0.4 TABLET SUBLINGUAL ONCE
Status: DISCONTINUED | OUTPATIENT
Start: 2024-05-16 | End: 2024-05-16 | Stop reason: HOSPADM

## 2024-05-16 RX ORDER — ENOXAPARIN SODIUM 100 MG/ML
40 INJECTION SUBCUTANEOUS NIGHTLY
Status: DISCONTINUED | OUTPATIENT
Start: 2024-05-16 | End: 2024-05-18 | Stop reason: HOSPADM

## 2024-05-16 RX ORDER — AMLODIPINE BESYLATE 10 MG/1
10 TABLET ORAL DAILY
Status: DISCONTINUED | OUTPATIENT
Start: 2024-05-17 | End: 2024-05-18 | Stop reason: HOSPADM

## 2024-05-16 RX ORDER — SODIUM CHLORIDE 0.9 % (FLUSH) 0.9 %
5-40 SYRINGE (ML) INJECTION PRN
Status: DISCONTINUED | OUTPATIENT
Start: 2024-05-16 | End: 2024-05-18 | Stop reason: HOSPADM

## 2024-05-16 RX ORDER — PANTOPRAZOLE SODIUM 40 MG/1
40 TABLET, DELAYED RELEASE ORAL
Status: DISCONTINUED | OUTPATIENT
Start: 2024-05-17 | End: 2024-05-18 | Stop reason: HOSPADM

## 2024-05-16 RX ORDER — GABAPENTIN 400 MG/1
400 CAPSULE ORAL 3 TIMES DAILY
Status: DISCONTINUED | OUTPATIENT
Start: 2024-05-16 | End: 2024-05-18 | Stop reason: HOSPADM

## 2024-05-16 RX ORDER — MORPHINE SULFATE 4 MG/ML
4 INJECTION INTRAVENOUS
Status: ACTIVE | OUTPATIENT
Start: 2024-05-16 | End: 2024-05-17

## 2024-05-16 RX ORDER — PROMETHAZINE HYDROCHLORIDE 12.5 MG/1
12.5 TABLET ORAL EVERY 6 HOURS PRN
Status: DISCONTINUED | OUTPATIENT
Start: 2024-05-16 | End: 2024-05-18 | Stop reason: HOSPADM

## 2024-05-16 RX ORDER — MAGNESIUM SULFATE IN WATER 40 MG/ML
2000 INJECTION, SOLUTION INTRAVENOUS PRN
Status: DISCONTINUED | OUTPATIENT
Start: 2024-05-16 | End: 2024-05-18 | Stop reason: HOSPADM

## 2024-05-16 RX ORDER — ONDANSETRON 2 MG/ML
4 INJECTION INTRAMUSCULAR; INTRAVENOUS EVERY 6 HOURS PRN
Status: DISCONTINUED | OUTPATIENT
Start: 2024-05-16 | End: 2024-05-18 | Stop reason: HOSPADM

## 2024-05-16 RX ADMIN — SODIUM CHLORIDE, PRESERVATIVE FREE 10 ML: 5 INJECTION INTRAVENOUS at 22:24

## 2024-05-16 RX ADMIN — ATORVASTATIN CALCIUM 40 MG: 40 TABLET, FILM COATED ORAL at 22:23

## 2024-05-16 RX ADMIN — NITROGLYCERIN 0.5 INCH: 20 OINTMENT TOPICAL at 10:00

## 2024-05-16 RX ADMIN — METOPROLOL TARTRATE 5 MG: 1 INJECTION, SOLUTION INTRAVENOUS at 15:59

## 2024-05-16 RX ADMIN — METOPROLOL TARTRATE 50 MG: 25 TABLET, FILM COATED ORAL at 13:53

## 2024-05-16 RX ADMIN — IOPAMIDOL 100 ML: 755 INJECTION, SOLUTION INTRAVENOUS at 15:51

## 2024-05-16 ASSESSMENT — PAIN SCALES - GENERAL
PAINLEVEL_OUTOF10: 5
PAINLEVEL_OUTOF10: 1
PAINLEVEL_OUTOF10: 5

## 2024-05-16 ASSESSMENT — PAIN DESCRIPTION - ORIENTATION
ORIENTATION: LEFT

## 2024-05-16 ASSESSMENT — PAIN DESCRIPTION - ONSET: ONSET: ON-GOING

## 2024-05-16 ASSESSMENT — PAIN DESCRIPTION - DESCRIPTORS
DESCRIPTORS: HEAVINESS
DESCRIPTORS: PRESSURE
DESCRIPTORS: HEAVINESS

## 2024-05-16 ASSESSMENT — LIFESTYLE VARIABLES
HOW MANY STANDARD DRINKS CONTAINING ALCOHOL DO YOU HAVE ON A TYPICAL DAY: PATIENT DOES NOT DRINK
HOW OFTEN DO YOU HAVE A DRINK CONTAINING ALCOHOL: NEVER

## 2024-05-16 ASSESSMENT — PAIN DESCRIPTION - LOCATION
LOCATION: CHEST

## 2024-05-16 ASSESSMENT — PAIN DESCRIPTION - FREQUENCY: FREQUENCY: INTERMITTENT

## 2024-05-16 ASSESSMENT — PAIN - FUNCTIONAL ASSESSMENT
PAIN_FUNCTIONAL_ASSESSMENT: ACTIVITIES ARE NOT PREVENTED
PAIN_FUNCTIONAL_ASSESSMENT: ACTIVITIES ARE NOT PREVENTED
PAIN_FUNCTIONAL_ASSESSMENT: 0-10

## 2024-05-16 ASSESSMENT — PAIN DESCRIPTION - PAIN TYPE: TYPE: ACUTE PAIN

## 2024-05-16 NOTE — ED NOTES
ED TO INPATIENT SBAR HANDOFF    Patient Name: Heriberto Aldana   :  1970  53 y.o.   MRN:  [id number]  Preferred Name    ED Room #:  A10/A10-10  Family/Caregiver Present yes   Restraints no   Sitter no   Sepsis Risk Score Sepsis Risk Score: 0.48    Situation  Code Status: Prior No additional code details.    Allergies: Codeine  Weight: Patient Vitals for the past 96 hrs (Last 3 readings):   Weight   24 0841 95.3 kg (210 lb)     Arrived from: home  Chief Complaint:   Chief Complaint   Patient presents with    Chest Pain     Pt endorses L sided chest pain that began at 0800 this morning. Pt states he was sitting down when the pain began. Pt received nitro and 324aspirin en route. Pt states the medications have relieved his pain. Pt endorses SOB that began when the CP did. Pt endorses HX of HTN and high cholesterol. Pt has HX of CVA and has known blocked arteries in heart      Hospital Problem/Diagnosis:  Active Problems:    * No active hospital problems. *  Resolved Problems:    * No resolved hospital problems. *    Imaging:   HEARTFLOW FFR FRACTIONAL FLOW RESERVE ANALYSIS   Final Result      1. The moderate stenosis in the mid LAD demonstrates a low likelihood of lesion specific ischemia with an FFR CT value of 0.81.   2. Distal LAD demonstrates an FFR CT value of 0.76. No lesion specific ischemia. Gradual decrease in the FFR CT value compatible with serial stenosis in the mid LAD.   3. Nondiagnostic evaluation of the right coronary artery.      Electronically signed by Ludwig Vega MD      CTA CARDIAC W C STRC MORP W CONTRAST   Final Result      1. Total calcium score 174.   2. Right coronary artery dominance.   3. Multiple serial calcific plaques in the mid LAD with moderate stenosis adjacent to the third diagonal coronary artery. Stenosis estimated at 50-70%.   4. Poor visualization of the right coronary artery with no definite stenosis identified.      CAD RADS 3, moderate stenosis LAD.      The

## 2024-05-16 NOTE — ED PROVIDER NOTES
THE [facility]  EMERGENCY DEPARTMENT ENCOUNTER          ATTENDING PHYSICIAN NOTE       Date of evaluation: 5/16/2024    ADDENDUM:      Care of this patient was assumed from Dr Morgan.  The patient was seen for Chest Pain (Pt endorses L sided chest pain that began at 0800 this morning. Pt states he was sitting down when the pain began. Pt received nitro and 324aspirin en route. Pt states the medications have relieved his pain. Pt endorses SOB that began when the CP did. Pt endorses HX of HTN and high cholesterol. Pt has HX of CVA and has known blocked arteries in heart )  .  The patient's initial evaluation and plan have been discussed with the prior provider who initially evaluated the patient.  Nursing Notes, Past Medical Hx, Past Surgical Hx, Social Hx, Allergies, and Family Hx were all reviewed.    ASSESSMENT / PLAN  (MEDICAL DECISION MAKING)     Heriberto Aldana is a 53 y.o. male who presented with chest pain that began at rest today.  EKG was nonischemic, troponins within normal limits, and after cardiology consultation patient was pending a coronary CTA.  CTA shows 50 to 70% LAD lesion, CAD score 3 100 patient moderate risk and per cardiology's initial recommendation leading to admission.  Spoke to hospitalist and they will admit.  He has received aspirin already today.    Is this patient to be included in the SEP-1 core measure? No Exclusion criteria - the patient is NOT to be included for SEP-1 Core Measure due to: Infection is not suspected    Medical Decision Making  Amount and/or Complexity of Data Reviewed  Labs: ordered.  Radiology: ordered.  ECG/medicine tests: ordered.    Risk  Prescription drug management.  Decision regarding hospitalization.        Critical Care:  Due to the immediate potential for life-threatening deterioration due to chest pain, I spent 31 minutes providing critical care.  This time excludes time spent performing procedures but includes time spent on direct patient care, history

## 2024-05-16 NOTE — CONSULTS
Cardiology Consultation                                                                    Pt Name: Heriberto Aldana  Age: 53 y.o.  Sex: male  : 1970  Location: [facility]    Referring Physician: Isaac Morgan MD  Primary cardiologist: Dr Paredes      Reason for Consult:     Reason for Consultation/Chief Complaint: chest pain    HPI:      Heriberto Aldana is a 53 y.o. male with a past medical history of mild CAD, HLP, DM.     LHC 2021: Mid LAD 40%, otherwise luminal irregularities, LVEF 60%.    Echo 10/2023: Normal    Nuclear stress 2023: Normal    Patient presented to the emergency room today with sudden onset of midsternal chest pain, 7/10 intensity, pressure-like, unrelated to positional changes or deep breathing while sitting in his recliner.  Patient denies any recent ischemic or congestive symptoms at rest or with exertion, denies any URI or GERD.  EMS gave him aspirin and 1 nitro start which helped pain intensity down to 4/10.  At the emergency room ECG and chest x-ray normal.  High-sensitivity troponin normal x 2 (7, 8).  During my interview, patient continues to have mild 4/10 midsternal chest pain.      Histories     Past Medical History:   has a past medical history of Ambulates with cane, Arthritis, CAD (coronary artery disease), Diabetes mellitus (HCC), Hx of blood clots, Hyperlipidemia, Hypertension, Long term current use of oral hypoglycemic drug, CHRISTOPHER (obstructive sleep apnea), Stroke (HCC), Wears glasses, and Wears glasses.    Surgical History:   has a past surgical history that includes knee surgery; hernia repair; Elbow surgery (Left); Knee arthroscopy (Left, 2022); shoulder surgery (Left); Colonoscopy; and Total knee arthroplasty (Left, 10/4/2023).     Social History:   reports that he has never smoked. He has never used smokeless tobacco. He reports that he does not drink alcohol and does not use drugs.     Family History:  No evidence for sudden cardiac death or premature  significant CAD, patient can be discharged home.  If however there is significant CAD, patient will need to be admitted for NPO x meds after midnight and C tomorrow.          I have personally reviewed the reports and images of labs, radiological studies, cardiac studies including ECG's and telemetry, current and old medical records. The note was completed using EMR and Dragon dictation system. Every effort was made to ensure accuracy; however, inadvertent computerized transcription errors may be present.    All questions and concerns were addressed to the patient/family. Alternatives to my treatment were discussed.     I would like to thank you for providing me the opportunity to participate in the care of your patient. If you have any questions, please do not hesitate to contact me.     Danny Magaña MD, [facility], [facility]  [facility] [place] [address]  Ph: [phone]  Fax: [phone]

## 2024-05-16 NOTE — PROGRESS NOTES
Patient arrived alert and orientated x 4, on stretcher.   CTA Coronary study done without complication. See flow sheets for VS.   Patient was taken back to the ER in stable condition

## 2024-05-16 NOTE — ED PROVIDER NOTES
THE [facility]  EMERGENCY DEPARTMENT ENCOUNTER          ATTENDING PHYSICIAN NOTE       Date of evaluation: 5/16/2024    Chief Complaint     Chest Pain (Pt endorses L sided chest pain that began at 0800 this morning. Pt states he was sitting down when the pain began. Pt received nitro and 324aspirin en route. Pt states the medications have relieved his pain. Pt endorses SOB that began when the CP did. Pt endorses HX of HTN and high cholesterol. Pt has HX of CVA and has known blocked arteries in heart )      History of Present Illness     Heriberto Aldana is a 53 y.o. male who presents to the [facility] with chest pain left-sided started 8:00 this morning.  He was sitting down when the pain began.  He received aspirin and nitro and route.  Feeling better at this time prior to the pain he was having at 8 AM.  He did complain of some dyspnea/shortness of breath when pain began.  His history is not significant is significant for coronary artery disease hypertension hyperlipidemia since CVA.  Patient stated he has some blocked arteries in his heart    2023 stress at    Nuclear Stress Test:Pharmacological, NM MYOCARDIAL SPECT REST EXERCISE OR   RX      Study location: [facility]      Indications: Chest pain.                 Hospital Status: Inpatient.     Height: 68 inches Weight: 226 pounds      Conclusions      Summary   Nondiagnostic lexiscan ecg.   There is normal isotope uptake at stress and rest. There is no evidence of   myocardial ischemia or scar.   Normal LV systolic function. Left ventricular ejection fraction of 71%.        ASSESSMENT / PLAN  (MEDICAL DECISION MAKING)     INITIAL VITALS: BP: (!) 136/99, Temp: 97.9 °F (36.6 °C), Pulse: 77, Respirations: 16, SpO2: 100 %      Heriberto Aldana is a 53 y.o. male who presents to the [facility] with chest pain left-sided started 8:00 this morning.  He was sitting down when the pain began.  He received aspirin and nitro and route.  MCG (2000 UT) CAPS    Vitamin D3 50 mcg (2,000 unit) capsule   Take 2 capsules every day by oral route.    CYANOCOBALAMIN (VITAMIN B 12) 100 MCG LOZG    Take by mouth    DULOXETINE (CYMBALTA) 60 MG EXTENDED RELEASE CAPSULE    Take 1 capsule by mouth at bedtime    GABAPENTIN (NEURONTIN) 400 MG CAPSULE    Take 1 capsule by mouth 3 times daily.    LISINOPRIL (PRINIVIL;ZESTRIL) 40 MG TABLET    Take 1 tablet by mouth daily    METFORMIN (GLUCOPHAGE) 500 MG TABLET    Take 1 tablet by mouth at bedtime    METOPROLOL SUCCINATE (TOPROL XL) 100 MG EXTENDED RELEASE TABLET    Take 1 tablet by mouth daily    NITROGLYCERIN (NITROSTAT) 0.4 MG SL TABLET    up to max of 3 total doses. If no relief after 1 dose, call 911.    OMEPRAZOLE (PRILOSEC) 20 MG DELAYED RELEASE CAPSULE    Take 2 capsules by mouth daily    SENNA-DOCUSATE (PERICOLACE) 8.6-50 MG PER TABLET    Take 2 tablets by mouth daily as needed for Constipation       Allergies     He is allergic to codeine.    Physical Exam     INITIAL VITALS: BP: (!) 136/99, Temp: 97.9 °F (36.6 °C), Pulse: 77, Respirations: 16, SpO2: 100 %   Physical Exam  Vitals and nursing note reviewed.   Constitutional:       General: He is not in acute distress.     Appearance: He is well-developed.   HENT:      Head: Normocephalic.   Eyes:      Pupils: Pupils are equal, round, and reactive to light.   Cardiovascular:      Rate and Rhythm: Normal rate.   Pulmonary:      Effort: Pulmonary effort is normal.   Abdominal:      Palpations: Abdomen is soft.   Musculoskeletal:         General: Normal range of motion.      Cervical back: Neck supple.   Skin:     General: Skin is warm and dry.   Neurological:      General: No focal deficit present.      Mental Status: He is alert and oriented to person, place, and time.   Psychiatric:         Behavior: Behavior normal.                    Isaac Morgan MD  05/16/24 [id number]

## 2024-05-17 ENCOUNTER — APPOINTMENT (OUTPATIENT)
Age: 54
DRG: 287 | End: 2024-05-17
Attending: FAMILY MEDICINE
Payer: MEDICARE

## 2024-05-17 VITALS
BODY MASS INDEX: 31.83 KG/M2 | HEIGHT: 68 IN | OXYGEN SATURATION: 96 % | HEART RATE: 83 BPM | SYSTOLIC BLOOD PRESSURE: 138 MMHG | DIASTOLIC BLOOD PRESSURE: 97 MMHG | WEIGHT: 210 LBS | RESPIRATION RATE: 17 BRPM | TEMPERATURE: 97.8 F

## 2024-05-17 LAB
ALBUMIN SERPL-MCNC: 4.2 G/DL (ref 3.4–5)
ALBUMIN/GLOB SERPL: 1.4 {RATIO} (ref 1.1–2.2)
ALP SERPL-CCNC: 98 U/L (ref 40–129)
ALT SERPL-CCNC: 18 U/L (ref 10–40)
ANION GAP SERPL CALCULATED.3IONS-SCNC: 13 MMOL/L (ref 3–16)
AST SERPL-CCNC: 16 U/L (ref 15–37)
BILIRUB SERPL-MCNC: 0.7 MG/DL (ref 0–1)
BUN SERPL-MCNC: 13 MG/DL (ref 7–20)
CALCIUM SERPL-MCNC: 9.2 MG/DL (ref 8.3–10.6)
CHLORIDE SERPL-SCNC: 101 MMOL/L (ref 99–110)
CHOLEST SERPL-MCNC: 129 MG/DL (ref 0–199)
CO2 SERPL-SCNC: 26 MMOL/L (ref 21–32)
CREAT SERPL-MCNC: 1 MG/DL (ref 0.9–1.3)
DEPRECATED RDW RBC AUTO: 13.6 % (ref 12.4–15.4)
ECHO AO ROOT DIAM: 3.1 CM
ECHO AO ROOT INDEX: 1.48 CM/M2
ECHO AV AREA PEAK VELOCITY: 2.1 CM2
ECHO AV AREA/BSA PEAK VELOCITY: 1 CM2/M2
ECHO AV PEAK GRADIENT: 7 MMHG
ECHO AV PEAK VELOCITY: 1.4 M/S
ECHO AV VELOCITY RATIO: 0.71
ECHO BSA: 2.14 M2
ECHO BSA: 2.14 M2
ECHO LA AREA 2C: 10.6 CM2
ECHO LA AREA 4C: 17.1 CM2
ECHO LA MAJOR AXIS: 5.5 CM
ECHO LA MINOR AXIS: 4.3 CM
ECHO LA VOL MOD A2C: 21 ML (ref 18–58)
ECHO LA VOL MOD A4C: 42 ML (ref 18–58)
ECHO LA VOLUME INDEX MOD A2C: 10 ML/M2 (ref 16–34)
ECHO LA VOLUME INDEX MOD A4C: 20 ML/M2 (ref 16–34)
ECHO LV E' LATERAL VELOCITY: 11 CM/S
ECHO LV E' SEPTAL VELOCITY: 5 CM/S
ECHO LV EDV A4C: 68 ML
ECHO LV EDV INDEX A4C: 33 ML/M2
ECHO LV EJECTION FRACTION A4C: 57 %
ECHO LV ESV A4C: 29 ML
ECHO LV ESV INDEX A4C: 14 ML/M2
ECHO LV FRACTIONAL SHORTENING: 16 % (ref 28–44)
ECHO LV INTERNAL DIMENSION DIASTOLE INDEX: 1.77 CM/M2
ECHO LV INTERNAL DIMENSION DIASTOLIC: 3.7 CM (ref 4.2–5.9)
ECHO LV INTERNAL DIMENSION SYSTOLIC INDEX: 1.48 CM/M2
ECHO LV INTERNAL DIMENSION SYSTOLIC: 3.1 CM
ECHO LV IVSD: 1.2 CM (ref 0.6–1)
ECHO LV MASS 2D: 120.8 G (ref 88–224)
ECHO LV MASS INDEX 2D: 57.8 G/M2 (ref 49–115)
ECHO LV POSTERIOR WALL DIASTOLIC: 0.9 CM (ref 0.6–1)
ECHO LV POSTERIOR WALL SYSTOLIC: 0.7 CM
ECHO LV RELATIVE WALL THICKNESS RATIO: 0.49
ECHO LVOT AREA: 2.8 CM2
ECHO LVOT DIAM: 1.9 CM
ECHO LVOT PEAK GRADIENT: 4 MMHG
ECHO LVOT PEAK VELOCITY: 1 M/S
ECHO MV A VELOCITY: 0.89 M/S
ECHO MV E DECELERATION TIME (DT): 232 MS
ECHO MV E VELOCITY: 0.72 M/S
ECHO MV E/A RATIO: 0.81
ECHO MV E/E' LATERAL: 6.55
ECHO MV E/E' RATIO (AVERAGED): 10.47
ECHO MV E/E' SEPTAL: 14.4
ECHO PV MAX VELOCITY: 1 M/S
ECHO PV PEAK GRADIENT: 4 MMHG
ECHO RA AREA 4C: 9.9 CM2
ECHO RA END SYSTOLIC VOLUME APICAL 4 CHAMBER INDEX BSA: 10 ML/M2
ECHO RA VOLUME: 21 ML
ECHO RV FREE WALL PEAK S': 15 CM/S
ECHO RV TAPSE: 2.1 CM (ref 1.7–?)
EKG ATRIAL RATE: 72 BPM
EKG DIAGNOSIS: NORMAL
EKG P AXIS: 32 DEGREES
EKG P-R INTERVAL: 138 MS
EKG Q-T INTERVAL: 398 MS
EKG QRS DURATION: 80 MS
EKG QTC CALCULATION (BAZETT): 435 MS
EKG R AXIS: 18 DEGREES
EKG T AXIS: 38 DEGREES
EKG VENTRICULAR RATE: 72 BPM
GFR SERPLBLD CREATININE-BSD FMLA CKD-EPI: >90 ML/MIN/{1.73_M2}
GLUCOSE BLD-MCNC: 135 MG/DL (ref 70–99)
GLUCOSE BLD-MCNC: 86 MG/DL (ref 70–99)
GLUCOSE BLD-MCNC: 94 MG/DL (ref 70–99)
GLUCOSE SERPL-MCNC: 90 MG/DL (ref 70–99)
HCT VFR BLD AUTO: 41.3 % (ref 40.5–52.5)
HDLC SERPL-MCNC: 37 MG/DL (ref 40–60)
HGB BLD-MCNC: 14.2 G/DL (ref 13.5–17.5)
LDLC SERPL CALC-MCNC: 58 MG/DL
MCH RBC QN AUTO: 30 PG (ref 26–34)
MCHC RBC AUTO-ENTMCNC: 34.5 G/DL (ref 31–36)
MCV RBC AUTO: 87.1 FL (ref 80–100)
PERFORMED ON: ABNORMAL
PERFORMED ON: NORMAL
PERFORMED ON: NORMAL
PLATELET # BLD AUTO: 208 K/UL (ref 135–450)
PMV BLD AUTO: 8.2 FL (ref 5–10.5)
POTASSIUM SERPL-SCNC: 4.4 MMOL/L (ref 3.5–5.1)
PROT SERPL-MCNC: 7.1 G/DL (ref 6.4–8.2)
RBC # BLD AUTO: 4.74 M/UL (ref 4.2–5.9)
SODIUM SERPL-SCNC: 140 MMOL/L (ref 136–145)
TRIGL SERPL-MCNC: 170 MG/DL (ref 0–150)
VLDLC SERPL CALC-MCNC: 34 MG/DL
WBC # BLD AUTO: 7.7 K/UL (ref 4–11)

## 2024-05-17 PROCEDURE — 4A023N7 MEASUREMENT OF CARDIAC SAMPLING AND PRESSURE, LEFT HEART, PERCUTANEOUS APPROACH: ICD-10-PCS | Performed by: INTERNAL MEDICINE

## 2024-05-17 PROCEDURE — 36415 COLL VENOUS BLD VENIPUNCTURE: CPT

## 2024-05-17 PROCEDURE — 6360000002 HC RX W HCPCS: Performed by: INTERNAL MEDICINE

## 2024-05-17 PROCEDURE — 7100000011 HC PHASE II RECOVERY - ADDTL 15 MIN: Performed by: INTERNAL MEDICINE

## 2024-05-17 PROCEDURE — 93306 TTE W/DOPPLER COMPLETE: CPT | Performed by: INTERNAL MEDICINE

## 2024-05-17 PROCEDURE — 6370000000 HC RX 637 (ALT 250 FOR IP): Performed by: INTERNAL MEDICINE

## 2024-05-17 PROCEDURE — 85610 PROTHROMBIN TIME: CPT

## 2024-05-17 PROCEDURE — 7100000010 HC PHASE II RECOVERY - FIRST 15 MIN: Performed by: INTERNAL MEDICINE

## 2024-05-17 PROCEDURE — B2111ZZ FLUOROSCOPY OF MULTIPLE CORONARY ARTERIES USING LOW OSMOLAR CONTRAST: ICD-10-PCS | Performed by: INTERNAL MEDICINE

## 2024-05-17 PROCEDURE — C1769 GUIDE WIRE: HCPCS | Performed by: INTERNAL MEDICINE

## 2024-05-17 PROCEDURE — 2580000003 HC RX 258: Performed by: INTERNAL MEDICINE

## 2024-05-17 PROCEDURE — 93005 ELECTROCARDIOGRAM TRACING: CPT | Performed by: FAMILY MEDICINE

## 2024-05-17 PROCEDURE — 99153 MOD SED SAME PHYS/QHP EA: CPT | Performed by: INTERNAL MEDICINE

## 2024-05-17 PROCEDURE — 2709999900 HC NON-CHARGEABLE SUPPLY: Performed by: INTERNAL MEDICINE

## 2024-05-17 PROCEDURE — 85027 COMPLETE CBC AUTOMATED: CPT

## 2024-05-17 PROCEDURE — 80061 LIPID PANEL: CPT

## 2024-05-17 PROCEDURE — 2580000003 HC RX 258: Performed by: FAMILY MEDICINE

## 2024-05-17 PROCEDURE — 1200000000 HC SEMI PRIVATE

## 2024-05-17 PROCEDURE — 6370000000 HC RX 637 (ALT 250 FOR IP): Performed by: FAMILY MEDICINE

## 2024-05-17 PROCEDURE — 6360000002 HC RX W HCPCS: Performed by: FAMILY MEDICINE

## 2024-05-17 PROCEDURE — C1894 INTRO/SHEATH, NON-LASER: HCPCS | Performed by: INTERNAL MEDICINE

## 2024-05-17 PROCEDURE — 99152 MOD SED SAME PHYS/QHP 5/>YRS: CPT | Performed by: INTERNAL MEDICINE

## 2024-05-17 PROCEDURE — 6360000004 HC RX CONTRAST MEDICATION: Performed by: INTERNAL MEDICINE

## 2024-05-17 PROCEDURE — 80053 COMPREHEN METABOLIC PANEL: CPT

## 2024-05-17 PROCEDURE — 93306 TTE W/DOPPLER COMPLETE: CPT

## 2024-05-17 PROCEDURE — 93010 ELECTROCARDIOGRAM REPORT: CPT | Performed by: INTERNAL MEDICINE

## 2024-05-17 PROCEDURE — 99233 SBSQ HOSP IP/OBS HIGH 50: CPT | Performed by: INTERNAL MEDICINE

## 2024-05-17 PROCEDURE — 93458 L HRT ARTERY/VENTRICLE ANGIO: CPT | Performed by: INTERNAL MEDICINE

## 2024-05-17 PROCEDURE — 2500000003 HC RX 250 WO HCPCS: Performed by: INTERNAL MEDICINE

## 2024-05-17 RX ORDER — INSULIN LISPRO 100 [IU]/ML
0-4 INJECTION, SOLUTION INTRAVENOUS; SUBCUTANEOUS NIGHTLY
Status: DISCONTINUED | OUTPATIENT
Start: 2024-05-17 | End: 2024-05-18 | Stop reason: HOSPADM

## 2024-05-17 RX ORDER — SODIUM CHLORIDE 9 MG/ML
INJECTION, SOLUTION INTRAVENOUS CONTINUOUS
Status: ACTIVE | OUTPATIENT
Start: 2024-05-17 | End: 2024-05-17

## 2024-05-17 RX ORDER — ASPIRIN 81 MG/1
243 TABLET, CHEWABLE ORAL
Status: COMPLETED | OUTPATIENT
Start: 2024-05-17 | End: 2024-05-17

## 2024-05-17 RX ORDER — INSULIN LISPRO 100 [IU]/ML
0-4 INJECTION, SOLUTION INTRAVENOUS; SUBCUTANEOUS
Status: DISCONTINUED | OUTPATIENT
Start: 2024-05-17 | End: 2024-05-18 | Stop reason: HOSPADM

## 2024-05-17 RX ORDER — FENTANYL CITRATE 50 UG/ML
INJECTION, SOLUTION INTRAMUSCULAR; INTRAVENOUS PRN
Status: DISCONTINUED | OUTPATIENT
Start: 2024-05-17 | End: 2024-05-17 | Stop reason: HOSPADM

## 2024-05-17 RX ORDER — MIDAZOLAM HYDROCHLORIDE 1 MG/ML
INJECTION INTRAMUSCULAR; INTRAVENOUS PRN
Status: DISCONTINUED | OUTPATIENT
Start: 2024-05-17 | End: 2024-05-17 | Stop reason: HOSPADM

## 2024-05-17 RX ORDER — SODIUM CHLORIDE 9 MG/ML
INJECTION, SOLUTION INTRAVENOUS CONTINUOUS
Status: DISCONTINUED | OUTPATIENT
Start: 2024-05-17 | End: 2024-05-17

## 2024-05-17 RX ORDER — GLUCAGON 1 MG/ML
1 KIT INJECTION PRN
Status: DISCONTINUED | OUTPATIENT
Start: 2024-05-17 | End: 2024-05-18 | Stop reason: HOSPADM

## 2024-05-17 RX ORDER — ACETAMINOPHEN 325 MG/1
650 TABLET ORAL EVERY 4 HOURS PRN
Status: DISCONTINUED | OUTPATIENT
Start: 2024-05-17 | End: 2024-05-18 | Stop reason: HOSPADM

## 2024-05-17 RX ORDER — HEPARIN SODIUM 1000 [USP'U]/ML
INJECTION, SOLUTION INTRAVENOUS; SUBCUTANEOUS PRN
Status: DISCONTINUED | OUTPATIENT
Start: 2024-05-17 | End: 2024-05-17 | Stop reason: HOSPADM

## 2024-05-17 RX ORDER — SODIUM CHLORIDE 0.9 % (FLUSH) 0.9 %
5-40 SYRINGE (ML) INJECTION EVERY 12 HOURS SCHEDULED
Status: DISCONTINUED | OUTPATIENT
Start: 2024-05-17 | End: 2024-05-18 | Stop reason: HOSPADM

## 2024-05-17 RX ORDER — METOPROLOL SUCCINATE 50 MG/1
50 TABLET, EXTENDED RELEASE ORAL NIGHTLY
Qty: 30 TABLET | Refills: 3 | Status: SHIPPED | OUTPATIENT
Start: 2024-05-17

## 2024-05-17 RX ORDER — SODIUM CHLORIDE 9 MG/ML
INJECTION, SOLUTION INTRAVENOUS CONTINUOUS
Status: DISCONTINUED | OUTPATIENT
Start: 2024-05-17 | End: 2024-05-18 | Stop reason: HOSPADM

## 2024-05-17 RX ORDER — SODIUM CHLORIDE 0.9 % (FLUSH) 0.9 %
5-40 SYRINGE (ML) INJECTION PRN
Status: DISCONTINUED | OUTPATIENT
Start: 2024-05-17 | End: 2024-05-18 | Stop reason: HOSPADM

## 2024-05-17 RX ORDER — DEXTROSE MONOHYDRATE 100 MG/ML
INJECTION, SOLUTION INTRAVENOUS CONTINUOUS PRN
Status: DISCONTINUED | OUTPATIENT
Start: 2024-05-17 | End: 2024-05-18 | Stop reason: HOSPADM

## 2024-05-17 RX ADMIN — GABAPENTIN 400 MG: 400 CAPSULE ORAL at 20:37

## 2024-05-17 RX ADMIN — DULOXETINE HYDROCHLORIDE 60 MG: 60 CAPSULE, DELAYED RELEASE ORAL at 20:38

## 2024-05-17 RX ADMIN — DULOXETINE HYDROCHLORIDE 60 MG: 60 CAPSULE, DELAYED RELEASE ORAL at 00:34

## 2024-05-17 RX ADMIN — LISINOPRIL 40 MG: 40 TABLET ORAL at 09:03

## 2024-05-17 RX ADMIN — GABAPENTIN 400 MG: 400 CAPSULE ORAL at 00:34

## 2024-05-17 RX ADMIN — SODIUM CHLORIDE, PRESERVATIVE FREE 10 ML: 5 INJECTION INTRAVENOUS at 20:38

## 2024-05-17 RX ADMIN — PANTOPRAZOLE SODIUM 40 MG: 40 TABLET, DELAYED RELEASE ORAL at 06:32

## 2024-05-17 RX ADMIN — PANTOPRAZOLE SODIUM 40 MG: 40 TABLET, DELAYED RELEASE ORAL at 16:37

## 2024-05-17 RX ADMIN — SODIUM CHLORIDE: 9 INJECTION, SOLUTION INTRAVENOUS at 10:45

## 2024-05-17 RX ADMIN — ASPIRIN 81 MG: 81 TABLET, CHEWABLE ORAL at 09:03

## 2024-05-17 RX ADMIN — ASPIRIN 243 MG: 81 TABLET, CHEWABLE ORAL at 13:16

## 2024-05-17 RX ADMIN — ATORVASTATIN CALCIUM 40 MG: 40 TABLET, FILM COATED ORAL at 20:37

## 2024-05-17 RX ADMIN — SODIUM CHLORIDE: 9 INJECTION, SOLUTION INTRAVENOUS at 14:39

## 2024-05-17 RX ADMIN — SODIUM CHLORIDE, PRESERVATIVE FREE 10 ML: 5 INJECTION INTRAVENOUS at 20:40

## 2024-05-17 RX ADMIN — METOPROLOL SUCCINATE 50 MG: 50 TABLET, EXTENDED RELEASE ORAL at 20:38

## 2024-05-17 RX ADMIN — AMLODIPINE BESYLATE 10 MG: 10 TABLET ORAL at 09:03

## 2024-05-17 RX ADMIN — GABAPENTIN 400 MG: 400 CAPSULE ORAL at 14:58

## 2024-05-17 RX ADMIN — ENOXAPARIN SODIUM 40 MG: 100 INJECTION SUBCUTANEOUS at 20:37

## 2024-05-17 RX ADMIN — GABAPENTIN 400 MG: 400 CAPSULE ORAL at 09:03

## 2024-05-17 RX ADMIN — SODIUM CHLORIDE, PRESERVATIVE FREE 10 ML: 5 INJECTION INTRAVENOUS at 09:04

## 2024-05-17 RX ADMIN — METOPROLOL SUCCINATE 50 MG: 50 TABLET, EXTENDED RELEASE ORAL at 00:34

## 2024-05-17 ASSESSMENT — PAIN SCALES - GENERAL: PAINLEVEL_OUTOF10: 0

## 2024-05-17 ASSESSMENT — PAIN - FUNCTIONAL ASSESSMENT
PAIN_FUNCTIONAL_ASSESSMENT: ACTIVITIES ARE NOT PREVENTED
PAIN_FUNCTIONAL_ASSESSMENT: 0-10

## 2024-05-17 ASSESSMENT — PAIN DESCRIPTION - DESCRIPTORS: DESCRIPTORS: HEAVINESS

## 2024-05-17 NOTE — DISCHARGE SUMMARY
definite, at least moderate atherosclerotic plaque, with mild coronary artery  disease highly likely and significant narrowings possible.. LEFT MAIN: There is normal origin from the left sinus of Valsalva. There is no plaque or stenosis. The left main bifurcates into the circumflex and LAD. LAD: Proximal LAD densities no stenosis. Small first diagonal coronary artery. Mid right coronary artery demonstrates serial calcific plaques with mild soft plaque component resulting in 2 segments of mild stenosis and a segment of moderate stenosis adjacent to the third diagonal coronary artery estimated at 50-70%. CIRCUMFLEX: Circumflex coronary artery origin is intact. Motion artifact limits evaluation of the circumflex coronary artery. There is a large obtuse marginal no stenosis. Distal circumflex coronary arteries obscured by motion and small size. RCA: There is normal origin from the right sinus of Valsalva. Motion artifact limits evaluation of the right coronary artery. No definite stenosis identified. Posterior lateral and posterior descending coronary arteries appear well opacified. HEART AND VALVES: Size of the heart is within normal limits. There is no pericardial abnormality. There is a trileaflet aortic valve. There is no aortica valvular calcification. The aortic root and ascending aorta are normal in caliber. OTHER FINDINGS: Minimal dependent atelectasis in the lower lungs. Visualized upper abdomen is unremarkable. No acute osseous abnormality.     1. Total calcium score 174. 2. Right coronary artery dominance. 3. Multiple serial calcific plaques in the mid LAD with moderate stenosis adjacent to the third diagonal coronary artery. Stenosis estimated at 50-70%. 4. Poor visualization of the right coronary artery with no definite stenosis identified. CAD RADS 3, moderate stenosis LAD. The patient has observed coronary artery disease of unknown functional significance and meets the BMI criteria as noted in the  electronic medical record. Consideration of the patient's risk factors, symptoms, and abnormal findings on diagnostic imaging of heart and coronary circulation, it is recommended to send the coronary CT angiogram study for Heartflow FFR CT analysis. After data is sent to [facility], interpretation and analysis are generally available in one business day and can be found in the electronic medical record is a separate report. Electronically signed by Ludwig Vega MD    XR CHEST PORTABLE    Result Date: 5/16/2024  Chest History: Pain Number of views: 1     Low lung volumes. Clear lungs. Normal cardiomediastinal silhouette.       CBC:   Recent Labs     05/16/24  0852 05/17/24 0356   WBC 5.9 7.7   HGB 13.8 14.2    208     BMP:    Recent Labs     05/16/24  0852 05/17/24 0356    140   K 4.0 4.4    101   CO2 26 26   BUN 11 13   CREATININE 0.9 1.0   GLUCOSE 98 90     Hepatic:   Recent Labs     05/17/24 0356   AST 16   ALT 18   BILITOT 0.7   ALKPHOS 98     Lipids:   Lab Results   Component Value Date/Time    CHOL 129 05/17/2024 03:56 AM    HDL 37 05/17/2024 03:56 AM    TRIG 170 05/17/2024 03:56 AM     Hemoglobin A1C:   Lab Results   Component Value Date/Time    LABA1C 6.2 09/18/2023 02:28 PM     TSH:   Lab Results   Component Value Date/Time    TSH 1.02 08/31/2021 03:28 PM     Troponin: No results found for: \"TROPONINT\"  Lactic Acid: No results for input(s): \"LACTA\" in the last 72 hours.  BNP:   Recent Labs     05/16/24 0852   PROBNP 42     UA:  Lab Results   Component Value Date/Time    NITRU Negative 09/18/2023 02:29 PM    COLORU Yellow 09/18/2023 02:29 PM    PHUR 6.5 09/18/2023 02:29 PM    CLARITYU Clear 09/18/2023 02:29 PM    LEUKOCYTESUR Negative 09/18/2023 02:29 PM    UROBILINOGEN 0.2 09/18/2023 02:29 PM    BILIRUBINUR Negative 09/18/2023 02:29 PM    BLOODU Negative 09/18/2023 02:29 PM    GLUCOSEU Negative 09/18/2023 02:29 PM    KETUA Negative 09/18/2023 02:29 PM     Urine Cultures: No results

## 2024-05-17 NOTE — PROGRESS NOTES
1520- 2cc removed from TR band. VSS. No new drainage noted. 11cc remaining in TR band.   1535- 2cc removed from TR band. VSS. No new drainage noted. 9cc remaining in TR band.   1550- 2cc removed from TR band. VSS. No new drainage noted. 7cc remaining in TR band.   1605- 2cc removed from TR band. VSS. No new drainage noted. 5cc remaining in TR band.   1620- Notable bleeding at R radial site. 2cc's injected back into TR band. 7cc in TR band at this time. VSS.   1635- 2cc removed from TR band. VSS. No new drainage noted. 5cc remaining in TR band.   1650- 2cc removed from TR band. VSS. No new drainage noted. 3cc remaining in TR band.   1705- 2cc removed from TR band. VSS. No new drainage noted. 1cc remaining in TR band.   1720- 1cc removed from TR band. VSS. No new drainage noted. 0cc remaining in TR band. TR band to remain in place for 15 mins prior to removing per order.   1735- TR band removed. VSS. Tegaderm placed over R radial site, R wrist splint remain in place. No new bleeding or hematoma noted.

## 2024-05-17 NOTE — H&P
Hospital Medicine History & Physical      Date of Admission: 5/16/2024    Date of Service:  Pt seen/examined on 5/16/2024    [x]Admitted to Inpatient with expected LOS greater than two midnights due to medical therapy.  []Placed in Observation status.    Chief Admission Complaint: Chest pain    Presenting Admission History:      53 y.o. male who presented to [facility] with LAD stenosis.  PMHx significant for previous stents CAD hypertension diabetes neuropathic pain.      Reports left-sided chest pressure radiates into left arm associated with shortness of breath.  States it feels like when he had to have stents placed before.  Denies abdominal pain nausea vomiting.     While in the ER  Troponin nonzero but negative x 2  EKG showed PACs  BNP nonzero but negative    CTA cardiac shows Total calcium score 174./Right coronary artery dominance./Multiple serial calcific plaques in the mid LAD with moderate stenosis adjacent to the third diagonal coronary artery. Stenosis estimated at 50-70%./Poor visualization of the right coronary artery with no definite stenosis identified.    Heart flow analysis shows The moderate stenosis in the mid LAD demonstrates a low likelihood of lesion specific ischemia with an FFR CT value of 0.81./Distal LAD demonstrates an FFR CT value of 0.76. No lesion specific ischemia. Gradual decrease in the FFR CT value compatible with serial stenosis in the mid LAD./Nondiagnostic evaluation of the right coronary artery.    Per ER: Cardiology planning on cardiac cath and asked to admit    Assessment/Plan:      Current Principal Problem:  LAD stenosis    Chest pain/LAD stenosis  -Trend troponin  -Aspirin  -Statin  -Cardiac monitor  -Cardiology consult  -N.p.o. at midnight    History of hypertension  -Continue home lisinopril amlodipine metoprolol    Neuropathic pain  -Continue home Cymbalta gabapentin    Diabetes  -Sliding scale insulin    Discussed management of the case in detail w/ the Emergency  Circumflex coronary artery origin is intact. Motion artifact limits evaluation of the circumflex coronary artery. There is a large obtuse marginal no stenosis. Distal circumflex coronary arteries obscured by motion and small size. RCA: There is normal origin from the right sinus of Valsalva. Motion artifact limits evaluation of the right coronary artery. No definite stenosis identified. Posterior lateral and posterior descending coronary arteries appear well opacified. HEART AND VALVES: Size of the heart is within normal limits. There is no pericardial abnormality. There is a trileaflet aortic valve. There is no aortica valvular calcification. The aortic root and ascending aorta are normal in caliber. OTHER FINDINGS: Minimal dependent atelectasis in the lower lungs. Visualized upper abdomen is unremarkable. No acute osseous abnormality.     1. Total calcium score 174. 2. Right coronary artery dominance. 3. Multiple serial calcific plaques in the mid LAD with moderate stenosis adjacent to the third diagonal coronary artery. Stenosis estimated at 50-70%. 4. Poor visualization of the right coronary artery with no definite stenosis identified. CAD RADS 3, moderate stenosis LAD. The patient has observed coronary artery disease of unknown functional significance and meets the BMI criteria as noted in the electronic medical record. Consideration of the patient's risk factors, symptoms, and abnormal findings on diagnostic imaging of heart and coronary circulation, it is recommended to send the coronary CT angiogram study for Heartflow FFR CT analysis. After data is sent to [employer], interpretation and analysis are generally available in one business day and can be found in the electronic medical record is a separate report. Electronically signed by Ludwig Vega MD    XR CHEST PORTABLE    Result Date: 5/16/2024  Chest History: Pain Number of views: 1     Low lung volumes. Clear lungs. Normal cardiomediastinal silhouette.

## 2024-05-17 NOTE — PROGRESS NOTES
Cardiology Consult Service  Daily Progress Note        Admit Date:  5/16/2024  Primary cardiologist: Dr Paredes    Reason for Consultation/Chief Complaint: chest pain    Subjective:     Heriberto Aldana is a 53 y.o. male with a past medical history of mild CAD, HLP, DM.      LHC 08/2021: Mid LAD 40%, otherwise luminal irregularities, LVEF 60%.     Echo 10/2023: Normal     Nuclear stress 03/2023: Normal     Patient presented to the emergency room today with sudden onset of midsternal chest pain, 7/10 intensity, pressure-like, unrelated to positional changes or deep breathing while sitting in his recliner.  Patient denies any recent ischemic or congestive symptoms at rest or with exertion, denies any URI or GERD.  EMS gave him aspirin and 1 nitro start which helped pain intensity down to 4/10.  At the emergency room ECG and chest x-ray normal.  High-sensitivity troponin normal x 2 (7, 8).  During my interview, patient continues to have mild 4/10 midsternal chest pain.    Coronary CTA 5/16/2024: Multiple serial calcific plaques in the mid LAD with stenosis of 50-70%, poor visualization of the RCA with no definitive stenosis, circumflex unremarkable, total calcium score 174.  Heartflow FFR of mid LAD 0.81, distal LAD 0.76.    Interval history:  Patient continues to have mild midsternal chest discomfort.  He remains n.p.o. for possible cath later today.    Objective:     Medications:   insulin lispro  0-4 Units SubCUTAneous TID WC    insulin lispro  0-4 Units SubCUTAneous Nightly    sodium chloride flush  5-40 mL IntraVENous 2 times per day    aspirin  81 mg Oral Daily    atorvastatin  40 mg Oral Nightly    enoxaparin  40 mg SubCUTAneous Nightly    amLODIPine  10 mg Oral Daily    lisinopril  40 mg Oral Daily    metoprolol succinate  50 mg Oral Nightly    pantoprazole  40 mg Oral BID AC    DULoxetine  60 mg Oral Nightly    gabapentin  400 mg Oral TID       IV drips:   dextrose      sodium chloride 100 mL/hr at 05/17/24 1049  present.    All questions and concerns were addressed to the patient/family. Alternatives to my treatment were discussed.     Thank you for allowing to us to participate in the care or Heriberto Aldana. Please call our service with questions.    Danny Magaña MD, [facility], [facility] [place] [id number]  Ph: [phone]  Fax: [phone]

## 2024-05-17 NOTE — PROGRESS NOTES
V2.0    [facility] Progress Note      Name:  Heriberto Aldana /Age/Sex: 1970  (53 y.o. male)   MRN & CSN:  [id number] & [id number] Encounter Date/Time: 2024 11:56 AM EDT   Location:  [facility]/3512-01 PCP: Colleen Prabhakar MD     Attending:Barry Puente MD       Hospital Day: 2    Assessment and Recommendations   Heriberto Aldana is a 53 y.o. male who presented to the emergency room with complaints of chest pain.  Patient states he was sitting down when the pain began.  Pain was improved with nitroglycerin and aspirin.    In the emergency room cardiology was consulted and the patient underwent CTA cardiac which demonstrated total calcium score 174 with right coronary artery dominance, multiple serial calcific plaques in the mid LAD with moderate stenosis adjacent to the third diagonal coronary artery.  Stenosis estimated 50 to 70%.    Patient was admitted and plan for left heart cath today    Plan:   Chest pain-LAD stenosis-trend troponin, aspirin, statin, left heart cath on May 17  Hypertension-continue lisinopril amlodipine and metoprolol  Neuropathic pain-continue Cymbalta and gabapentin  Diabetes-sliding scale insulin      Diet Diet NPO Exceptions are: Sips of Water with Meds, Ice Chips   DVT Prophylaxis [] Lovenox, []  Heparin, [] SCDs, [] Ambulation,  [] Eliquis, [] Xarelto  [] Coumadin   Code Status Full Code   Disposition From: Home  Expected Disposition: Home  Estimated Date of Discharge: 1 to 2 days  Patient requires continued admission due to chest pain   Surrogate Decision Maker/ POA  Per EMR     Personally reviewed Lab Studies and Imaging   2024  PT/INR, CMP, CBC, point-of-care glucose testing reviewed        Imaging that was interpreted personally includes chest x-ray and results demonstrate no acute cardiopulmonary findings    On 2024 drugs that require monitoring for toxicity include insulin and the method of monitoring was POCT glucose to evaluate for hypoglycemia.

## 2024-05-17 NOTE — PRE SEDATION
Sedation Plan  ASA: class 2 - patient with mild systemic disease     Mallampati class: II - soft palate, uvula, fauces visible.    Sedation plan: local anesthesia and moderate (conscious sedation)    Risks, benefits, and alternatives discussed with patient.        Immediate reassessment prior to sedation:  Patient's status reviewed and vital signs assessed; acceptable to perform procedure and proceed to administer sedation as planned.    LHC, possible PCI  Risks, benefits and alternatives to cardiac cath and possible intervention were discussed with the patient including but not limited to bleeding, heart attack, stroke, kidney failure and limb loss.

## 2024-05-17 NOTE — PROGRESS NOTES
4 Eyes Skin Assessment     NAME:  Heriberto Aldana  YOB: 1970  MEDICAL RECORD NUMBER:  [id number]    The patient is being assessed for  Admission    I agree that at least one RN has performed a thorough Head to Toe Skin Assessment on the patient. ALL assessment sites listed below have been assessed.      Areas assessed by both nurses: HERBER Galindo & HERBER Byrne    Head, Face, Ears, Shoulders, Back, Chest, Arms, Elbows, Hands, Sacrum. Buttock, Coccyx, Ischium, Legs. Feet and Heels, and Under Medical Devices         Does the Patient have a Wound? No noted wound(s)       Vladimir Prevention initiated by RN: No  Wound Care Orders initiated by RN: No    Pressure Injury (Stage 3,4, Unstageable, DTI, NWPT, and Complex wounds) if present, place Wound referral order by RN under : No    New Ostomies, if present place, Ostomy referral order under : No     Nurse 1 eSignature: Electronically signed by Josie Reynolds RN on 5/17/24 at 3:07 AM EDT    **SHARE this note so that the co-signing nurse can place an eSignature**    Nurse 2 eSignature: {Esignature:[id number]}

## 2024-05-17 NOTE — CARE COORDINATION
Case Management Assessment  Initial Evaluation    Date/Time of Evaluation: 5/17/2024 8:34 AM  Assessment Completed by: DIONICIO Fuentes   for [facility] ([facility])  [employer] Mobile: [phone]      If patient is discharged prior to next notation, then this note serves as note for discharge by case management.    Patient Name: Heriberto Aldana                   YOB: 1970  Diagnosis: Acute coronary syndrome (HCC) [I24.9]  LAD stenosis [I25.10]  Under observation for suspected coronary artery disease [Z03.89]  Abnormal computed tomography angiography (CTA) [R93.89]  Chest pain, unspecified type [R07.9]                   Date / Time: 5/16/2024  8:33 AM    Patient Admission Status: Inpatient   Readmission Risk (Low < 19, Mod (19-27), High > 27): Readmission Risk Score: 5.7    Current PCP: Colleen Prabhakar MD  PCP verified by CM? Yes    Chart Reviewed: Yes      History Provided by: Patient  Patient Orientation: Alert and Oriented    Patient Cognition: Alert    Hospitalization in the last 30 days (Readmission):  No    If yes, Readmission Assessment in CM Navigator will be completed.    Advance Directives:      Code Status: Full Code   Patient's Primary Decision Maker is: Legal Next of Kin    Primary Decision Maker: WINTER ALDANA - Spouse - [phone]    Discharge Planning:    Patient lives with: Spouse/Significant Other, Children Type of Home: House  Primary Care Giver: Self  Patient Support Systems include: Spouse/Significant Other, Children   Current Financial resources: Medicare, Medicaid ([employer] Medicare and Medicaid secondary)  Current community resources: None  Current services prior to admission: Durable Medical Equipment            Current DME: Walker, [employer]            Type of Home Care services:  None    ADLS  Prior functional level: Independent in ADLs/IADLs  Current functional level: Independent in ADLs/IADLs    PT AM-PAC:   /24  OT AM-PAC:

## 2024-05-17 NOTE — PROGRESS NOTES
1432: Pt arrived back to unit from CVR, TR band in place with 13cc  1435: 2cc removed from TR band, VSS  1450: Notable bleeding at radial site. 2cc's injected back in. VSS.

## 2024-05-17 NOTE — PROGRESS NOTES
MD in to speak with patient and family regarding procedural results. Procedure team called report to floor nurse and transported patient back to room.

## 2024-05-17 NOTE — PROGRESS NOTES
Pt arrived to room 3512. Pt skin was assessed by HERBER Galindo & HERBER Byrne. Pt bed is in the lowest position, wheels are locked, call light and bedside table are in reach. Pt VSS and orthostatic b/p were assessed. Pt educated on BCC q4 hour vital signs and to use the call light for assistance.RN contacted Beata Sarmiento via perfect [employer] regarding pt Lovenox injection. Conversation goes stated below    HERBER Galindo : Dariusz, pt has a lovenox injection that was due prior to being admitted on this floor. Pt has a heart cath scheduled for tomorrow. Would you like for me to administer the lovenox or hold it? Please advise. Thank you.     Beata Sarmiento: Hold

## 2024-05-17 NOTE — PLAN OF CARE
Problem: Pain  Goal: Verbalizes/displays adequate comfort level or baseline comfort level  Outcome: Progressing  Flowsheets (Taken 5/17/2024 0400)  Verbalizes/displays adequate comfort level or baseline comfort level:   Encourage patient to monitor pain and request assistance   Assess pain using appropriate pain scale   Administer analgesics based on type and severity of pain and evaluate response   Implement non-pharmacological measures as appropriate and evaluate response   Consider cultural and social influences on pain and pain management   Notify Licensed Independent Practitioner if interventions unsuccessful or patient reports new pain     Problem: Safety - Adult  Goal: Free from fall injury  Outcome: Progressing  Flowsheets (Taken 5/17/2024 0400)  Free From Fall Injury:   Instruct family/caregiver on patient safety   Based on caregiver fall risk screen, instruct family/caregiver to ask for assistance with transferring infant if caregiver noted to have fall risk factors     Problem: ABCDS Injury Assessment  Goal: Absence of physical injury  Outcome: Progressing  Flowsheets (Taken 5/17/2024 0400)  Absence of Physical Injury: Implement safety measures based on patient assessment

## 2024-05-17 NOTE — PROGRESS NOTES
Cath Lab Pre Procedure Flowsheet    Plan of Care:     Hemodynamics and cardiac rhythm will remain stable.   Comfort level will be maintained.   Respiratory function will remain adequate.   Pt/family will verbalize understanding of the procedure.   Procedure will be tolerated without complications.   Patient will recover from procedure without complications.   ID armband on patient and identification verified.   Informed consent obtained.   Non invasive blood pressure cuff applied, monitoring initiated.   EKG pads and pulse oximeter applied, monitoring initiated.   Instructions given. Patient and / or family verbalize understanding.   H&P will [facility].     Pre-procedure:    NPO Status: Pt has been NPO since midnight. .    Pregnancy Test: No.    Prep Sites: Wrist(s)  Groin(s)    Pulses: Bilat radial 2, Bilat DP 2, Right PT 2    Zac's Test: Adequate blood flow    Pre SBP:    Pre EKG Rhythm: Sinus Rhythm    Pre-procedure blood work collected by: POC PT/INR done.     IV access: PIV #18 in LAC, PIV #20 RAC    Prior Cath/CABG on Chart: Yes    Echo Date: 5/17/2024    Stress Test Date: N/A    EKG Date: 5/17/2024    Anticoagulants: None.     Antiplatelets: Aspirin. Last Dose: 5/17/2024.  Any missed doses:  Unknown..     Chief Complaint:  Chest Pain and Dyspnea    Admit Source: Inpatient    Admission Date:  5/16/2024. TIME:  0833    Surgeries Planned: No    Bleeding Problems: No    Medication Compliance Issues: No    Hypertension: Yes    Dyslipidemia: Yes    Family History of CAD: Unknown    Prior MI: No    Prior PCI: Yes. Most Recent Date:  Unknown     Prior CABG: No    Cerebral Vascular Disease: No    Peripheral Arterial Disease: No    Chronic Lung Disease: No    Tobacco: Never    Diabetic: Yes, Oral Treatment    Cardiac Arrest: No    Dialysis: No    Frailty Score: 2 WELL (no active disease symptoms, active and exercise occasionally)    Recreational Drug Use: No

## 2024-05-17 NOTE — PLAN OF CARE
Problem: Pain  Goal: Verbalizes/displays adequate comfort level or baseline comfort level  5/17/2024 1542 by Eleanor Hicks RN  Outcome: Progressing  5/17/2024 0400 by Josie Reynolds RN  Outcome: Progressing  Flowsheets (Taken 5/17/2024 0400)  Verbalizes/displays adequate comfort level or baseline comfort level:   Encourage patient to monitor pain and request assistance   Assess pain using appropriate pain scale   Administer analgesics based on type and severity of pain and evaluate response   Implement non-pharmacological measures as appropriate and evaluate response   Consider cultural and social influences on pain and pain management   Notify Licensed Independent Practitioner if interventions unsuccessful or patient reports new pain     Problem: Safety - Adult  Goal: Free from fall injury  5/17/2024 1542 by Eleanor Hicks RN  Outcome: Progressing  5/17/2024 0400 by Josie Reynolds RN  Outcome: Progressing  Flowsheets (Taken 5/17/2024 0400)  Free From Fall Injury:   Instruct family/caregiver on patient safety   Based on caregiver fall risk screen, instruct family/caregiver to ask for assistance with transferring infant if caregiver noted to have fall risk factors     Problem: ABCDS Injury Assessment  Goal: Absence of physical injury  5/17/2024 1542 by Eleanor Hicks RN  Outcome: Progressing  5/17/2024 0400 by Josie Reynolds RN  Outcome: Progressing  Flowsheets (Taken 5/17/2024 0400)  Absence of Physical Injury: Implement safety measures based on patient assessment     Problem: Chronic Conditions and Co-morbidities  Goal: Patient's chronic conditions and co-morbidity symptoms are monitored and maintained or improved  Outcome: Progressing

## 2024-05-17 NOTE — ACP (ADVANCE CARE PLANNING)
Advance Care Planning     General Advance Care Planning (ACP) Conversation    Date of Conversation: 5/17/2024  Conducted with: Patient with Decision Making Capacity  Other persons present: None    Healthcare Decision Maker:   Primary Decision Maker: WINTER CLARK - Spouse - [phone]  Click here to complete Healthcare Decision Makers including selection of the Healthcare Decision Maker Relationship (ie \"Primary\").   Today we documented Decision Maker(s) consistent with Legal Next of Kin hierarchy.    Length of Voluntary ACP Conversation in minutes:  <16 minutes (Non-Billable)    DIONICIO Fuentes   for [facility] ([facility])  [employer] Mobile: [phone]

## 2024-05-18 NOTE — PROGRESS NOTES
Pt discharged with all belonging, AVS and educated on medications,s/s of stroke and MI and to call 911 if s/s occur. Pt transported via wheelchair to the front of the hospital where his ride awaited.

## 2024-05-20 LAB — ECHO BSA: 2.14 M2

## 2024-05-23 LAB — INR BLD: 1.1 (ref 0.84–1.16)

## 2025-01-30 ENCOUNTER — APPOINTMENT (OUTPATIENT)
Dept: CT IMAGING | Age: 55
End: 2025-01-30
Payer: MEDICARE

## 2025-01-30 ENCOUNTER — HOSPITAL ENCOUNTER (OUTPATIENT)
Age: 55
Setting detail: OBSERVATION
Discharge: HOME | End: 2025-01-31
Attending: STUDENT IN AN ORGANIZED HEALTH CARE EDUCATION/TRAINING PROGRAM | Admitting: INTERNAL MEDICINE
Payer: MEDICARE

## 2025-01-30 ENCOUNTER — APPOINTMENT (OUTPATIENT)
Dept: MRI IMAGING | Age: 55
End: 2025-01-30
Payer: MEDICARE

## 2025-01-30 ENCOUNTER — APPOINTMENT (OUTPATIENT)
Dept: GENERAL RADIOLOGY | Age: 55
End: 2025-01-30
Payer: MEDICARE

## 2025-01-30 DIAGNOSIS — R29.898 WEAKNESS OF LEFT LOWER EXTREMITY: ICD-10-CM

## 2025-01-30 DIAGNOSIS — R47.1 DYSARTHRIA: ICD-10-CM

## 2025-01-30 DIAGNOSIS — R29.898 WEAKNESS OF LEFT UPPER EXTREMITY: Primary | ICD-10-CM

## 2025-01-30 LAB
ALBUMIN SERPL-MCNC: 4.1 G/DL (ref 3.4–5)
ALBUMIN/GLOB SERPL: 1.4 {RATIO} (ref 1.1–2.2)
ALP SERPL-CCNC: 85 U/L (ref 40–129)
ALT SERPL-CCNC: ABNORMAL U/L (ref 10–40)
ANION GAP SERPL CALCULATED.3IONS-SCNC: 12 MMOL/L (ref 3–16)
AST SERPL-CCNC: 34 U/L (ref 15–37)
BASOPHILS # BLD: 0.1 K/UL (ref 0–0.2)
BASOPHILS NFR BLD: 0.8 %
BILIRUB SERPL-MCNC: 0.6 MG/DL (ref 0–1)
BILIRUB UR QL STRIP.AUTO: NEGATIVE
BUN SERPL-MCNC: 12 MG/DL (ref 7–20)
CALCIUM SERPL-MCNC: 8.7 MG/DL (ref 8.3–10.6)
CHLORIDE SERPL-SCNC: 100 MMOL/L (ref 99–110)
CLARITY UR: CLEAR
CO2 SERPL-SCNC: 23 MMOL/L (ref 21–32)
COLOR UR: YELLOW
CREAT SERPL-MCNC: 1.1 MG/DL (ref 0.9–1.3)
DEPRECATED RDW RBC AUTO: 13.9 % (ref 12.4–15.4)
EKG ATRIAL RATE: 89 BPM
EKG DIAGNOSIS: NORMAL
EKG P AXIS: 32 DEGREES
EKG P-R INTERVAL: 140 MS
EKG Q-T INTERVAL: 370 MS
EKG QRS DURATION: 94 MS
EKG QTC CALCULATION (BAZETT): 450 MS
EKG R AXIS: 14 DEGREES
EKG T AXIS: 21 DEGREES
EKG VENTRICULAR RATE: 89 BPM
EOSINOPHIL # BLD: 0.1 K/UL (ref 0–0.6)
EOSINOPHIL NFR BLD: 1.3 %
FLUAV RNA RESP QL NAA+PROBE: NOT DETECTED
FLUBV RNA RESP QL NAA+PROBE: NOT DETECTED
GFR SERPLBLD CREATININE-BSD FMLA CKD-EPI: 80 ML/MIN/{1.73_M2}
GLUCOSE BLD-MCNC: 129 MG/DL (ref 70–99)
GLUCOSE SERPL-MCNC: 142 MG/DL (ref 70–99)
GLUCOSE UR STRIP.AUTO-MCNC: NEGATIVE MG/DL
HCT VFR BLD AUTO: 40.4 % (ref 40.5–52.5)
HGB BLD-MCNC: 13.8 G/DL (ref 13.5–17.5)
HGB UR QL STRIP.AUTO: NEGATIVE
INR PPP: 0.93 (ref 0.85–1.15)
KETONES UR STRIP.AUTO-MCNC: NEGATIVE MG/DL
LEUKOCYTE ESTERASE UR QL STRIP.AUTO: NEGATIVE
LYMPHOCYTES # BLD: 1.4 K/UL (ref 1–5.1)
LYMPHOCYTES NFR BLD: 18.7 %
MCH RBC QN AUTO: 30.3 PG (ref 26–34)
MCHC RBC AUTO-ENTMCNC: 34.1 G/DL (ref 31–36)
MCV RBC AUTO: 88.9 FL (ref 80–100)
MONOCYTES # BLD: 0.5 K/UL (ref 0–1.3)
MONOCYTES NFR BLD: 6.5 %
NEUTROPHILS # BLD: 5.6 K/UL (ref 1.7–7.7)
NEUTROPHILS NFR BLD: 72.7 %
NITRITE UR QL STRIP.AUTO: NEGATIVE
PERFORMED ON: ABNORMAL
PH UR STRIP.AUTO: 6 [PH] (ref 5–8)
PLATELET # BLD AUTO: 194 K/UL (ref 135–450)
PMV BLD AUTO: 8.6 FL (ref 5–10.5)
POTASSIUM SERPL-SCNC: ABNORMAL MMOL/L (ref 3.5–5.1)
PROT SERPL-MCNC: 7 G/DL (ref 6.4–8.2)
PROT UR STRIP.AUTO-MCNC: NEGATIVE MG/DL
PROTHROMBIN TIME: 12.7 SEC (ref 11.9–14.9)
RBC # BLD AUTO: 4.54 M/UL (ref 4.2–5.9)
RBC #/AREA URNS HPF: NORMAL /HPF (ref 0–4)
SARS-COV-2 RNA RESP QL NAA+PROBE: NOT DETECTED
SODIUM SERPL-SCNC: 135 MMOL/L (ref 136–145)
SP GR UR STRIP.AUTO: 1.01 (ref 1–1.03)
TROPONIN, HIGH SENSITIVITY: <6 NG/L (ref 0–22)
UA DIPSTICK W REFLEX MICRO PNL UR: NORMAL
URN SPEC COLLECT METH UR: NORMAL
UROBILINOGEN UR STRIP-ACNC: 0.2 E.U./DL
WBC # BLD AUTO: 7.7 K/UL (ref 4–11)
WBC #/AREA URNS HPF: NORMAL /HPF (ref 0–5)

## 2025-01-30 PROCEDURE — 80053 COMPREHEN METABOLIC PANEL: CPT

## 2025-01-30 PROCEDURE — 2580000003 HC RX 258

## 2025-01-30 PROCEDURE — 87636 SARSCOV2 & INF A&B AMP PRB: CPT

## 2025-01-30 PROCEDURE — 93005 ELECTROCARDIOGRAM TRACING: CPT

## 2025-01-30 PROCEDURE — 99285 EMERGENCY DEPT VISIT HI MDM: CPT

## 2025-01-30 PROCEDURE — G0378 HOSPITAL OBSERVATION PER HR: HCPCS

## 2025-01-30 PROCEDURE — 85025 COMPLETE CBC W/AUTO DIFF WBC: CPT

## 2025-01-30 PROCEDURE — 6370000000 HC RX 637 (ALT 250 FOR IP): Performed by: INTERNAL MEDICINE

## 2025-01-30 PROCEDURE — 85610 PROTHROMBIN TIME: CPT

## 2025-01-30 PROCEDURE — 71045 X-RAY EXAM CHEST 1 VIEW: CPT

## 2025-01-30 PROCEDURE — 70450 CT HEAD/BRAIN W/O DYE: CPT

## 2025-01-30 PROCEDURE — 84484 ASSAY OF TROPONIN QUANT: CPT

## 2025-01-30 PROCEDURE — 70496 CT ANGIOGRAPHY HEAD: CPT

## 2025-01-30 PROCEDURE — 6360000004 HC RX CONTRAST MEDICATION

## 2025-01-30 PROCEDURE — 70551 MRI BRAIN STEM W/O DYE: CPT

## 2025-01-30 PROCEDURE — 81001 URINALYSIS AUTO W/SCOPE: CPT

## 2025-01-30 RX ORDER — SODIUM CHLORIDE 9 MG/ML
INJECTION, SOLUTION INTRAVENOUS PRN
Status: DISCONTINUED | OUTPATIENT
Start: 2025-01-30 | End: 2025-01-31 | Stop reason: HOSPADM

## 2025-01-30 RX ORDER — IOPAMIDOL 755 MG/ML
75 INJECTION, SOLUTION INTRAVASCULAR
Status: COMPLETED | OUTPATIENT
Start: 2025-01-30 | End: 2025-01-30

## 2025-01-30 RX ORDER — SODIUM CHLORIDE 0.9 % (FLUSH) 0.9 %
5-40 SYRINGE (ML) INJECTION EVERY 12 HOURS SCHEDULED
Status: DISCONTINUED | OUTPATIENT
Start: 2025-01-30 | End: 2025-01-31 | Stop reason: HOSPADM

## 2025-01-30 RX ORDER — ASPIRIN 81 MG/1
81 TABLET, CHEWABLE ORAL 2 TIMES DAILY
Status: DISCONTINUED | OUTPATIENT
Start: 2025-01-30 | End: 2025-01-31 | Stop reason: HOSPADM

## 2025-01-30 RX ORDER — AMLODIPINE BESYLATE 10 MG/1
10 TABLET ORAL DAILY
Status: DISCONTINUED | OUTPATIENT
Start: 2025-01-31 | End: 2025-01-31 | Stop reason: HOSPADM

## 2025-01-30 RX ORDER — PANTOPRAZOLE SODIUM 40 MG/1
40 TABLET, DELAYED RELEASE ORAL
Status: DISCONTINUED | OUTPATIENT
Start: 2025-01-31 | End: 2025-01-31 | Stop reason: HOSPADM

## 2025-01-30 RX ORDER — LISINOPRIL 40 MG/1
40 TABLET ORAL DAILY
Status: DISCONTINUED | OUTPATIENT
Start: 2025-01-31 | End: 2025-01-31 | Stop reason: HOSPADM

## 2025-01-30 RX ORDER — ONDANSETRON 4 MG/1
4 TABLET, ORALLY DISINTEGRATING ORAL EVERY 8 HOURS PRN
Status: DISCONTINUED | OUTPATIENT
Start: 2025-01-30 | End: 2025-01-31 | Stop reason: HOSPADM

## 2025-01-30 RX ORDER — ONDANSETRON 2 MG/ML
4 INJECTION INTRAMUSCULAR; INTRAVENOUS EVERY 6 HOURS PRN
Status: DISCONTINUED | OUTPATIENT
Start: 2025-01-30 | End: 2025-01-31 | Stop reason: HOSPADM

## 2025-01-30 RX ORDER — GABAPENTIN 400 MG/1
400 CAPSULE ORAL 3 TIMES DAILY
Status: DISCONTINUED | OUTPATIENT
Start: 2025-01-30 | End: 2025-01-31 | Stop reason: HOSPADM

## 2025-01-30 RX ORDER — POLYETHYLENE GLYCOL 3350 17 G/17G
17 POWDER, FOR SOLUTION ORAL DAILY PRN
Status: DISCONTINUED | OUTPATIENT
Start: 2025-01-30 | End: 2025-01-31 | Stop reason: HOSPADM

## 2025-01-30 RX ORDER — ENOXAPARIN SODIUM 100 MG/ML
40 INJECTION SUBCUTANEOUS DAILY
Status: DISCONTINUED | OUTPATIENT
Start: 2025-01-31 | End: 2025-01-31 | Stop reason: HOSPADM

## 2025-01-30 RX ORDER — SODIUM CHLORIDE, SODIUM LACTATE, POTASSIUM CHLORIDE, AND CALCIUM CHLORIDE .6; .31; .03; .02 G/100ML; G/100ML; G/100ML; G/100ML
1000 INJECTION, SOLUTION INTRAVENOUS ONCE
Status: COMPLETED | OUTPATIENT
Start: 2025-01-30 | End: 2025-01-30

## 2025-01-30 RX ORDER — ATORVASTATIN CALCIUM 40 MG/1
80 TABLET, FILM COATED ORAL NIGHTLY
Status: DISCONTINUED | OUTPATIENT
Start: 2025-01-30 | End: 2025-01-31 | Stop reason: HOSPADM

## 2025-01-30 RX ORDER — SODIUM CHLORIDE 0.9 % (FLUSH) 0.9 %
5-40 SYRINGE (ML) INJECTION PRN
Status: DISCONTINUED | OUTPATIENT
Start: 2025-01-30 | End: 2025-01-31 | Stop reason: HOSPADM

## 2025-01-30 RX ORDER — DULOXETIN HYDROCHLORIDE 30 MG/1
60 CAPSULE, DELAYED RELEASE ORAL NIGHTLY
Status: DISCONTINUED | OUTPATIENT
Start: 2025-01-30 | End: 2025-01-31 | Stop reason: HOSPADM

## 2025-01-30 RX ORDER — METOPROLOL SUCCINATE 25 MG/1
50 TABLET, EXTENDED RELEASE ORAL NIGHTLY
Status: DISCONTINUED | OUTPATIENT
Start: 2025-01-30 | End: 2025-01-31 | Stop reason: HOSPADM

## 2025-01-30 RX ADMIN — SODIUM CHLORIDE, POTASSIUM CHLORIDE, SODIUM LACTATE AND CALCIUM CHLORIDE 1000 ML: 600; 310; 30; 20 INJECTION, SOLUTION INTRAVENOUS at 18:19

## 2025-01-30 RX ADMIN — DULOXETINE HYDROCHLORIDE 60 MG: 30 CAPSULE, DELAYED RELEASE ORAL at 23:57

## 2025-01-30 RX ADMIN — GABAPENTIN 400 MG: 400 CAPSULE ORAL at 23:57

## 2025-01-30 RX ADMIN — IOPAMIDOL 75 ML: 755 INJECTION, SOLUTION INTRAVENOUS at 15:57

## 2025-01-30 RX ADMIN — ATORVASTATIN CALCIUM 80 MG: 40 TABLET, FILM COATED ORAL at 23:57

## 2025-01-30 RX ADMIN — METOPROLOL SUCCINATE 50 MG: 25 TABLET, EXTENDED RELEASE ORAL at 23:57

## 2025-01-30 RX ADMIN — ASPIRIN 81 MG: 81 TABLET, CHEWABLE ORAL at 23:57

## 2025-01-30 NOTE — ED PROVIDER NOTES
THE [facility]  EMERGENCY DEPARTMENT ENCOUNTER          EM RESIDENT NOTE       Date of evaluation: 1/30/2025    Chief Complaint     Cerebrovascular Accident (LKW 1520. New symptoms Increasing Left sided weakness. Left hand Diminished  strength, Left lower flaccid, Dysarthria)      History of Present Illness     Heriberto Aldana is a 54 y.o. male with a past medical history of CVA and right hip pain with residual left-sided deficits who presents acute onset of dysarthria and worsening of his baseline left-sided deficits.  Reportedly his last known well was around 1520.  He self reported to EMS that he felt the symptoms onset.  He is endorsing difficulty speaking as well as worsening of his left-sided weakness.  He also endorses decree sensation on the left.  No recent sick symptoms including fever, chills, nausea vomiting or diarrhea.  No falls or trauma.  Denies chest pain or shortness of breath.    Notably he has multiple prior encounters after his stroke where he developed left-sided weakness and has had multiple negative MRIs with tPA x 3.  He has a diagnosis of conversion disorder.    Other than stated above, no additional associated symptoms or aggravating/alleviating factors are noted.    MEDICAL DECISION MAKING / ASSESSMENT / PLAN     INITIAL VITALS: BP: (!) 166/108, Temp: 97.7 °F (36.5 °C), Pulse: 94, Respirations: 18, SpO2: 99 %    Nursing Notes, Past Medical Hx, Past Surgical Hx, Social Hx, Allergies, and Family Hx were reviewed.    Upon presentation, the patient was in acute distress, afebrile and hemodynamically stable.  Patient arrives via EMS as a code stroke.  Initial NIH stroke scale revealed left arm weakness left leg weakness, dysarthria as well as some sensory deficits.  NIH stroke scale of 6.  He was taken immediately to the CT scanner where Noncon showed no obvious bleeding on my wet read.  Patient was taken back to his room and it was then noted that his symptoms were improving.  He  Lids/conjunctiva normal.  EARS/NOSE/THROAT: Mucous membranes moist, uvula midline, no oral pharyngeal erythema, exudate or swelling. No lymphangitis/lymphedema.  HEAD/NECK: Normocephalic, atraumatic, neck is supple.  RESPIRATORY: Respiratory effort normal, speaks in full sentences, no tripod position, no accessory muscle use. Lungs clear to auscultation without rhonchi, wheezes, rales.  CARDIAC: Normal rate and regular rhythm, no edema.  ABDOMINAL: Soft, non-distended, and non-tender. No evidence of fluid wave. No pulsatile masses on exam, rebound tenderness, Marte sign or pain over Mcburney's point.  MUSCLES/EXTREMITIES: No abnormal range of motion, no swelling.  SKIN: Warm, pink and dry. No rashes, petechiae or lesions.  Neurologic: Patient presents with left-sided facial droop although I do note that he has no movement of the right side of his mouth when he attempts to smile.  He has extract movements intact.  He names 2 objects appropriately.  He follows few tasks appropriately.  He has pronator drift in the left upper extremity.  He does have drift as well to left lower extremity.  Sensation is decreased in the left lower extremity the right sided extremities are normal.  Unable to ambulate.       Vic Beverly MD  Resident  01/30/25 0228

## 2025-01-30 NOTE — CONSULTS
Called for recs regarding acute neurologic deficits.     LKW about 1520 when patient developed worsening of baseline dysarthria and left face/arm/leg weakness.     On my eval over telemed NIHSS was 5:      NIH Stroke Scale/Score (NIHSS) from [email or web address]  on 1/30/2025    4: Facial palsy --> 1 = Minor paralysis (flat nasolabial fold, smile asymmetry)  5A: Left arm motor drift --> 1 = Drift, but doesn't hit bed  6A: Left leg motor drift --> 1 = Drift, but doesn't hit bed  8: Sensation --> 1 = Mild-moderate loss: less sharp/more dull   10: Dysarthria --> 1 = Mild-moderate dysarthria: slurring but can be understood\    Patient was able to walk the hallway in the ED using his cane, essentially at his baseline level of functional mobility. He is right-hand-dominant, does not rely on fine dexterity for profession or hobbies.     Reviewed imaging: CT head no acute findings, ASPECTS 10. CTA no intracranial large vessel occlusion.    Discussed risks, benefits, and alternatives to TNK with pt including roughly one third chance each of improvement, no improvement, and progression; ~ 6% chance of symptomatic ICH, 3% chance of fatal ICH; and no acute treatment followed by secondary prevention and rehabilitation.     Pt's questions were answered and he opted to refuse treatment with TNK.    Recs:  - Autoregulate BP < 220/110  - Continue ASA, statin  - Neurology consult, secondary preventative measures     Rhett Tellez MD   stroke team     Telemedicine time 45 minutes

## 2025-01-30 NOTE — H&P
V2.0  History and Physical      Name:  Heriberto Aldana /Age/Sex: 1970  (54 y.o. male)   MRN & CSN:  [id number] & [id number] Encounter Date/Time: 2025 6:16 PM EST   [facility] PCP: Colleen Prabhakar MD       Hospital Day: 1    Assessment and Plan:   Heriberto Aldana is a 54 y.o. male with a pmh of prior ischemic stroke with residual left-sided deficits (in ), CAD, hemiplegic migraine, conversion disorder, essential hypertension, hyperlipidemia who presents with worsening Left-sided weakness    Hospital Problems             Last Modified POA    * (Principal) Left-sided weakness 2025 Yes   #Suspected acute CVA versus recrudescence of prior CVA  -Last known normal 1520 today  -Presenting with dysarthria and worsening left-sided weakness  -CT head with no acute intracranial abnormalities  -CTA head and neck with no LVO's  -NIHSS = 2 (for left upper and lower extremity drift)  -Patient was not given TNK due to his improving symptoms (dysarthria has resolved, left-sided weakness has improved but  has not improved to his baseline)  -Will maintain systolic blood pressure around 140/160 and diastolic blood pressure around 90/100 until MRI brain is completed  -Neurochecks every 4 hourly  -Neurology consulted  -PT/OT/SLP  -Continue on aspirin and high intensity statins  -Check lipid panel and A1c    #Essential hypertension  -Maintain blood pressure as above recommendations  -Continue home doses of amlodipine, lisinopril and Toprol XL with holding parameters    #Chronic medical conditions as mentioned in PMH  -Continue home medications appropriately      Disposition:   Current Living situation: Home  Expected Disposition: Home  Estimated D/C: 2 days    Diet ADULT DIET; Regular; Low Fat/Low Chol/High Fiber/2 gm Na   DVT Prophylaxis [x] Lovenox, []  Heparin, [] SCDs, [] Ambulation,  [] Eliquis, [] Xarelto, [] Coumadin   Code Status Full   Surrogate Decision Maker/ POA Spouse     Personally  Barry Puente MD   metFORMIN (GLUCOPHAGE) 500 MG tablet Take 1 tablet by mouth at bedtime    ProviderJuan MD   aspirin (ASPIRIN CHILDRENS) 81 MG chewable tablet Take 1 tablet by mouth in the morning and at bedtime To prevent blood clots 10/4/23 11/3/23  Misael Gallo MD   senna-docusate (PERICOLACE) 8.6-50 MG per tablet Take 2 tablets by mouth daily as needed for Constipation 10/4/23   Misael Gallo MD   nitroGLYCERIN (NITROSTAT) 0.4 MG SL tablet up to max of 3 total doses. If no relief after 1 dose, call 911. 12/3/21   Carmelina Watts APRN - CNP   lisinopril (PRINIVIL;ZESTRIL) 40 MG tablet Take 1 tablet by mouth daily 11/23/21   Carmelina Watts APRN - CNP   amLODIPine (NORVASC) 5 MG tablet Take 2 tablets by mouth daily 11/23/21   Carmelina Watts APRN - CNP   atorvastatin (LIPITOR) 80 MG tablet Take 1 tablet by mouth nightly 11/3/21   Carmelina Watts APRN - CNP   DULoxetine (CYMBALTA) 60 MG extended release capsule Take 1 capsule by mouth at bedtime    Jaun Fuller MD   gabapentin (NEURONTIN) 400 MG capsule Take 1 capsule by mouth 3 times daily.    Juan Fuller MD   omeprazole (PRILOSEC) 20 MG delayed release capsule Take 2 capsules by mouth daily    ProviderJuan MD       Physical Exam:        General: NAD, afebrile, not on supplemental oxygen  Eyes: EOMI  ENT: neck supple  Cardiovascular: Regular rhythm and rate.  Respiratory: Clear to auscultation  Gastrointestinal: Soft, non tender  Genitourinary: no suprapubic tenderness  Musculoskeletal: No edema  Skin: warm, dry  Neuro: Alert.  Awake.  Oriented x 4.  Left arm and left leg pronator drift.  NIHSS = 2 handgrip weaker on the left.  No facial droop.  CN II-XII intact.  Follows commands appropriately.  Speech clear  Psych: Mood appropriate.       Past Medical History:   PMHx   Past Medical History:   Diagnosis Date    Ambulates with cane     Arthritis     CAD (coronary artery disease)     Diabetes

## 2025-01-30 NOTE — ED NOTES
Lab notified via phone of code stroke blood being sent securely in yellow lab bag.     Roge Hong, RN  01/30/25 [id number]

## 2025-01-31 VITALS
SYSTOLIC BLOOD PRESSURE: 123 MMHG | BODY MASS INDEX: 32.41 KG/M2 | TEMPERATURE: 97.8 F | WEIGHT: 213.19 LBS | OXYGEN SATURATION: 96 % | DIASTOLIC BLOOD PRESSURE: 85 MMHG | RESPIRATION RATE: 18 BRPM | HEART RATE: 74 BPM

## 2025-01-31 PROBLEM — I63.9 ISCHEMIC STROKE (HCC): Status: ACTIVE | Noted: 2025-01-31

## 2025-01-31 LAB
ALT SERPL-CCNC: 19 U/L (ref 10–40)
CHOLEST SERPL-MCNC: 124 MG/DL (ref 0–199)
EST. AVERAGE GLUCOSE BLD GHB EST-MCNC: 119.8 MG/DL
HBA1C MFR BLD: 5.8 %
HDLC SERPL-MCNC: 35 MG/DL (ref 40–60)
LDLC SERPL CALC-MCNC: 45 MG/DL
MAGNESIUM SERPL-MCNC: 2.08 MG/DL (ref 1.8–2.4)
POTASSIUM SERPL-SCNC: 4.2 MMOL/L (ref 3.5–5.1)
TRIGL SERPL-MCNC: 221 MG/DL (ref 0–150)
VLDLC SERPL CALC-MCNC: 44 MG/DL

## 2025-01-31 PROCEDURE — 84132 ASSAY OF SERUM POTASSIUM: CPT

## 2025-01-31 PROCEDURE — 97162 PT EVAL MOD COMPLEX 30 MIN: CPT

## 2025-01-31 PROCEDURE — 83036 HEMOGLOBIN GLYCOSYLATED A1C: CPT

## 2025-01-31 PROCEDURE — 6370000000 HC RX 637 (ALT 250 FOR IP): Performed by: INTERNAL MEDICINE

## 2025-01-31 PROCEDURE — 97535 SELF CARE MNGMENT TRAINING: CPT

## 2025-01-31 PROCEDURE — 2500000003 HC RX 250 WO HCPCS: Performed by: INTERNAL MEDICINE

## 2025-01-31 PROCEDURE — 97165 OT EVAL LOW COMPLEX 30 MIN: CPT

## 2025-01-31 PROCEDURE — 6360000002 HC RX W HCPCS: Performed by: INTERNAL MEDICINE

## 2025-01-31 PROCEDURE — G0378 HOSPITAL OBSERVATION PER HR: HCPCS

## 2025-01-31 PROCEDURE — 83735 ASSAY OF MAGNESIUM: CPT

## 2025-01-31 PROCEDURE — 84460 ALANINE AMINO (ALT) (SGPT): CPT

## 2025-01-31 PROCEDURE — 36415 COLL VENOUS BLD VENIPUNCTURE: CPT

## 2025-01-31 PROCEDURE — 80061 LIPID PANEL: CPT

## 2025-01-31 PROCEDURE — 96372 THER/PROPH/DIAG INJ SC/IM: CPT

## 2025-01-31 PROCEDURE — 97116 GAIT TRAINING THERAPY: CPT

## 2025-01-31 RX ORDER — M-VIT,TX,IRON,MINS/CALC/FOLIC 27MG-0.4MG
1 TABLET ORAL DAILY
COMMUNITY

## 2025-01-31 RX ORDER — AMLODIPINE BESYLATE 10 MG/1
10 TABLET ORAL DAILY
COMMUNITY

## 2025-01-31 RX ORDER — LISINOPRIL 40 MG/1
40 TABLET ORAL DAILY
COMMUNITY

## 2025-01-31 RX ORDER — ASPIRIN 81 MG/1
81 TABLET ORAL DAILY
COMMUNITY

## 2025-01-31 RX ORDER — OMEPRAZOLE 40 MG/1
40 CAPSULE, DELAYED RELEASE ORAL DAILY
COMMUNITY

## 2025-01-31 RX ADMIN — SODIUM CHLORIDE, PRESERVATIVE FREE 10 ML: 5 INJECTION INTRAVENOUS at 09:57

## 2025-01-31 RX ADMIN — GABAPENTIN 400 MG: 400 CAPSULE ORAL at 09:56

## 2025-01-31 RX ADMIN — ASPIRIN 81 MG: 81 TABLET, CHEWABLE ORAL at 09:57

## 2025-01-31 RX ADMIN — PANTOPRAZOLE SODIUM 40 MG: 40 TABLET, DELAYED RELEASE ORAL at 06:34

## 2025-01-31 RX ADMIN — ENOXAPARIN SODIUM 40 MG: 100 INJECTION SUBCUTANEOUS at 09:57

## 2025-01-31 RX ADMIN — LISINOPRIL 40 MG: 40 TABLET ORAL at 09:56

## 2025-01-31 RX ADMIN — AMLODIPINE BESYLATE 10 MG: 10 TABLET ORAL at 09:56

## 2025-01-31 NOTE — NURSE NAVIGATOR
Patient's chart reviewed for Stroke Core Measures and additional needs:    [x]   VTE prophylaxis - Lovenox ordered / administered, see eMAR    [x]   Antithrombotic (if applicable) - Aspirin ordered / administered, see eMAR    [x]   Swallow screen prior to PO intake - Completed    [x]   Lipids / A1C ordered or resulted - Ordered, awaiting collect. High intensity statin ordered / administered, see eMAR     [x]   Therapy ordered - PT/OT evals completed today 1/31   [x]   Care plan and Education template - Added     Patient's personal risk factors specific to stroke/TIA include: CVA; HTN; HLD; CAD; DM; CHRISTOPHER; obesity (BMI 33)     Navigator to continue to follow patient while admitted, to assist with follow up and discharge planning as needed.     Nurse eSignature: Electronically signed by Nataliia Clark RN on 1/31/25 at 11:50 AM EST - Neuroscience Navigator

## 2025-01-31 NOTE — DISCHARGE SUMMARY
V2.0  Discharge Summary    Name:  Heriberto Aldana /Age/Sex: 1970 (54 y.o. male)   Admit Date: 2025  Discharge Date: 25    MRN & CSN:  [id number] & [id number] Encounter Date and Time 25 5:36 PM EST    Attending:  No att. providers found Discharging Provider: Janusz Mace MD       Hospital Course:     Brief HPI: 54 y.o. male with a pmh of prior ischemic stroke with residual left-sided deficits (in ), CAD, hemiplegic migraine, conversion disorder, essential hypertension, hyperlipidemia who presents with worsening Left-sided weakness     Brief Problem Based Course:   #Suspected acute CVA versus recrudescence of prior CVA  -Last known normal 1520 on the day of presentation  -Presenting with dysarthria and worsening left-sided weakness  -CT head with no acute intracranial abnormalities  -CTA head and neck with no LVO's  -NIHSS = 2 (for left upper and lower extremity drift)  -Patient was not given TNK due to his improving symptoms (dysarthria has resolved, left-sided weakness has improved but  has not improved to his baseline)  -Will maintain systolic blood pressure around 140/160 and diastolic blood pressure around 90/100 until MRI brain is completed  -Neurochecks every 4 hourly  -Continue on aspirin and high intensity statins  -Lipid panel shows a cholesterol of 124, HDL 35, LDL 45, triglyceride 221, A1c pending  MRI brain negative for acute stroke  Neurology Consult canceled  Patient's symptoms resolved, functionality returned to baseline  Patient requesting discharge home     #Essential hypertension  -Maintain blood pressure as above recommendations  -Continue home doses of amlodipine, lisinopril and Toprol XL with holding parameters     #Chronic medical conditions   -CHRISTOPHER, noncompliant with CPAP, advised to be compliant  -Continue home medications appropriately    CODE STATUS, full code  POA is patient's spouse Hope    The patient expressed appropriate understanding of, and agreement

## 2025-01-31 NOTE — ED PROVIDER NOTES
ED Attending Attestation Note     Date of evaluation: 1/30/2025    This patient was seen by the resident.  I have seen and examined the patient, agree with the workup, evaluation, management and diagnosis. The care plan has been discussed.  I have reviewed the ECG and concur with the resident's interpretation.  My assessment reveals a nontoxic appearing 54 year old male presenting with worsening LUE/LLE weakness and dysarthria that began 10 minutes prior to arrival. He reports history of stroke with L hemiplegia but notes symptoms are worse than normal, and dysarthria is new. Patient ambulates with a cane at baseline due to this weakness. Exam notable for 4/5 strength in LUE and LLE with some drift. Atypical left facial droop present on initial evaluation that has resolved. No apparent dysarthria on reassessment. NIHSS 6 on arrival, decreased to 2 after CT imaging. Able to ambulate in the ED with his cane. CT/CTA without bleed or LVO. Discussed with stroke team and together feel that he is not a candidate for TNK given improving and altogether nondisabling deficits. We had a risk benefit discussion with the patient, including Dr. Tellez of stroke team, and patient agrees and declines thrombolysis. To that end, patient has received TPA 3 times prior for similar presentations all with negative subsequent MRIs (possibly hemiplegic migraine vs. Conversion disorder per previous encounters?). Given report of ongoing weakness, will admit for neurologic consultation and MRI.    Critical Care:  Due to the immediate potential for life-threatening deterioration due to code stroke, I spent 45 minutes providing critical care.  This time excludes time spent performing procedures but includes time spent on direct patient care, history retrieval, review of the chart, and discussions with patient, family, and consultant(s).     Anthony Cody MD  01/31/25 [id number]

## 2025-01-31 NOTE — PROGRESS NOTES
Clinical Pharmacy Note  Medication History     Admit Date: 1/30/2025       List of current medications patient is taking is complete. Home Medication list in EPIC updated to reflect changes noted below.    Source of information:     Review of Rx dispense history (Surescripts-complete dispense report in Epic)    Discussion with patient      Patient's home pharmacy: [facility] [address] [place] [address], [place] - [address] - P [phone] - F [phone]      CHANGES TO HOME MEDICATION LIST:    REMOVED:     None    ADDED:  MVI daily  B-12 1000 mcg daily  D3 2000 units daily    DOSE or FREQUENCY CHANGE:  none      Current Outpatient Medications   Medication Instructions    amLODIPine (NORVASC) 10 mg, Oral, DAILY    aspirin 81 mg, Oral, DAILY    atorvastatin (LIPITOR) 80 mg, Oral, NIGHTLY    cyanocobalamin 1,000 mcg, Oral, DAILY    DULoxetine (CYMBALTA) 60 mg, Oral, Nightly    gabapentin (NEURONTIN) 400 mg, Oral, 3 TIMES DAILY    lisinopril (PRINIVIL;ZESTRIL) 40 mg, Oral, DAILY    metFORMIN (GLUCOPHAGE) 500 mg, Oral, Nightly    metoprolol succinate (TOPROL XL) 50 mg, Oral, Nightly    Multiple Vitamins-Minerals (THERAPEUTIC MULTIVITAMIN-MINERALS) tablet 1 tablet, Oral, DAILY    nitroGLYCERIN (NITROSTAT) 0.4 MG SL tablet up to max of 3 total doses. If no relief after 1 dose, call 911.    omeprazole (PRILOSEC) 40 mg, Oral, DAILY    senna-docusate (PERICOLACE) 8.6-50 MG per tablet 2 tablets, Oral, DAILY PRN    vitamin D (CHOLECALCIFEROL) 2,000 Units, Oral, DAILY         Gayle Sifuentes [facility] PharmD, [facility]   Inpatient pharmacy [phone]

## 2025-01-31 NOTE — PROGRESS NOTES
Pt dc home with family. Pt educated with AVS and verbalized understanding. Pt iv removed and vitals stable at time of dc. Pt has all belongings. Douglas White RN

## 2025-01-31 NOTE — CARE COORDINATION
Case Management           Date/ Time of Note: 1/31/2025 2:30 PM  Note completed by: PHIL Escamilla, EVERARDO    If patient is discharged prior to next notation, then this note serves as note for discharge by case management.    Patient Name: Heriberto Aldana  YOB: 1970    Diagnosis:Left-sided weakness [R53.1]  Patient Admission Status: Observation  Date of Admission:1/30/2025  3:46 PM    Length of Stay: 0 GLOS:   Readmission Risk Score:      Current Plan of Care:   PATH-7 not completed, stroke ruled out.     SW met w/pt at bedside. Pt is from home with his wife and minor children. Pt is indp at baseline. Pt has no current services at home. Pt stated he will dc home and denies needs for dc. Pt states he will be fine at home and his family will transport him home.     Referrals completed: Not Applicable    Resources/ information provided: Not indicated at this time    IMM Status:        Heriberto and his family were provided with choice of provider; he and his family are in agreement with the discharge plan.    Electronically signed by PHIL Escamilla, EVERARDO, MAIKOL on 1/31/2025 at 10:18 AM  [facility]  Case Management Department  Ph:  [phone]

## 2025-01-31 NOTE — PROGRESS NOTES
Physical Therapy  Facility/Department: THE [facility] EMERGENCY DEPARTMENT  Physical Therapy Initial Assessment and DISCHARGE    Name: Heriberto Aldana  : 1970  MRN: [id number]  Date of Service: 2025    Discharge Recommendations:  Home with assist PRN, No therapy recommended at discharge   PT Equipment Recommendations  Equipment Needed: No    Assessment  Assessment: Pt from home, presenting to ED with new L sided weakness.  Pt reports resolution of symptoms.  No neuro deficits on PT exam.  Pt demonstrates independence with transfers and steady gait using single point cane.  No acute PT needs identified at this time.  Pt has support from wife and family at home.  Will sign off from acute PT.  Decision Making: Medium Complexity  Requires PT Follow-Up: No    Plan  General Plan: Discharge with evaluation only    Safety Devices  Type of Devices: Left in chair, Call light within reach, Nurse notified (RN ok with no chair alarm in place)    Restrictions  Other Position/Activity Restrictions: Up as tolerated     Subjective  Chart Reviewed: Yes  Additional Pertinent Hx: Pt to ED  with L sided weakness.  CT/CTA head (-).  Stroke team consulted for suspected acute CVA versus recrudescence of prior CVA.  Pt refused TNK treatment.  PMH:  HTN, stroke, CAD, DVT, CHRISTOPHER, OA, DM, conversion disorder    Diagnosis: L sided weakness    Subjective  Subjective: Pt found supine in bed.  Pt reports resolution of symptoms.  \"I'm at my own normal.\"  Pt denies pain.    Social/Functional History  Lives With: Spouse (3 teenagers)  Type of Home: House  Home Layout: Two level, Able to Live on Main level with bedroom/bathroom, Laundry in basement  Home Access: Stairs to enter with rails (4 steps in with railing)  Bathroom Shower/Tub: Walk-in shower  Bathroom Toilet: Handicap height (sink next to commode)  Bathroom Equipment: Shower chair, Hand-held shower  Home Equipment: Cane, Walker - Rolling  Has the patient had two or more  falls in the past year or any fall with injury in the past year?: No  Prior Level of Assist for ADLs: Independent  Prior Level of Assist for Homemaking:  (pt does laundry, wife cooks, kid A pt with cleaning)  Prior Level of Assist for Ambulation: Independent household ambulator, with or without device  Prior Level of Assist for Transfers: Independent  Active : Yes  Occupation: On disability  Leisure & Hobbies: woodworking  Additional Comments: wife works full time    Vision/Hearing  Vision: Within Functional Limits (wears glasses)  Hearing: Within functional limits      Cognition   Orientation  Overall Orientation Status: Within Functional Limits    Cognition  Overall Cognitive Status: WFL    Objective  Gross Assessment  AROM: Within functional limits  Strength: Within functional limits    Bed mobility  Supine to Sit: Independent    Transfers  Sit to Stand: Independent  Stand to Sit: Independent    Ambulation  Device: Single point cane  Assistance: Independent  Gait Deviations: None  Distance: 200ft    Balance  Sitting - Static: Good  Sitting - Dynamic: Good  Standing - Static: Good  Standing - Dynamic: Good (using cane)    AM-PAC - Mobility  AM-PAC Inpatient Mobility Raw Score : 24  AM-PAC Inpatient T-Scale Score : 61.14  Mobility Inpatient CMS 0-100% Score: 0  Mobility Inpatient CMS G-Code Modifier : CH      Education  Patient Education  Education Given To: Patient  Education Provided: Role of Therapy  Education Method: Verbal  Education Outcome: Verbalized understanding;Demonstrated understanding      Therapy Time   Individual Concurrent Group Co-treatment   Time In 0910         Time Out 0935         Minutes 25         Timed Code Treatment Minutes:  15  Total Treatment Minutes:  25      Stephanie Washburn, PT

## 2025-01-31 NOTE — PROGRESS NOTES
Occupational Therapy  Facility/Department: THE [facility] EMERGENCY DEPARTMENT  Occupational Therapy Initial Assessment/tx/discharge    Name: Heriberto Aldana  : 1970  MRN: [id number]  Date of Service: 2025    Discharge Recommendations:  Home with assist PRN          Patient Diagnosis(es): The primary encounter diagnosis was Weakness of left upper extremity. Diagnoses of Weakness of left lower extremity and Dysarthria were also pertinent to this visit.  Past Medical History:  has a past medical history of Ambulates with cane, Arthritis, CAD (coronary artery disease), Diabetes mellitus (HCC), Hx of blood clots, Hyperlipidemia, Hypertension, Long term current use of oral hypoglycemic drug, CHRISTOPHER (obstructive sleep apnea), Stroke (HCC), Wears glasses, and Wears glasses.  Past Surgical History:  has a past surgical history that includes knee surgery; hernia repair; Elbow surgery (Left); Knee arthroscopy (Left, 2022); shoulder surgery (Left); Colonoscopy; Total knee arthroplasty (Left, 10/4/2023); and Cardiac procedure (N/A, 2024).           Assessment  Assessment: Pt admitted with increased L side weakness/decreased sensation, dysarthria, dx of Suspected acute CVA versus recrudescence of prior CVA,  He is functioning at baseline level, his dysarthria has resolved and he is indep with ADLs and mobility Prior to admission, pt resided with wife and 3 children.  He was indep with ADLs, mobility, and some home mgt.  No acute OT needs, d/c OT.  Recommend discharge to home with PRN A.  Decision Making: Low Complexity  REQUIRES OT FOLLOW-UP: No  Activity Tolerance  Activity Tolerance: Patient Tolerated treatment well     Plan  Occupational Therapy Plan  Additional Comments: d/c pt from OT    Restrictions  Position Activity Restriction  Other Position/Activity Restrictions: Up as tolerated    Subjective  General  Chart Reviewed: Yes  Additional Pertinent Hx: PMH:  CVA, HTN, CAD, DM, blood clots, hernia  limits  Strength:  (4-/ 5 L UE)  Coordination: Within functional limits (slower movements in L UE from previous stroke)  ADL  LE Dressing: Independent (donning blue jeans)  Putting On/Taking Off Footwear: Independent (donning slip on gym shoes)        Bed mobility  Supine to Sit: Independent  Scooting: Independent  Transfers  Sit to stand: Independent  Stand to sit: Independent  Vision  Vision: Within Functional Limits (wears glasses)  Hearing  Hearing: Within functional limits  Cognition  Overall Cognitive Status: WFL  Orientation  Overall Orientation Status: Within Functional Limits                  Education Given To: Patient  Education Provided: Role of Therapy;Plan of Care  Education Method: Demonstration;Verbal  Barriers to Learning: None  Education Outcome: Verbalized understanding;Demonstrated understanding                     G-Code     OutComes Score                                                  AM-PAC - ADL  AM-PAC Daily Activity - Inpatient   How much help is needed for putting on and taking off regular lower body clothing?: None  How much help is needed for bathing (which includes washing, rinsing, drying)?: None  How much help is needed for toileting (which includes using toilet, bedpan, or urinal)?: None  How much help is needed for putting on and taking off regular upper body clothing?: None  How much help is needed for taking care of personal grooming?: None  How much help for eating meals?: None  AM-PAC Inpatient Daily Activity Raw Score: 24  AM-PAC Inpatient ADL T-Scale Score : 57.54  ADL Inpatient CMS 0-100% Score: 0  ADL Inpatient CMS G-Code Modifier : CH                    Therapy Time   Individual Concurrent Group Co-treatment   Time In 0910         Time Out 0935         Minutes 25             Timed Code Treatment Minutes:   10    Total Treatment Minutes:   25    Yaritza Cornell, OTR/L [id number]

## 2025-01-31 NOTE — DISCHARGE INSTRUCTIONS
[facility] Stroke Program Survey  The [facility] [place] values your feedback related to your recent hospital visit and admission. We strive to improve our Neuroscience program to promote better outcomes and recoveries for all our patients.  The anonymous survey below consists of a few questions that are related to your stay and around your Stroke diagnosis, treatment, and recovery. It is anonymous and has only a few questions.  The estimated length of time needed to complete this survey is 3 minutes or less. Thank you for completing this survey!

## (undated) DEVICE — CATHETER 5FR JR4 MEDTRONIC 100CM

## (undated) DEVICE — CATHETER DIAG 5FR L100CM LUMN ID0.047IN JL3.5 CRV 0 SIDE H

## (undated) DEVICE — TUBING FLD MGMT Y DBL SPIK DUALWAVE

## (undated) DEVICE — KIT INT FIX FEM TIB CKPT MAKOPLASTY

## (undated) DEVICE — KNEE ARTHROSCOPY: Brand: MEDLINE INDUSTRIES, INC.

## (undated) DEVICE — PAD DRY FLOOR ABS 32X58IN GRN

## (undated) DEVICE — 3M™ COBAN™ NL STERILE NON-LATEX SELF-ADHERENT WRAP, 2086S, 6 IN X 5 YD (15 CM X 4,5 M), 12 ROLLS/CASE: Brand: 3M™ COBAN™

## (undated) DEVICE — BLADE,CARBON-STEEL,15,STRL,DISPOSABLE,TB: Brand: MEDLINE

## (undated) DEVICE — UNDERGLOVE SURG SZ 8 BLU LTX FREE SYN POLYISOPRENE POLYMER

## (undated) DEVICE — SOLUTION IV 1000ML 0.9% SOD CHL

## (undated) DEVICE — ADHESIVE SKIN CLOSURE WND 8.661X1.5 IN 22 CM LIQUIBAND SECUR

## (undated) DEVICE — CATH LAB PACK: Brand: MEDLINE INDUSTRIES, INC.

## (undated) DEVICE — OPTIFOAM GENTLE SA, POSTOP, 4X12: Brand: MEDLINE

## (undated) DEVICE — BLADE SHV L13CM DIA4MM EXCALIBUR AGG COOLCUT

## (undated) DEVICE — BOWL AND CEMENT CARTRIDGE WITH BREAKAWAY FEMORAL NOZZLE AND MEDIUM PRESSURIZER: Brand: ACM

## (undated) DEVICE — GLIDESHEATH SLENDER STAINLESS STEEL KIT: Brand: GLIDESHEATH SLENDER

## (undated) DEVICE — TUBING PMP L16FT MAIN DISP FOR AR-6400 AR-6475

## (undated) DEVICE — SPONGE,LAP,18"X18",DLX,XR,ST,5/PK,40/PK: Brand: MEDLINE

## (undated) DEVICE — ELECTRODE PT RET AD L9FT HI MOIST COND ADH HYDRGEL CORDED

## (undated) DEVICE — COVER,MAYO STAND,XL,STERILE: Brand: MEDLINE

## (undated) DEVICE — KIT DRP FOR RIO ROBOTIC ARM ASST SYS

## (undated) DEVICE — SOLUTION IRRIG 1000ML STRL H2O USP PLAS POUR BTL

## (undated) DEVICE — PIN BNE FIX L110MM DIA32MM

## (undated) DEVICE — CUFF RESTRN WRST OR ANK 45FT AD FOAM

## (undated) DEVICE — COUNTER NDL 40 COUNT HLD 70 NUM FOAM BLK SGL MAG W BLDE REMV

## (undated) DEVICE — 60 ML SYRINGE,CATHETER TIP: Brand: MONOJECT

## (undated) DEVICE — TOTAL KNEE: Brand: MEDLINE INDUSTRIES, INC.

## (undated) DEVICE — SUTURE MCRYL SZ 4-0 L27IN ABSRB UD L19MM PS-2 1/2 CIR PRIM Y426H

## (undated) DEVICE — TOWEL,STOP FLAG GOLD N-W: Brand: MEDLINE

## (undated) DEVICE — SYRINGE IRRIG 60ML SFT PLIABLE BLB EZ TO GRP 1 HND USE W/

## (undated) DEVICE — SUTURE STRATAFIX SPRL SZ 1 L14IN ABSRB VLT L48CM CTX 1/2 SXPD2B405

## (undated) DEVICE — GOWN,SIRUS,POLYRNF,BRTHSLV,XL,30/CS: Brand: MEDLINE

## (undated) DEVICE — 260 CM J TIP WIRE .035

## (undated) DEVICE — BLADE SHV L13CM DIA4MM TAPR TIP SCIS LIKE CUT OVL OUTER

## (undated) DEVICE — PAD,NON-ADHERENT,3X8,STERILE,LF,1/PK: Brand: MEDLINE

## (undated) DEVICE — SUTURE STRATAFIX SPRL SZ 3-0 L12IN ABSRB UD FS-1 L30X30CM SXMP2B410

## (undated) DEVICE — PIN BNE FIX TEMP L140MM DIA4MM MAKO

## (undated) DEVICE — TUBING PMP L6FT CONT WAVE EXTN

## (undated) DEVICE — APPLICATOR MEDICATED 26 CC SOLUTION HI LT ORNG CHLORAPREP

## (undated) DEVICE — SOLUTION IRRIG 3000ML LAC R FLX CONT

## (undated) DEVICE — KIT TRK KNEE PROC VIZADISC

## (undated) DEVICE — PAD, DEFIB, ADULT, RADIOTRANS, PHYSIO: Brand: MEDLINE

## (undated) DEVICE — GLOVE ORTHO 7 1/2   MSG9475

## (undated) DEVICE — SUTURE PDS + SZ 0 L27IN ABSRB VLT L36MM CT 1 1 2 CIR PDP340H

## (undated) DEVICE — PROBE ABLAT XL 90DEG ASPIR BPLR RF 1 PC ELECTRD ERGO HNDL

## (undated) DEVICE — ADHESIVE SKIN CLSR 0.7ML TOP DERMBND ADV

## (undated) DEVICE — SUTURE STRATAFIX SYMMETRIC PDS + SZ 2-0 L18IN ABSRB VLT SXPP1A403

## (undated) DEVICE — BOOT POS LEG DEMAYO

## (undated) DEVICE — CORD RETRCT SIL

## (undated) DEVICE — PAD,ABDOMINAL,5"X9",ST,LF,25/BX: Brand: MEDLINE INDUSTRIES, INC.

## (undated) DEVICE — PENCIL ELECSURG HND CTRL ALL IN 1 MONOPOLAR LAP

## (undated) DEVICE — TOWEL,OR,DSP,ST,BLUE,DLX,8/PK,10PK/CS: Brand: MEDLINE